# Patient Record
Sex: FEMALE | Race: WHITE | Employment: FULL TIME | URBAN - NONMETROPOLITAN AREA
[De-identification: names, ages, dates, MRNs, and addresses within clinical notes are randomized per-mention and may not be internally consistent; named-entity substitution may affect disease eponyms.]

---

## 2017-05-12 ENCOUNTER — HOSPITAL ENCOUNTER (OUTPATIENT)
Dept: GENERAL RADIOLOGY | Age: 27
Discharge: HOME | End: 2017-05-12
Payer: MEDICARE

## 2017-05-12 ENCOUNTER — HOSPITAL ENCOUNTER (OUTPATIENT)
Dept: ULTRASOUND IMAGING | Age: 27
Discharge: HOME | End: 2017-05-12
Payer: MEDICARE

## 2017-05-12 ENCOUNTER — HOSPITAL ENCOUNTER (OUTPATIENT)
Dept: WOMENS IMAGING | Age: 27
Discharge: HOME | End: 2017-05-12
Payer: MEDICARE

## 2017-05-12 DIAGNOSIS — R52 PAIN: ICD-10-CM

## 2017-05-12 DIAGNOSIS — N63.0 LUMP OR MASS IN BREAST: ICD-10-CM

## 2017-05-12 PROCEDURE — G0204 DX MAMMO INCL CAD BI: HCPCS

## 2017-05-12 PROCEDURE — 76641 ULTRASOUND BREAST COMPLETE: CPT

## 2017-07-14 ENCOUNTER — HOSPITAL ENCOUNTER (EMERGENCY)
Age: 27
Discharge: HOME | End: 2017-07-14
Attending: EMERGENCY MEDICINE
Payer: MEDICARE

## 2017-07-14 VITALS
DIASTOLIC BLOOD PRESSURE: 68 MMHG | RESPIRATION RATE: 16 BRPM | SYSTOLIC BLOOD PRESSURE: 108 MMHG | OXYGEN SATURATION: 95 % | TEMPERATURE: 95.8 F | HEART RATE: 64 BPM

## 2017-07-14 DIAGNOSIS — G43.009 NONINTRACTABLE MIGRAINE, UNSPECIFIED MIGRAINE TYPE: Primary | ICD-10-CM

## 2017-07-14 PROCEDURE — 6360000002 HC RX W HCPCS: Performed by: EMERGENCY MEDICINE

## 2017-07-14 PROCEDURE — 96374 THER/PROPH/DIAG INJ IV PUSH: CPT

## 2017-07-14 PROCEDURE — 99283 EMERGENCY DEPT VISIT LOW MDM: CPT

## 2017-07-14 PROCEDURE — 2580000003 HC RX 258: Performed by: EMERGENCY MEDICINE

## 2017-07-14 PROCEDURE — 96361 HYDRATE IV INFUSION ADD-ON: CPT

## 2017-07-14 PROCEDURE — 96375 TX/PRO/DX INJ NEW DRUG ADDON: CPT

## 2017-07-14 RX ORDER — KETOROLAC TROMETHAMINE 15 MG/ML
15 INJECTION, SOLUTION INTRAMUSCULAR; INTRAVENOUS ONCE
Status: COMPLETED | OUTPATIENT
Start: 2017-07-14 | End: 2017-07-14

## 2017-07-14 RX ORDER — ACETAMINOPHEN 325 MG/1
650 TABLET ORAL EVERY 6 HOURS PRN
COMMUNITY
End: 2018-06-11 | Stop reason: ALTCHOICE

## 2017-07-14 RX ORDER — ONDANSETRON 2 MG/ML
8 INJECTION INTRAMUSCULAR; INTRAVENOUS ONCE
Status: COMPLETED | OUTPATIENT
Start: 2017-07-14 | End: 2017-07-14

## 2017-07-14 RX ORDER — METOCLOPRAMIDE HYDROCHLORIDE 5 MG/ML
10 INJECTION INTRAMUSCULAR; INTRAVENOUS ONCE
Status: COMPLETED | OUTPATIENT
Start: 2017-07-14 | End: 2017-07-14

## 2017-07-14 RX ORDER — DIPHENHYDRAMINE HYDROCHLORIDE 50 MG/ML
25 INJECTION INTRAMUSCULAR; INTRAVENOUS ONCE
Status: COMPLETED | OUTPATIENT
Start: 2017-07-14 | End: 2017-07-14

## 2017-07-14 RX ADMIN — ONDANSETRON 8 MG: 2 INJECTION INTRAMUSCULAR; INTRAVENOUS at 08:47

## 2017-07-14 RX ADMIN — DIPHENHYDRAMINE HYDROCHLORIDE 25 MG: 50 INJECTION, SOLUTION INTRAMUSCULAR; INTRAVENOUS at 08:47

## 2017-07-14 RX ADMIN — METOCLOPRAMIDE 10 MG: 5 INJECTION, SOLUTION INTRAMUSCULAR; INTRAVENOUS at 08:48

## 2017-07-14 RX ADMIN — KETOROLAC TROMETHAMINE 15 MG: 15 INJECTION, SOLUTION INTRAMUSCULAR; INTRAVENOUS at 08:47

## 2017-07-14 RX ADMIN — SODIUM CHLORIDE 999 ML: 9 INJECTION, SOLUTION INTRAVENOUS at 08:44

## 2017-07-14 ASSESSMENT — ENCOUNTER SYMPTOMS
ABDOMINAL DISTENTION: 0
COUGH: 0
VOMITING: 1
ABDOMINAL PAIN: 0
BACK PAIN: 0
TROUBLE SWALLOWING: 0
BLOOD IN STOOL: 0
COLOR CHANGE: 0
WHEEZING: 0
DIARRHEA: 0
SHORTNESS OF BREATH: 0
CONSTIPATION: 0
PHOTOPHOBIA: 0
NAUSEA: 1

## 2017-07-14 ASSESSMENT — PAIN SCALES - GENERAL
PAINLEVEL_OUTOF10: 9
PAINLEVEL_OUTOF10: 9
PAINLEVEL_OUTOF10: 6
PAINLEVEL_OUTOF10: 9

## 2017-07-14 ASSESSMENT — PAIN DESCRIPTION - PAIN TYPE: TYPE: ACUTE PAIN

## 2017-08-16 ENCOUNTER — APPOINTMENT (OUTPATIENT)
Dept: CT IMAGING | Age: 27
End: 2017-08-16
Payer: MEDICARE

## 2017-08-16 ENCOUNTER — HOSPITAL ENCOUNTER (EMERGENCY)
Age: 27
Discharge: HOME | End: 2017-08-16
Payer: MEDICARE

## 2017-08-16 ENCOUNTER — APPOINTMENT (OUTPATIENT)
Dept: GENERAL RADIOLOGY | Age: 27
End: 2017-08-16
Payer: MEDICARE

## 2017-08-16 VITALS
BODY MASS INDEX: 38.32 KG/M2 | WEIGHT: 230 LBS | SYSTOLIC BLOOD PRESSURE: 122 MMHG | HEIGHT: 65 IN | DIASTOLIC BLOOD PRESSURE: 77 MMHG | HEART RATE: 61 BPM | RESPIRATION RATE: 20 BRPM | OXYGEN SATURATION: 96 % | TEMPERATURE: 100.3 F

## 2017-08-16 DIAGNOSIS — J02.0 STREP THROAT: Primary | ICD-10-CM

## 2017-08-16 LAB
-: ABNORMAL
ABSOLUTE EOS #: 0.1 K/UL (ref 0–0.4)
ABSOLUTE LYMPH #: 1.3 K/UL (ref 1–4.8)
ABSOLUTE MONO #: 0.5 K/UL (ref 0–1)
ALBUMIN SERPL-MCNC: 4.6 G/DL (ref 3.5–5.2)
ALBUMIN/GLOBULIN RATIO: 1.5 (ref 1–2.5)
ALP BLD-CCNC: 48 U/L (ref 35–104)
ALT SERPL-CCNC: 92 U/L (ref 5–33)
AMORPHOUS: ABNORMAL
ANION GAP SERPL CALCULATED.3IONS-SCNC: 16 MMOL/L (ref 9–17)
AST SERPL-CCNC: 73 U/L
BACTERIA: ABNORMAL
BASOPHILS # BLD: 0 %
BASOPHILS ABSOLUTE: 0 K/UL (ref 0–0.2)
BILIRUB SERPL-MCNC: 0.39 MG/DL (ref 0.3–1.2)
BILIRUBIN URINE: NEGATIVE
BUN BLDV-MCNC: 13 MG/DL (ref 6–20)
BUN/CREAT BLD: 14 (ref 9–20)
CALCIUM SERPL-MCNC: 9.1 MG/DL (ref 8.6–10.4)
CASTS UA: ABNORMAL /LPF
CHLORIDE BLD-SCNC: 94 MMOL/L (ref 98–107)
CO2: 24 MMOL/L (ref 20–31)
COLOR: YELLOW
COMMENT UA: ABNORMAL
CREAT SERPL-MCNC: 0.91 MG/DL (ref 0.5–0.9)
CRYSTALS, UA: ABNORMAL /HPF
D-DIMER QUANTITATIVE: 1.41 MG/L FEU (ref 0.19–0.5)
DIFFERENTIAL TYPE: NORMAL
DIRECT EXAM: NORMAL
EOSINOPHILS RELATIVE PERCENT: 1 %
EPITHELIAL CELLS UA: ABNORMAL /HPF (ref 0–25)
GFR AFRICAN AMERICAN: >60 ML/MIN
GFR NON-AFRICAN AMERICAN: >60 ML/MIN
GFR SERPL CREATININE-BSD FRML MDRD: ABNORMAL ML/MIN/{1.73_M2}
GFR SERPL CREATININE-BSD FRML MDRD: ABNORMAL ML/MIN/{1.73_M2}
GLUCOSE BLD-MCNC: 93 MG/DL (ref 70–99)
GLUCOSE URINE: NEGATIVE
HCT VFR BLD CALC: 39.8 % (ref 36–46)
HEMOGLOBIN: 13.6 G/DL (ref 12–16)
KETONES, URINE: NEGATIVE
LEUKOCYTE ESTERASE, URINE: ABNORMAL
LYMPHOCYTES # BLD: 19 %
Lab: NORMAL
Lab: NORMAL
MCH RBC QN AUTO: 30.9 PG (ref 26–34)
MCHC RBC AUTO-ENTMCNC: 34.2 G/DL (ref 31–37)
MCV RBC AUTO: 90.2 FL (ref 80–100)
MONOCYTES # BLD: 8 %
MUCUS: ABNORMAL
NITRITE, URINE: NEGATIVE
OTHER OBSERVATIONS UA: ABNORMAL
PDW BLD-RTO: 13.3 % (ref 12.1–15.2)
PH UA: 5.5 (ref 5–9)
PLATELET # BLD: 180 K/UL (ref 140–450)
PLATELET ESTIMATE: NORMAL
PMV BLD AUTO: 7.6 FL (ref 6–12)
POTASSIUM SERPL-SCNC: 3.9 MMOL/L (ref 3.7–5.3)
PROTEIN UA: NEGATIVE
RBC # BLD: 4.41 M/UL (ref 4–5.2)
RBC # BLD: NORMAL 10*6/UL
RBC UA: ABNORMAL /HPF (ref 0–2)
RENAL EPITHELIAL, UA: ABNORMAL /HPF
SEG NEUTROPHILS: 72 %
SEGMENTED NEUTROPHILS ABSOLUTE COUNT: 5 K/UL (ref 1.8–7.7)
SODIUM BLD-SCNC: 134 MMOL/L (ref 135–144)
SPECIFIC GRAVITY UA: 1.02 (ref 1.01–1.02)
SPECIMEN DESCRIPTION: NORMAL
STATUS: NORMAL
STATUS: NORMAL
TOTAL PROTEIN: 7.7 G/DL (ref 6.4–8.3)
TRICHOMONAS: ABNORMAL
TROPONIN INTERP: NORMAL
TROPONIN T: <0.03 NG/ML
TSH SERPL DL<=0.05 MIU/L-ACNC: 0.59 MIU/L (ref 0.3–5)
TURBIDITY: CLEAR
URINE HGB: ABNORMAL
UROBILINOGEN, URINE: NORMAL
WBC # BLD: 6.9 K/UL (ref 3.5–11)
WBC # BLD: NORMAL 10*3/UL
WBC UA: ABNORMAL /HPF (ref 0–5)
YEAST: ABNORMAL

## 2017-08-16 PROCEDURE — 6370000000 HC RX 637 (ALT 250 FOR IP): Performed by: PHYSICIAN ASSISTANT

## 2017-08-16 PROCEDURE — 85379 FIBRIN DEGRADATION QUANT: CPT

## 2017-08-16 PROCEDURE — 99285 EMERGENCY DEPT VISIT HI MDM: CPT

## 2017-08-16 PROCEDURE — 6370000000 HC RX 637 (ALT 250 FOR IP): Performed by: EMERGENCY MEDICINE

## 2017-08-16 PROCEDURE — 70450 CT HEAD/BRAIN W/O DYE: CPT

## 2017-08-16 PROCEDURE — 93005 ELECTROCARDIOGRAM TRACING: CPT

## 2017-08-16 PROCEDURE — 6360000002 HC RX W HCPCS: Performed by: PHYSICIAN ASSISTANT

## 2017-08-16 PROCEDURE — 71010 XR CHEST PORTABLE: CPT

## 2017-08-16 PROCEDURE — 85025 COMPLETE CBC W/AUTO DIFF WBC: CPT

## 2017-08-16 PROCEDURE — 96374 THER/PROPH/DIAG INJ IV PUSH: CPT

## 2017-08-16 PROCEDURE — 36415 COLL VENOUS BLD VENIPUNCTURE: CPT

## 2017-08-16 PROCEDURE — 6360000004 HC RX CONTRAST MEDICATION: Performed by: PHYSICIAN ASSISTANT

## 2017-08-16 PROCEDURE — 81001 URINALYSIS AUTO W/SCOPE: CPT

## 2017-08-16 PROCEDURE — 71275 CT ANGIOGRAPHY CHEST: CPT

## 2017-08-16 PROCEDURE — 87086 URINE CULTURE/COLONY COUNT: CPT

## 2017-08-16 PROCEDURE — 2580000003 HC RX 258: Performed by: PHYSICIAN ASSISTANT

## 2017-08-16 PROCEDURE — 84443 ASSAY THYROID STIM HORMONE: CPT

## 2017-08-16 PROCEDURE — 80053 COMPREHEN METABOLIC PANEL: CPT

## 2017-08-16 PROCEDURE — 84484 ASSAY OF TROPONIN QUANT: CPT

## 2017-08-16 PROCEDURE — 87651 STREP A DNA AMP PROBE: CPT

## 2017-08-16 RX ORDER — METHYLPREDNISOLONE SODIUM SUCCINATE 125 MG/2ML
125 INJECTION, POWDER, LYOPHILIZED, FOR SOLUTION INTRAMUSCULAR; INTRAVENOUS ONCE
Status: COMPLETED | OUTPATIENT
Start: 2017-08-16 | End: 2017-08-16

## 2017-08-16 RX ORDER — AMOXICILLIN 500 MG/1
500 CAPSULE ORAL 2 TIMES DAILY
Qty: 20 CAPSULE | Refills: 0 | Status: SHIPPED | OUTPATIENT
Start: 2017-08-16 | End: 2017-08-26

## 2017-08-16 RX ORDER — AMOXICILLIN 250 MG/1
500 CAPSULE ORAL ONCE
Status: COMPLETED | OUTPATIENT
Start: 2017-08-16 | End: 2017-08-16

## 2017-08-16 RX ORDER — PREDNISONE 10 MG/1
20 TABLET ORAL 2 TIMES DAILY
Qty: 20 TABLET | Refills: 0 | Status: SHIPPED | OUTPATIENT
Start: 2017-08-16 | End: 2017-08-21

## 2017-08-16 RX ORDER — IBUPROFEN 800 MG/1
800 TABLET ORAL EVERY 8 HOURS PRN
Qty: 21 TABLET | Refills: 0 | Status: SHIPPED | OUTPATIENT
Start: 2017-08-16 | End: 2018-06-11 | Stop reason: ALTCHOICE

## 2017-08-16 RX ORDER — ACETAMINOPHEN 325 MG/1
650 TABLET ORAL ONCE
Status: COMPLETED | OUTPATIENT
Start: 2017-08-16 | End: 2017-08-16

## 2017-08-16 RX ORDER — 0.9 % SODIUM CHLORIDE 0.9 %
1000 INTRAVENOUS SOLUTION INTRAVENOUS ONCE
Status: COMPLETED | OUTPATIENT
Start: 2017-08-16 | End: 2017-08-16

## 2017-08-16 RX ORDER — IBUPROFEN 800 MG/1
800 TABLET ORAL ONCE
Status: COMPLETED | OUTPATIENT
Start: 2017-08-16 | End: 2017-08-16

## 2017-08-16 RX ADMIN — METHYLPREDNISOLONE SODIUM SUCCINATE 125 MG: 125 INJECTION, POWDER, FOR SOLUTION INTRAMUSCULAR; INTRAVENOUS at 17:28

## 2017-08-16 RX ADMIN — IOPAMIDOL 100 ML: 612 INJECTION, SOLUTION INTRAVENOUS at 16:39

## 2017-08-16 RX ADMIN — ACETAMINOPHEN 650 MG: 325 TABLET, FILM COATED ORAL at 15:08

## 2017-08-16 RX ADMIN — SODIUM CHLORIDE 1000 ML: 0.9 INJECTION, SOLUTION INTRAVENOUS at 15:56

## 2017-08-16 RX ADMIN — AMOXICILLIN 500 MG: 250 CAPSULE ORAL at 17:28

## 2017-08-16 RX ADMIN — IBUPROFEN 800 MG: 800 TABLET ORAL at 15:47

## 2017-08-16 ASSESSMENT — PAIN DESCRIPTION - LOCATION: LOCATION: THROAT

## 2017-08-16 ASSESSMENT — ENCOUNTER SYMPTOMS
DIARRHEA: 0
SORE THROAT: 1
NAUSEA: 0
EYE REDNESS: 0
WHEEZING: 0
BLOOD IN STOOL: 0
CONSTIPATION: 0
EYE DISCHARGE: 0
SHORTNESS OF BREATH: 1
BACK PAIN: 0
VOMITING: 0
COUGH: 0
CHEST TIGHTNESS: 0
RHINORRHEA: 0
ABDOMINAL PAIN: 0

## 2017-08-16 ASSESSMENT — PAIN SCALES - GENERAL
PAINLEVEL_OUTOF10: 0
PAINLEVEL_OUTOF10: 9
PAINLEVEL_OUTOF10: 0

## 2017-08-16 ASSESSMENT — PAIN DESCRIPTION - PAIN TYPE: TYPE: ACUTE PAIN

## 2017-08-16 ASSESSMENT — PAIN DESCRIPTION - DESCRIPTORS: DESCRIPTORS: BURNING

## 2017-08-17 LAB
CULTURE: NORMAL
CULTURE: NORMAL
EKG ATRIAL RATE: 98 BPM
EKG P AXIS: 32 DEGREES
EKG P-R INTERVAL: 142 MS
EKG Q-T INTERVAL: 346 MS
EKG QRS DURATION: 96 MS
EKG QTC CALCULATION (BAZETT): 441 MS
EKG R AXIS: 50 DEGREES
EKG T AXIS: -15 DEGREES
EKG VENTRICULAR RATE: 98 BPM
Lab: NORMAL
SPECIMEN DESCRIPTION: NORMAL
SPECIMEN DESCRIPTION: NORMAL
STATUS: NORMAL

## 2017-09-26 ENCOUNTER — HOSPITAL ENCOUNTER (EMERGENCY)
Age: 27
Discharge: HOME | End: 2017-09-26
Payer: COMMERCIAL

## 2017-09-26 VITALS
TEMPERATURE: 98.7 F | DIASTOLIC BLOOD PRESSURE: 60 MMHG | HEART RATE: 68 BPM | SYSTOLIC BLOOD PRESSURE: 106 MMHG | OXYGEN SATURATION: 97 % | RESPIRATION RATE: 18 BRPM

## 2017-09-26 DIAGNOSIS — R13.10 ODYNOPHAGIA: Primary | ICD-10-CM

## 2017-09-26 LAB
DIRECT EXAM: NORMAL
Lab: NORMAL
SPECIMEN DESCRIPTION: NORMAL
STATUS: NORMAL

## 2017-09-26 PROCEDURE — 99283 EMERGENCY DEPT VISIT LOW MDM: CPT

## 2017-09-26 PROCEDURE — 6370000000 HC RX 637 (ALT 250 FOR IP): Performed by: PHYSICIAN ASSISTANT

## 2017-09-26 PROCEDURE — 87651 STREP A DNA AMP PROBE: CPT

## 2017-09-26 RX ORDER — OMEPRAZOLE 20 MG/1
20 CAPSULE, DELAYED RELEASE ORAL DAILY
Qty: 14 CAPSULE | Refills: 0 | Status: SHIPPED | OUTPATIENT
Start: 2017-09-26 | End: 2018-06-11 | Stop reason: ALTCHOICE

## 2017-09-26 RX ADMIN — Medication 30 ML: at 15:47

## 2017-09-26 ASSESSMENT — ENCOUNTER SYMPTOMS
DIARRHEA: 0
BACK PAIN: 0
SHORTNESS OF BREATH: 0
WHEEZING: 0
EYE REDNESS: 0
SORE THROAT: 1
COUGH: 0
NAUSEA: 0
EYE DISCHARGE: 0
BLOOD IN STOOL: 0
RHINORRHEA: 0
CONSTIPATION: 0
ABDOMINAL PAIN: 0
VOMITING: 0
CHEST TIGHTNESS: 0

## 2017-09-26 ASSESSMENT — PAIN SCALES - GENERAL: PAINLEVEL_OUTOF10: 5

## 2017-09-26 ASSESSMENT — PAIN DESCRIPTION - LOCATION: LOCATION: THROAT

## 2017-09-26 ASSESSMENT — PAIN DESCRIPTION - DESCRIPTORS: DESCRIPTORS: ACHING

## 2017-09-27 LAB
DIRECT EXAM: NORMAL
DIRECT EXAM: NORMAL
Lab: NORMAL
SPECIMEN DESCRIPTION: NORMAL
SPECIMEN DESCRIPTION: NORMAL
STATUS: NORMAL

## 2017-12-16 ENCOUNTER — APPOINTMENT (OUTPATIENT)
Dept: CT IMAGING | Age: 27
End: 2017-12-16
Payer: COMMERCIAL

## 2017-12-16 ENCOUNTER — APPOINTMENT (OUTPATIENT)
Dept: GENERAL RADIOLOGY | Age: 27
End: 2017-12-16
Payer: COMMERCIAL

## 2017-12-16 ENCOUNTER — HOSPITAL ENCOUNTER (EMERGENCY)
Age: 27
Discharge: HOME | End: 2017-12-16
Attending: EMERGENCY MEDICINE
Payer: COMMERCIAL

## 2017-12-16 VITALS
OXYGEN SATURATION: 97 % | HEART RATE: 81 BPM | WEIGHT: 250 LBS | DIASTOLIC BLOOD PRESSURE: 71 MMHG | BODY MASS INDEX: 41.6 KG/M2 | SYSTOLIC BLOOD PRESSURE: 120 MMHG | TEMPERATURE: 98.1 F | RESPIRATION RATE: 18 BRPM

## 2017-12-16 DIAGNOSIS — J03.90 ACUTE TONSILLITIS, UNSPECIFIED ETIOLOGY: Primary | ICD-10-CM

## 2017-12-16 DIAGNOSIS — J36 PERITONSILLAR ABSCESS: ICD-10-CM

## 2017-12-16 LAB
ABSOLUTE EOS #: 0.3 K/UL (ref 0–0.4)
ABSOLUTE IMMATURE GRANULOCYTE: NORMAL K/UL (ref 0–0.3)
ABSOLUTE LYMPH #: 2.9 K/UL (ref 1–4.8)
ABSOLUTE MONO #: 0.5 K/UL (ref 0–1)
ANION GAP SERPL CALCULATED.3IONS-SCNC: 12 MMOL/L (ref 9–17)
BASOPHILS # BLD: 1 % (ref 0–2)
BASOPHILS ABSOLUTE: 0 K/UL (ref 0–0.2)
BUN BLDV-MCNC: 12 MG/DL (ref 6–20)
BUN/CREAT BLD: 16 (ref 9–20)
CALCIUM SERPL-MCNC: 9.1 MG/DL (ref 8.6–10.4)
CHLORIDE BLD-SCNC: 102 MMOL/L (ref 98–107)
CO2: 24 MMOL/L (ref 20–31)
CREAT SERPL-MCNC: 0.74 MG/DL (ref 0.5–0.9)
DIFFERENTIAL TYPE: NORMAL
DIRECT EXAM: ABNORMAL
EOSINOPHILS RELATIVE PERCENT: 4 % (ref 0–8)
GFR AFRICAN AMERICAN: >60 ML/MIN
GFR NON-AFRICAN AMERICAN: >60 ML/MIN
GFR SERPL CREATININE-BSD FRML MDRD: ABNORMAL ML/MIN/{1.73_M2}
GFR SERPL CREATININE-BSD FRML MDRD: ABNORMAL ML/MIN/{1.73_M2}
GLUCOSE BLD-MCNC: 126 MG/DL (ref 70–99)
HCT VFR BLD CALC: 45 % (ref 36–46)
HEMOGLOBIN: 15.2 G/DL (ref 12–16)
IMMATURE GRANULOCYTES: NORMAL %
LYMPHOCYTES # BLD: 44 % (ref 24–44)
Lab: ABNORMAL
MCH RBC QN AUTO: 30.1 PG (ref 26–34)
MCHC RBC AUTO-ENTMCNC: 33.7 G/DL (ref 31–37)
MCV RBC AUTO: 89.4 FL (ref 80–100)
MONOCYTES # BLD: 7 % (ref 0–12)
MONONUCLEOSIS SCREEN: NEGATIVE
PDW BLD-RTO: 13.3 % (ref 12.1–15.2)
PLATELET # BLD: 240 K/UL (ref 140–450)
PLATELET ESTIMATE: NORMAL
PMV BLD AUTO: 7.5 FL (ref 6–12)
POTASSIUM SERPL-SCNC: 3.7 MMOL/L (ref 3.7–5.3)
RBC # BLD: 5.03 M/UL (ref 4–5.2)
RBC # BLD: NORMAL 10*6/UL
SEG NEUTROPHILS: 44 % (ref 36–66)
SEGMENTED NEUTROPHILS ABSOLUTE COUNT: 2.9 K/UL (ref 1.8–7.7)
SODIUM BLD-SCNC: 138 MMOL/L (ref 135–144)
SPECIMEN DESCRIPTION: ABNORMAL
STATUS: ABNORMAL
WBC # BLD: 6.6 K/UL (ref 3.5–11)
WBC # BLD: NORMAL 10*3/UL

## 2017-12-16 PROCEDURE — 36415 COLL VENOUS BLD VENIPUNCTURE: CPT

## 2017-12-16 PROCEDURE — 86308 HETEROPHILE ANTIBODY SCREEN: CPT

## 2017-12-16 PROCEDURE — 85025 COMPLETE CBC W/AUTO DIFF WBC: CPT

## 2017-12-16 PROCEDURE — 87880 STREP A ASSAY W/OPTIC: CPT

## 2017-12-16 PROCEDURE — 6360000004 HC RX CONTRAST MEDICATION: Performed by: EMERGENCY MEDICINE

## 2017-12-16 PROCEDURE — 6360000002 HC RX W HCPCS: Performed by: EMERGENCY MEDICINE

## 2017-12-16 PROCEDURE — 96372 THER/PROPH/DIAG INJ SC/IM: CPT

## 2017-12-16 PROCEDURE — 71020 XR CHEST STANDARD TWO VW: CPT

## 2017-12-16 PROCEDURE — 80048 BASIC METABOLIC PNL TOTAL CA: CPT

## 2017-12-16 PROCEDURE — 99284 EMERGENCY DEPT VISIT MOD MDM: CPT

## 2017-12-16 PROCEDURE — 70491 CT SOFT TISSUE NECK W/DYE: CPT

## 2017-12-16 RX ORDER — CLINDAMYCIN HYDROCHLORIDE 150 MG/1
450 CAPSULE ORAL 3 TIMES DAILY
Qty: 90 CAPSULE | Refills: 0 | Status: SHIPPED | OUTPATIENT
Start: 2017-12-16 | End: 2017-12-26

## 2017-12-16 RX ADMIN — IOPAMIDOL 100 ML: 612 INJECTION, SOLUTION INTRAVENOUS at 10:53

## 2017-12-16 RX ADMIN — PENICILLIN G BENZATHINE 1.2 MILLION UNITS: 1200000 INJECTION, SUSPENSION INTRAMUSCULAR at 11:32

## 2017-12-16 RX ADMIN — Medication 10 MG: at 10:38

## 2017-12-16 ASSESSMENT — ENCOUNTER SYMPTOMS
BLOOD IN STOOL: 0
CHEST TIGHTNESS: 0
NAUSEA: 0
WHEEZING: 0
FACIAL SWELLING: 0
VOICE CHANGE: 0
PHOTOPHOBIA: 0
ABDOMINAL PAIN: 0
DIARRHEA: 0
VOMITING: 0
BACK PAIN: 0
COUGH: 1
TROUBLE SWALLOWING: 0
SORE THROAT: 1
SHORTNESS OF BREATH: 0

## 2017-12-16 ASSESSMENT — PAIN DESCRIPTION - LOCATION: LOCATION: THROAT

## 2017-12-16 ASSESSMENT — PAIN SCALES - GENERAL: PAINLEVEL_OUTOF10: 8

## 2017-12-16 ASSESSMENT — PAIN DESCRIPTION - PAIN TYPE: TYPE: ACUTE PAIN

## 2017-12-16 NOTE — ED PROVIDER NOTES
[facility] ED  eMERGENCY dEPARTMENT eNCOUnter      Pt Name: Cherylene Ends  MRN: [id number]  [place] 1990  Date of evaluation: 12/16/2017  Provider: Bess Brownlee DO, [address]       Chief Complaint   Patient presents with    Pharyngitis     Was seen in urgent care about 1 month ago and tested positive for strep throat. was on amoxicillen and prednisone       HISTORY OF PRESENT ILLNESS    Cherylene Ends is a 24 y.o. female who presents to the emergency department from home With complaints of a sore throat ×1 month. The patient was seen at the urgent care, states she was treated for a positive streptococcal pharyngitis with amoxicillin and low-dose prednisone and symptoms improved slightly but Continued for the month and never subsided. She continues to have a lot of discomfort and a globus type sensation in the throat. No fevers or chills. She has had a cough without shortness of breath. Cough is productive of occasional bloody-type sputum. Triage notes and Nursing notes were reviewed by myself. Any discrepancies are addressed above.     PAST MEDICAL HISTORY     Past Medical History:   Diagnosis Date    Allergy     seasonal    ASCUS with positive high risk HPV 7/16/2013    Asthma childhood    Bipolar disorder ([facility] [address]     Heart abnormality     HSIL on Pap smear 8/16/2012    Postpartum depression     Type AB blood, Rh positive        SURGICAL HISTORY       Past Surgical History:   Procedure Laterality Date    DILATION AND CURETTAGE  age 17    miscarriage       CURRENT MEDICATIONS       Previous Medications    ACETAMINOPHEN (TYLENOL) 325 MG TABLET    Take 650 mg by mouth every 6 hours as needed for Pain    IBUPROFEN (ADVIL;MOTRIN) 800 MG TABLET    Take 1 tablet by mouth every 8 hours as needed for Pain    OMEPRAZOLE (PRILOSEC) 20 MG DELAYED RELEASE CAPSULE    Take 1 capsule by mouth daily       ALLERGIES     Review of patient's allergies indicates no known allergies. FAMILY HISTORY       Family History   Problem Relation Age of Onset    Diabetes Father      double amputee        SOCIAL HISTORY       Social History     Social History    Marital status:      Spouse name: N/A    Number of children: N/A    Years of education: N/A     Occupational History     [employer]     Social History Main Topics    Smoking status: Former Smoker    Smokeless tobacco: Never Used    Alcohol use 0.0 oz/week      Comment: socially    Drug use: No    Sexual activity: Yes     Partners: Male     Other Topics Concern    None     Social History Narrative    None       REVIEW OF SYSTEMS       Review of Systems   Constitutional: Negative for chills, diaphoresis and fever. HENT: Positive for sore throat. Negative for facial swelling, trouble swallowing and voice change. Eyes: Negative for photophobia and visual disturbance. Respiratory: Positive for cough. Negative for chest tightness, shortness of breath and wheezing. Cardiovascular: Negative for chest pain, palpitations and leg swelling. Gastrointestinal: Negative for abdominal pain, blood in stool, diarrhea, nausea and vomiting. Endocrine: Negative for polydipsia and polyuria. Genitourinary: Negative for dysuria, frequency, hematuria and urgency. Musculoskeletal: Negative for back pain, neck pain and neck stiffness. Skin: Negative for pallor and rash. Neurological: Negative for seizures, speech difficulty, weakness, numbness and headaches. Hematological: Does not bruise/bleed easily. Psychiatric/Behavioral: Negative for confusion. The patient is not nervous/anxious. Except as noted above the remainder of the review of systems was reviewed and is negative.    PHYSICAL EXAM    (up to 7 for level 4, 8 or more for level 5)     ED Triage Vitals [12/16/17 0943]   BP Temp Temp Source Pulse Resp SpO2 Height Weight   120/71 98.1 °F (36.7 °C) Tympanic 81 18 97 % -- 250 lb (113.4 kg) Physical Exam   Constitutional: She is oriented to person, place, and time. Vital signs are normal. She appears well-developed and well-nourished. Non-toxic appearance. She does not appear ill. No distress. HENT:   Head: Normocephalic and atraumatic. Mouth/Throat: Oropharynx is clear and moist.   Bilateral erythematous and enlarged tonsils, right greater than left. No significant exudate is present. Clinically no evidence of abscess. Mild bilateral cervical lymphadenopathy. No trismus, no Jarrod's, no evidence of RPA, tracheitis   Eyes: Conjunctivae and EOM are normal. Pupils are equal, round, and reactive to light. Right conjunctiva is not injected. Left conjunctiva is not injected. No scleral icterus. Neck: Normal range of motion. Neck supple. No tracheal deviation present. No thyromegaly present. Cardiovascular: Normal rate, regular rhythm, normal heart sounds and intact distal pulses. Exam reveals no gallop and no friction rub. No murmur heard. Pulmonary/Chest: Effort normal and breath sounds normal. No stridor. No respiratory distress. She has no wheezes. She has no rales. Abdominal: Soft. Bowel sounds are normal. She exhibits no distension and no mass. There is no tenderness. There is no rebound and no guarding. Negative Gandhi's sign  Nontender McBurney's Point  Negative Rovsig's sign  No bruising or echymosis of abdomen   Musculoskeletal: She exhibits no edema or tenderness. Negative Raghavendra's Sign bilaterally   Lymphadenopathy:     She has cervical adenopathy. Neurological: She is alert and oriented to person, place, and time. No cranial nerve deficit. She exhibits normal muscle tone. Coordination normal.   No nystagmus   Skin: Skin is warm and dry. No rash noted. She is not diaphoretic. No erythema. No pallor. Psychiatric: She has a normal mood and affect. Her behavior is normal. Thought content normal.   Nursing note and vitals reviewed.       DIAGNOSTIC RESULTS     EKG: she is very much in agreement with this. We'll start her on clindamycin after giving her a dose of Bicillin in the ED. Strict return precautions and follow up instructions were discussed with the patient with which the patient agrees    CONSULTS: (None if blank)  None    Procedures: (None if blank)       CLINICAL IMPRESSION      1. Acute tonsillitis, unspecified etiology    2.  Peritonsillar abscess          DISPOSITION/PLAN   DISPOSITION Discharge - Pending Orders Complete    PATIENT REFERRED TO:  Bertha Jimenez MD  [address] [address]    In 2 days  or return to the ER for a recheck if any worsening      DISCHARGE MEDICATIONS:  New Prescriptions    CLINDAMYCIN (CLEOCIN) 150 MG CAPSULE    Take 3 capsules by mouth 3 times daily for 10 days              (Please note that portions of this note were completed with a voice recognition program.  Efforts were made to edit the dictations but occasionally words are mis-transcribed.)      Joan Arreola DO, FACEP (electronically signed)  Attending Emergency Physician         Donita Heath DO  12/16/17 3030

## 2018-05-30 ENCOUNTER — HOSPITAL ENCOUNTER (EMERGENCY)
Age: 28
Discharge: HOME | End: 2018-05-30
Payer: MEDICARE

## 2018-05-30 ENCOUNTER — APPOINTMENT (OUTPATIENT)
Dept: ULTRASOUND IMAGING | Age: 28
End: 2018-05-30
Payer: MEDICARE

## 2018-05-30 VITALS
HEIGHT: 66 IN | OXYGEN SATURATION: 98 % | WEIGHT: 250 LBS | TEMPERATURE: 97.9 F | RESPIRATION RATE: 18 BRPM | HEART RATE: 69 BPM | DIASTOLIC BLOOD PRESSURE: 89 MMHG | SYSTOLIC BLOOD PRESSURE: 137 MMHG | BODY MASS INDEX: 40.18 KG/M2

## 2018-05-30 DIAGNOSIS — E28.2 PCOS (POLYCYSTIC OVARIAN SYNDROME): Primary | ICD-10-CM

## 2018-05-30 LAB
-: ABNORMAL
AMORPHOUS: ABNORMAL
BACTERIA: ABNORMAL
BILIRUBIN URINE: NEGATIVE
CASTS UA: ABNORMAL /LPF
COLOR: YELLOW
COMMENT UA: ABNORMAL
CRYSTALS, UA: ABNORMAL /HPF
DIRECT EXAM: NORMAL
EPITHELIAL CELLS UA: ABNORMAL /HPF (ref 0–25)
GLUCOSE URINE: NEGATIVE
HCG(URINE) PREGNANCY TEST: NEGATIVE
KETONES, URINE: NEGATIVE
LEUKOCYTE ESTERASE, URINE: ABNORMAL
Lab: NORMAL
MUCUS: ABNORMAL
NITRITE, URINE: NEGATIVE
OTHER OBSERVATIONS UA: ABNORMAL
PH UA: 5.5 (ref 5–9)
PROTEIN UA: NEGATIVE
RBC UA: ABNORMAL /HPF (ref 0–2)
RENAL EPITHELIAL, UA: ABNORMAL /HPF
SPECIFIC GRAVITY UA: 1.02 (ref 1.01–1.02)
SPECIMEN DESCRIPTION: NORMAL
STATUS: NORMAL
TRICHOMONAS: ABNORMAL
TURBIDITY: CLEAR
URINE HGB: NEGATIVE
UROBILINOGEN, URINE: NORMAL
WBC UA: ABNORMAL /HPF (ref 0–5)
YEAST: ABNORMAL

## 2018-05-30 PROCEDURE — 99284 EMERGENCY DEPT VISIT MOD MDM: CPT

## 2018-05-30 PROCEDURE — 84703 CHORIONIC GONADOTROPIN ASSAY: CPT

## 2018-05-30 PROCEDURE — 87210 SMEAR WET MOUNT SALINE/INK: CPT

## 2018-05-30 PROCEDURE — 76830 TRANSVAGINAL US NON-OB: CPT

## 2018-05-30 PROCEDURE — 87086 URINE CULTURE/COLONY COUNT: CPT

## 2018-05-30 PROCEDURE — 87591 N.GONORRHOEAE DNA AMP PROB: CPT

## 2018-05-30 PROCEDURE — 81001 URINALYSIS AUTO W/SCOPE: CPT

## 2018-05-30 RX ORDER — KETOROLAC TROMETHAMINE 30 MG/ML
30 INJECTION, SOLUTION INTRAMUSCULAR; INTRAVENOUS ONCE
Status: DISCONTINUED | OUTPATIENT
Start: 2018-05-30 | End: 2018-05-30 | Stop reason: HOSPADM

## 2018-05-30 ASSESSMENT — ENCOUNTER SYMPTOMS
NAUSEA: 0
RHINORRHEA: 0
CHEST TIGHTNESS: 0
ABDOMINAL PAIN: 0
BLOOD IN STOOL: 0
SHORTNESS OF BREATH: 0
WHEEZING: 0
EYE DISCHARGE: 0
BACK PAIN: 0
SORE THROAT: 0
DIARRHEA: 0
CONSTIPATION: 0
VOMITING: 0
COUGH: 0
EYE REDNESS: 0

## 2018-05-30 ASSESSMENT — PAIN DESCRIPTION - LOCATION: LOCATION: ABDOMEN

## 2018-05-30 ASSESSMENT — PAIN SCALES - GENERAL: PAINLEVEL_OUTOF10: 7

## 2018-05-30 ASSESSMENT — PAIN DESCRIPTION - PAIN TYPE: TYPE: ACUTE PAIN

## 2018-05-30 ASSESSMENT — PAIN DESCRIPTION - ORIENTATION: ORIENTATION: LEFT

## 2018-05-31 LAB
CULTURE: NORMAL
CULTURE: NORMAL
Lab: NORMAL
N. GONORRHOEAE DNA: NEGATIVE
SPECIMEN DESCRIPTION: NORMAL
SPECIMEN DESCRIPTION: NORMAL
STATUS: NORMAL

## 2018-06-11 ENCOUNTER — HOSPITAL ENCOUNTER (OUTPATIENT)
Age: 28
Setting detail: SPECIMEN
Discharge: HOME | End: 2018-06-11
Payer: MEDICARE

## 2018-06-11 ENCOUNTER — OFFICE VISIT (OUTPATIENT)
Dept: OBGYN | Age: 28
End: 2018-06-11
Payer: MEDICARE

## 2018-06-11 VITALS
BODY MASS INDEX: 40.34 KG/M2 | DIASTOLIC BLOOD PRESSURE: 64 MMHG | SYSTOLIC BLOOD PRESSURE: 108 MMHG | HEIGHT: 66 IN | WEIGHT: 251 LBS

## 2018-06-11 DIAGNOSIS — E28.2 PCOS (POLYCYSTIC OVARIAN SYNDROME): ICD-10-CM

## 2018-06-11 DIAGNOSIS — E66.01 OBESITY, CLASS III, BMI 40-49.9 (MORBID OBESITY) (HCC): ICD-10-CM

## 2018-06-11 DIAGNOSIS — R10.2 PELVIC PAIN IN FEMALE: ICD-10-CM

## 2018-06-11 DIAGNOSIS — Z12.4 ROUTINE PAPANICOLAOU SMEAR: Primary | ICD-10-CM

## 2018-06-11 DIAGNOSIS — N94.5 SECONDARY DYSMENORRHEA: ICD-10-CM

## 2018-06-11 PROCEDURE — 1036F TOBACCO NON-USER: CPT | Performed by: OBSTETRICS & GYNECOLOGY

## 2018-06-11 PROCEDURE — 87491 CHLMYD TRACH DNA AMP PROBE: CPT

## 2018-06-11 PROCEDURE — 87591 N.GONORRHOEAE DNA AMP PROB: CPT

## 2018-06-11 PROCEDURE — G8427 DOCREV CUR MEDS BY ELIG CLIN: HCPCS | Performed by: OBSTETRICS & GYNECOLOGY

## 2018-06-11 PROCEDURE — 99202 OFFICE O/P NEW SF 15 MIN: CPT | Performed by: OBSTETRICS & GYNECOLOGY

## 2018-06-11 PROCEDURE — G0145 SCR C/V CYTO,THINLAYER,RESCR: HCPCS

## 2018-06-11 PROCEDURE — G8417 CALC BMI ABV UP PARAM F/U: HCPCS | Performed by: OBSTETRICS & GYNECOLOGY

## 2018-06-11 RX ORDER — NAPROXEN SODIUM 550 MG/1
550 TABLET ORAL 2 TIMES DAILY WITH MEALS
Qty: 60 TABLET | Refills: 3 | Status: SHIPPED | OUTPATIENT
Start: 2018-06-11 | End: 2018-10-09 | Stop reason: ALTCHOICE

## 2018-06-11 RX ORDER — PHENTERMINE HYDROCHLORIDE 37.5 MG/1
37.5 TABLET ORAL
Qty: 30 TABLET | Refills: 0 | Status: SHIPPED | OUTPATIENT
Start: 2018-06-11 | End: 2018-07-09 | Stop reason: SDUPTHER

## 2018-06-11 RX ORDER — ETHYNODIOL DIACETATE AND ETHINYL ESTRADIOL 1 MG-35MCG
1 KIT ORAL DAILY
Qty: 1 PACKET | Refills: 12 | Status: SHIPPED | OUTPATIENT
Start: 2018-06-11 | End: 2018-10-09

## 2018-06-11 ASSESSMENT — PATIENT HEALTH QUESTIONNAIRE - PHQ9
2. FEELING DOWN, DEPRESSED OR HOPELESS: 0
SUM OF ALL RESPONSES TO PHQ9 QUESTIONS 1 & 2: 0
SUM OF ALL RESPONSES TO PHQ QUESTIONS 1-9: 0
1. LITTLE INTEREST OR PLEASURE IN DOING THINGS: 0

## 2018-06-12 LAB
CHLAMYDIA BY THIN PREP: NEGATIVE
N. GONORRHOEAE DNA, THIN PREP: NEGATIVE

## 2018-06-29 LAB — CYTOLOGY REPORT: NORMAL

## 2018-07-09 ENCOUNTER — OFFICE VISIT (OUTPATIENT)
Dept: OBGYN | Age: 28
End: 2018-07-09
Payer: COMMERCIAL

## 2018-07-09 VITALS
HEIGHT: 66 IN | WEIGHT: 247.2 LBS | SYSTOLIC BLOOD PRESSURE: 116 MMHG | BODY MASS INDEX: 39.73 KG/M2 | DIASTOLIC BLOOD PRESSURE: 74 MMHG

## 2018-07-09 DIAGNOSIS — E66.01 OBESITY, CLASS III, BMI 40-49.9 (MORBID OBESITY) (HCC): ICD-10-CM

## 2018-07-09 PROCEDURE — G8427 DOCREV CUR MEDS BY ELIG CLIN: HCPCS | Performed by: OBSTETRICS & GYNECOLOGY

## 2018-07-09 PROCEDURE — G8417 CALC BMI ABV UP PARAM F/U: HCPCS | Performed by: OBSTETRICS & GYNECOLOGY

## 2018-07-09 PROCEDURE — 99212 OFFICE O/P EST SF 10 MIN: CPT | Performed by: OBSTETRICS & GYNECOLOGY

## 2018-07-09 PROCEDURE — 1036F TOBACCO NON-USER: CPT | Performed by: OBSTETRICS & GYNECOLOGY

## 2018-07-09 RX ORDER — PHENTERMINE HYDROCHLORIDE 37.5 MG/1
37.5 TABLET ORAL
Qty: 30 TABLET | Refills: 0 | Status: SHIPPED | OUTPATIENT
Start: 2018-07-09 | End: 2018-08-06 | Stop reason: SDUPTHER

## 2018-07-09 NOTE — PROGRESS NOTES
PROBLEM VISIT     Date of service: 2018    Ariel Grove  Is a 34 y.o.  female    PT's PCP is: NOREEN Moore - KENN     : 1990                                             Subjective:       Patient's last menstrual period was 2018 (exact date). OB History    Para Term  AB Living   4 2 1 1 1 2   SAB TAB Ectopic Molar Multiple Live Births   1         2      # Outcome Date GA Lbr Derek/2nd Weight Sex Delivery Anes PTL Lv   4  14 36w3d 09:45 8 lb (3.629 kg) M Vag-Spont EPI N JUDY   3 Term  37w0d  7 lb 5 oz (3.317 kg) M Vag-Spont   JUDY   2 SAB            1                     History   Smoking Status    Former Smoker   Smokeless Tobacco    Never Used        History   Alcohol Use    0.0 oz/week     Comment: socially       History   Sexual Activity    Sexual activity: Yes    Partners: Male       Allergies: Patient has no known allergies. Chief Complaint   Patient presents with    Weight Loss     patient presents today for weight check and Adipex refill       Last Yearly:  2018    Last pap: 2018    Last HPV: unknown      NURSE: JOSE  PE:  Vital Signs  Blood pressure 116/74, height 5' 6\" (1.676 m), weight 247 lb 3.2 oz (112.1 kg), last menstrual period 2018, not currently breastfeeding. Labs:    No results found for this visit on 18. NURSE: joi    HPI: The patient is here today for an Adipex refill visit. The patient will also need to have a colposcopy performed for HGSIL. The patient is also requesting nextplanon. No  PT denies fever, chills, nausea and vomiting       Objective: The patient's blood pressure is normal and she has had weight loss. Assessment and Plan: We will set her up for colposcopy and Adipex refill at her next visit in 4 weeks will also have her be seen by one of the other providers to have the implant placed. Diagnosis Orders   1.  Obesity, Class III, BMI

## 2018-08-06 ENCOUNTER — PROCEDURE VISIT (OUTPATIENT)
Dept: OBGYN | Age: 28
End: 2018-08-06
Payer: COMMERCIAL

## 2018-08-06 ENCOUNTER — HOSPITAL ENCOUNTER (OUTPATIENT)
Age: 28
Setting detail: SPECIMEN
Discharge: HOME | End: 2018-08-06
Payer: MEDICARE

## 2018-08-06 VITALS
SYSTOLIC BLOOD PRESSURE: 126 MMHG | BODY MASS INDEX: 40.18 KG/M2 | WEIGHT: 250 LBS | HEIGHT: 66 IN | DIASTOLIC BLOOD PRESSURE: 66 MMHG

## 2018-08-06 DIAGNOSIS — R87.613 HGSIL (HIGH GRADE SQUAMOUS INTRAEPITHELIAL LESION) ON PAP SMEAR OF CERVIX: Primary | ICD-10-CM

## 2018-08-06 DIAGNOSIS — N92.6 MENSTRUAL PERIODS IRREGULAR: ICD-10-CM

## 2018-08-06 DIAGNOSIS — E66.01 OBESITY, CLASS III, BMI 40-49.9 (MORBID OBESITY) (HCC): ICD-10-CM

## 2018-08-06 LAB
CONTROL: NORMAL
PREGNANCY TEST URINE, POC: NORMAL

## 2018-08-06 PROCEDURE — 81025 URINE PREGNANCY TEST: CPT | Performed by: OBSTETRICS & GYNECOLOGY

## 2018-08-06 PROCEDURE — 57454 BX/CURETT OF CERVIX W/SCOPE: CPT | Performed by: OBSTETRICS & GYNECOLOGY

## 2018-08-06 PROCEDURE — 88305 TISSUE EXAM BY PATHOLOGIST: CPT

## 2018-08-06 RX ORDER — PHENTERMINE HYDROCHLORIDE 37.5 MG/1
37.5 TABLET ORAL
Qty: 30 TABLET | Refills: 0 | Status: SHIPPED | OUTPATIENT
Start: 2018-08-06 | End: 2018-08-13 | Stop reason: SDUPTHER

## 2018-08-06 NOTE — PROGRESS NOTES
PROBLEM VISIT     Date of service: 2018    Karen Wang  Is a 34 y.o.  female    PT's PCP is: Tomi Long, APRN - CNP     : 1990                                             Subjective:       Patient's last menstrual period was 2018 (exact date). OB History    Para Term  AB Living   4 2 1 1 1 2   SAB TAB Ectopic Molar Multiple Live Births   1         2      # Outcome Date GA Lbr Derek/2nd Weight Sex Delivery Anes PTL Lv   4  14 36w3d 09:45 8 lb (3.629 kg) M Vag-Spont EPI N JUDY   3 Term  37w0d  7 lb 5 oz (3.317 kg) M Vag-Spont   JUDY   2 SAB            1                     History   Smoking Status    Former Smoker   Smokeless Tobacco    Never Used        History   Alcohol Use    0.0 oz/week     Comment: socially       History   Sexual Activity    Sexual activity: Yes    Partners: Male       Allergies: Patient has no known allergies. Chief Complaint   Patient presents with    Abnormal Pap Smear     patient presents today for colposcopy for HGSIL; also Adipex refill       Last Yearly:  2018    Last pap: 2018    Last HPV: unknown      NURSE: JOSE    PE:  Vital Signs  Blood pressure 126/66, height 5' 6\" (1.676 m), weight 250 lb (113.4 kg), last menstrual period 2018, not currently breastfeeding. Labs:    Results for orders placed or performed in visit on 18   POCT urine pregnancy   Result Value Ref Range    Preg Test, Ur neg     Control A            HPI: The patient is here today for colposcopy for HGSIL.   She has had this procedure done in the past.  WILLIS-1 was noted in 2015    No PT denies fever, chills, nausea and vomiting       Objective   Pelvic Exam: GENITAL/URINARY:  External Genitalia:  General appearance; normal, Hair distribution; normal, Lesions absent, colposcopy negative  Vagina:  General appearance normal, Estrogen effect normal, Discharge absent, Lesions absent, Pelvic support normal, colposcopy

## 2018-08-08 LAB — SURGICAL PATHOLOGY REPORT: NORMAL

## 2018-08-13 ENCOUNTER — OFFICE VISIT (OUTPATIENT)
Dept: OBGYN | Age: 28
End: 2018-08-13
Payer: COMMERCIAL

## 2018-08-13 ENCOUNTER — HOSPITAL ENCOUNTER (OUTPATIENT)
Age: 28
Setting detail: SPECIMEN
Discharge: HOME | End: 2018-08-13
Payer: MEDICARE

## 2018-08-13 VITALS
DIASTOLIC BLOOD PRESSURE: 78 MMHG | SYSTOLIC BLOOD PRESSURE: 122 MMHG | HEIGHT: 66 IN | WEIGHT: 251.6 LBS | BODY MASS INDEX: 40.43 KG/M2

## 2018-08-13 DIAGNOSIS — E28.2 PCOS (POLYCYSTIC OVARIAN SYNDROME): ICD-10-CM

## 2018-08-13 DIAGNOSIS — E66.01 OBESITY, CLASS III, BMI 40-49.9 (MORBID OBESITY) (HCC): ICD-10-CM

## 2018-08-13 DIAGNOSIS — D06.9 CIN III WITH SEVERE DYSPLASIA: Primary | ICD-10-CM

## 2018-08-13 LAB
FOLLICLE STIMULATING HORMONE: 4.5 U/L (ref 1.7–21.5)
LH: 5 U/L (ref 1–95.6)
PROLACTIN: 16.14 UG/L (ref 4.79–23.3)
TSH SERPL DL<=0.05 MIU/L-ACNC: 3.02 MIU/L (ref 0.3–5)

## 2018-08-13 PROCEDURE — 83001 ASSAY OF GONADOTROPIN (FSH): CPT

## 2018-08-13 PROCEDURE — 1036F TOBACCO NON-USER: CPT | Performed by: OBSTETRICS & GYNECOLOGY

## 2018-08-13 PROCEDURE — 84146 ASSAY OF PROLACTIN: CPT

## 2018-08-13 PROCEDURE — G8417 CALC BMI ABV UP PARAM F/U: HCPCS | Performed by: OBSTETRICS & GYNECOLOGY

## 2018-08-13 PROCEDURE — 83002 ASSAY OF GONADOTROPIN (LH): CPT

## 2018-08-13 PROCEDURE — 84443 ASSAY THYROID STIM HORMONE: CPT

## 2018-08-13 PROCEDURE — 99213 OFFICE O/P EST LOW 20 MIN: CPT | Performed by: OBSTETRICS & GYNECOLOGY

## 2018-08-13 PROCEDURE — G8427 DOCREV CUR MEDS BY ELIG CLIN: HCPCS | Performed by: OBSTETRICS & GYNECOLOGY

## 2018-08-13 RX ORDER — PREDNISONE 20 MG/1
TABLET ORAL
Refills: 0 | COMMUNITY
Start: 2018-08-09 | End: 2018-10-09 | Stop reason: ALTCHOICE

## 2018-08-13 RX ORDER — AMOXICILLIN 875 MG/1
TABLET, COATED ORAL
Refills: 0 | COMMUNITY
Start: 2018-08-09 | End: 2018-10-09 | Stop reason: ALTCHOICE

## 2018-08-13 RX ORDER — PHENTERMINE HYDROCHLORIDE 37.5 MG/1
37.5 TABLET ORAL
Qty: 30 TABLET | Refills: 0 | Status: SHIPPED | OUTPATIENT
Start: 2018-08-13 | End: 2018-09-12

## 2018-08-13 NOTE — PROGRESS NOTES
PROBLEM VISIT     Date of service: 2018    Art Toledo  Is a 34 y.o.  female    PT's PCP is: NOREEN Sesay - KENN     : 1990                                             Subjective:       Patient's last menstrual period was 2018. OB History    Para Term  AB Living   4 2 1 1 1 2   SAB TAB Ectopic Molar Multiple Live Births   1         2      # Outcome Date GA Lbr Derek/2nd Weight Sex Delivery Anes PTL Lv   4  14 36w3d 09:45 8 lb (3.629 kg) M Vag-Spont EPI N JUDY   3 Term  37w0d  7 lb 5 oz (3.317 kg) M Vag-Spont   JUDY   2 SAB            1                     History   Smoking Status    Former Smoker   Smokeless Tobacco    Never Used        History   Alcohol Use    0.0 oz/week     Comment: socially       History   Sexual Activity    Sexual activity: Yes    Partners: Male       Allergies: Patient has no known allergies. Chief Complaint   Patient presents with    Results     Patient presents today for lab results; Surgical pathology results from 2018 after a colposcopy; Last Yearly:  2018    Last pap: 2018    Last HPV: n/a    PE:  Vital Signs  Blood pressure 122/78, height 5' 6\" (1.676 m), weight 251 lb 9.6 oz (114.1 kg), last menstrual period 2018, not currently breastfeeding. Labs:    No results found for this visit on 18. NURSE: Fred    HPI: The patient is here today for 2 distinct issues. The 1st is a refill of Adipex the patient is doing well with no significant problems or side effects. These 2nd more important issue is that her recent colposcopy did confirm the presence of WILLIS 3. No  PT denies fever, chills, nausea and vomiting       Objective: I did discuss the findings of WILLIS 3 with the patient. In review the pathology process with her.     Assessment/plan: I did review the surgical consent and reviewed the risk of blood loss, infection, cervical scarring, and the risk of

## 2018-09-05 ENCOUNTER — TELEPHONE (OUTPATIENT)
Dept: OBGYN | Age: 28
End: 2018-09-05

## 2018-10-03 ENCOUNTER — TELEPHONE (OUTPATIENT)
Dept: OBGYN | Age: 28
End: 2018-10-03

## 2018-10-03 DIAGNOSIS — Z01.818 PRE-OP TESTING: Primary | ICD-10-CM

## 2018-10-15 ENCOUNTER — HOSPITAL ENCOUNTER (OUTPATIENT)
Age: 28
Discharge: HOME | End: 2018-10-15
Payer: MEDICARE

## 2018-10-15 DIAGNOSIS — Z01.818 PRE-OP TESTING: ICD-10-CM

## 2018-10-15 LAB — HCG QUALITATIVE: NEGATIVE

## 2018-10-15 PROCEDURE — 84703 CHORIONIC GONADOTROPIN ASSAY: CPT

## 2018-10-15 PROCEDURE — 36415 COLL VENOUS BLD VENIPUNCTURE: CPT

## 2018-10-16 ENCOUNTER — ANESTHESIA EVENT (OUTPATIENT)
Dept: OPERATING ROOM | Age: 28
End: 2018-10-16
Payer: MEDICARE

## 2018-10-17 ENCOUNTER — HOSPITAL ENCOUNTER (OUTPATIENT)
Age: 28
Setting detail: OUTPATIENT SURGERY
Discharge: HOME | End: 2018-10-17
Attending: OBSTETRICS & GYNECOLOGY | Admitting: OBSTETRICS & GYNECOLOGY
Payer: MEDICARE

## 2018-10-17 ENCOUNTER — ANESTHESIA (OUTPATIENT)
Dept: OPERATING ROOM | Age: 28
End: 2018-10-17
Payer: MEDICARE

## 2018-10-17 VITALS
DIASTOLIC BLOOD PRESSURE: 59 MMHG | OXYGEN SATURATION: 97 % | TEMPERATURE: 96.5 F | SYSTOLIC BLOOD PRESSURE: 109 MMHG | RESPIRATION RATE: 8 BRPM

## 2018-10-17 VITALS
HEART RATE: 68 BPM | HEIGHT: 66 IN | SYSTOLIC BLOOD PRESSURE: 101 MMHG | RESPIRATION RATE: 16 BRPM | WEIGHT: 240 LBS | BODY MASS INDEX: 38.57 KG/M2 | DIASTOLIC BLOOD PRESSURE: 62 MMHG | OXYGEN SATURATION: 99 % | TEMPERATURE: 98 F

## 2018-10-17 DIAGNOSIS — R87.613 HGSIL (HIGH GRADE SQUAMOUS INTRAEPITHELIAL LESION) ON PAP SMEAR OF CERVIX: Primary | ICD-10-CM

## 2018-10-17 PROCEDURE — 6360000002 HC RX W HCPCS: Performed by: OBSTETRICS & GYNECOLOGY

## 2018-10-17 PROCEDURE — 3600000012 HC SURGERY LEVEL 2 ADDTL 15MIN: Performed by: OBSTETRICS & GYNECOLOGY

## 2018-10-17 PROCEDURE — 7100000001 HC PACU RECOVERY - ADDTL 15 MIN: Performed by: OBSTETRICS & GYNECOLOGY

## 2018-10-17 PROCEDURE — 3700000001 HC ADD 15 MINUTES (ANESTHESIA): Performed by: OBSTETRICS & GYNECOLOGY

## 2018-10-17 PROCEDURE — 88305 TISSUE EXAM BY PATHOLOGIST: CPT

## 2018-10-17 PROCEDURE — 2500000003 HC RX 250 WO HCPCS: Performed by: NURSE ANESTHETIST, CERTIFIED REGISTERED

## 2018-10-17 PROCEDURE — 7100000011 HC PHASE II RECOVERY - ADDTL 15 MIN: Performed by: OBSTETRICS & GYNECOLOGY

## 2018-10-17 PROCEDURE — 57522 CONIZATION OF CERVIX: CPT | Performed by: OBSTETRICS & GYNECOLOGY

## 2018-10-17 PROCEDURE — 6370000000 HC RX 637 (ALT 250 FOR IP): Performed by: OBSTETRICS & GYNECOLOGY

## 2018-10-17 PROCEDURE — 7100000000 HC PACU RECOVERY - FIRST 15 MIN: Performed by: OBSTETRICS & GYNECOLOGY

## 2018-10-17 PROCEDURE — 7100000010 HC PHASE II RECOVERY - FIRST 15 MIN: Performed by: OBSTETRICS & GYNECOLOGY

## 2018-10-17 PROCEDURE — 2580000003 HC RX 258: Performed by: OBSTETRICS & GYNECOLOGY

## 2018-10-17 PROCEDURE — 2709999900 HC NON-CHARGEABLE SUPPLY: Performed by: OBSTETRICS & GYNECOLOGY

## 2018-10-17 PROCEDURE — 3600000002 HC SURGERY LEVEL 2 BASE: Performed by: OBSTETRICS & GYNECOLOGY

## 2018-10-17 PROCEDURE — 6360000002 HC RX W HCPCS: Performed by: NURSE ANESTHETIST, CERTIFIED REGISTERED

## 2018-10-17 PROCEDURE — 3700000000 HC ANESTHESIA ATTENDED CARE: Performed by: OBSTETRICS & GYNECOLOGY

## 2018-10-17 RX ORDER — HYDROCODONE BITARTRATE AND ACETAMINOPHEN 5; 325 MG/1; MG/1
1 TABLET ORAL EVERY 4 HOURS PRN
Status: DISCONTINUED | OUTPATIENT
Start: 2018-10-17 | End: 2018-10-17 | Stop reason: HOSPADM

## 2018-10-17 RX ORDER — FENTANYL CITRATE 50 UG/ML
50 INJECTION, SOLUTION INTRAMUSCULAR; INTRAVENOUS EVERY 5 MIN PRN
Status: DISCONTINUED | OUTPATIENT
Start: 2018-10-17 | End: 2018-10-17 | Stop reason: HOSPADM

## 2018-10-17 RX ORDER — METOCLOPRAMIDE HYDROCHLORIDE 5 MG/ML
10 INJECTION INTRAMUSCULAR; INTRAVENOUS
Status: DISCONTINUED | OUTPATIENT
Start: 2018-10-17 | End: 2018-10-17 | Stop reason: HOSPADM

## 2018-10-17 RX ORDER — FERRIC SUBSULFATE 20-22G/100
SOLUTION, NON-ORAL MISCELLANEOUS PRN
Status: DISCONTINUED | OUTPATIENT
Start: 2018-10-17 | End: 2018-10-17 | Stop reason: HOSPADM

## 2018-10-17 RX ORDER — DEXAMETHASONE SODIUM PHOSPHATE 4 MG/ML
INJECTION, SOLUTION INTRA-ARTICULAR; INTRALESIONAL; INTRAMUSCULAR; INTRAVENOUS; SOFT TISSUE PRN
Status: DISCONTINUED | OUTPATIENT
Start: 2018-10-17 | End: 2018-10-17 | Stop reason: SDUPTHER

## 2018-10-17 RX ORDER — MIDAZOLAM HYDROCHLORIDE 1 MG/ML
INJECTION INTRAMUSCULAR; INTRAVENOUS PRN
Status: DISCONTINUED | OUTPATIENT
Start: 2018-10-17 | End: 2018-10-17 | Stop reason: SDUPTHER

## 2018-10-17 RX ORDER — FENTANYL CITRATE 50 UG/ML
INJECTION, SOLUTION INTRAMUSCULAR; INTRAVENOUS PRN
Status: DISCONTINUED | OUTPATIENT
Start: 2018-10-17 | End: 2018-10-17 | Stop reason: SDUPTHER

## 2018-10-17 RX ORDER — SODIUM CHLORIDE, SODIUM LACTATE, POTASSIUM CHLORIDE, CALCIUM CHLORIDE 600; 310; 30; 20 MG/100ML; MG/100ML; MG/100ML; MG/100ML
INJECTION, SOLUTION INTRAVENOUS CONTINUOUS
Status: DISCONTINUED | OUTPATIENT
Start: 2018-10-17 | End: 2018-10-17 | Stop reason: HOSPADM

## 2018-10-17 RX ORDER — ACETAMINOPHEN 325 MG/1
650 TABLET ORAL ONCE
Status: COMPLETED | OUTPATIENT
Start: 2018-10-17 | End: 2018-10-17

## 2018-10-17 RX ORDER — HYDROCODONE BITARTRATE AND ACETAMINOPHEN 5; 325 MG/1; MG/1
2 TABLET ORAL EVERY 4 HOURS PRN
Status: DISCONTINUED | OUTPATIENT
Start: 2018-10-17 | End: 2018-10-17 | Stop reason: HOSPADM

## 2018-10-17 RX ORDER — FENTANYL CITRATE 50 UG/ML
25 INJECTION, SOLUTION INTRAMUSCULAR; INTRAVENOUS EVERY 5 MIN PRN
Status: DISCONTINUED | OUTPATIENT
Start: 2018-10-17 | End: 2018-10-17 | Stop reason: HOSPADM

## 2018-10-17 RX ORDER — DOCUSATE SODIUM 100 MG/1
100 CAPSULE, LIQUID FILLED ORAL 2 TIMES DAILY
Status: DISCONTINUED | OUTPATIENT
Start: 2018-10-17 | End: 2018-10-17 | Stop reason: HOSPADM

## 2018-10-17 RX ORDER — HYDROCODONE BITARTRATE AND ACETAMINOPHEN 5; 325 MG/1; MG/1
1 TABLET ORAL PRN
Status: DISCONTINUED | OUTPATIENT
Start: 2018-10-17 | End: 2018-10-17 | Stop reason: HOSPADM

## 2018-10-17 RX ORDER — DIMENHYDRINATE 50 MG/1
50 TABLET ORAL ONCE
Status: COMPLETED | OUTPATIENT
Start: 2018-10-17 | End: 2018-10-17

## 2018-10-17 RX ORDER — SODIUM CHLORIDE, SODIUM LACTATE, POTASSIUM CHLORIDE, CALCIUM CHLORIDE 600; 310; 30; 20 MG/100ML; MG/100ML; MG/100ML; MG/100ML
INJECTION, SOLUTION INTRAVENOUS CONTINUOUS
Status: DISCONTINUED | OUTPATIENT
Start: 2018-10-17 | End: 2018-10-17 | Stop reason: SDUPTHER

## 2018-10-17 RX ORDER — IODINE SOLUTION STRONG 5% (LUGOL'S) 5 %
SOLUTION ORAL PRN
Status: DISCONTINUED | OUTPATIENT
Start: 2018-10-17 | End: 2018-10-17 | Stop reason: HOSPADM

## 2018-10-17 RX ORDER — ONDANSETRON 2 MG/ML
INJECTION INTRAMUSCULAR; INTRAVENOUS PRN
Status: DISCONTINUED | OUTPATIENT
Start: 2018-10-17 | End: 2018-10-17 | Stop reason: SDUPTHER

## 2018-10-17 RX ORDER — ONDANSETRON 2 MG/ML
4 INJECTION INTRAMUSCULAR; INTRAVENOUS
Status: DISCONTINUED | OUTPATIENT
Start: 2018-10-17 | End: 2018-10-17 | Stop reason: HOSPADM

## 2018-10-17 RX ORDER — ACETAMINOPHEN 325 MG/1
650 TABLET ORAL EVERY 4 HOURS PRN
Status: DISCONTINUED | OUTPATIENT
Start: 2018-10-17 | End: 2018-10-17 | Stop reason: HOSPADM

## 2018-10-17 RX ORDER — LIDOCAINE HYDROCHLORIDE 20 MG/ML
INJECTION, SOLUTION EPIDURAL; INFILTRATION; INTRACAUDAL; PERINEURAL PRN
Status: DISCONTINUED | OUTPATIENT
Start: 2018-10-17 | End: 2018-10-17 | Stop reason: SDUPTHER

## 2018-10-17 RX ORDER — ONDANSETRON 2 MG/ML
4 INJECTION INTRAMUSCULAR; INTRAVENOUS EVERY 8 HOURS PRN
Status: DISCONTINUED | OUTPATIENT
Start: 2018-10-17 | End: 2018-10-17 | Stop reason: HOSPADM

## 2018-10-17 RX ORDER — HYDROCODONE BITARTRATE AND ACETAMINOPHEN 5; 325 MG/1; MG/1
2 TABLET ORAL PRN
Status: DISCONTINUED | OUTPATIENT
Start: 2018-10-17 | End: 2018-10-17 | Stop reason: HOSPADM

## 2018-10-17 RX ORDER — KETOROLAC TROMETHAMINE 30 MG/ML
INJECTION, SOLUTION INTRAMUSCULAR; INTRAVENOUS PRN
Status: DISCONTINUED | OUTPATIENT
Start: 2018-10-17 | End: 2018-10-17 | Stop reason: SDUPTHER

## 2018-10-17 RX ORDER — SODIUM CHLORIDE 0.9 % (FLUSH) 0.9 %
10 SYRINGE (ML) INJECTION EVERY 12 HOURS SCHEDULED
Status: DISCONTINUED | OUTPATIENT
Start: 2018-10-17 | End: 2018-10-17 | Stop reason: HOSPADM

## 2018-10-17 RX ORDER — HYDROCODONE BITARTRATE AND ACETAMINOPHEN 5; 325 MG/1; MG/1
1 TABLET ORAL EVERY 6 HOURS PRN
Qty: 10 TABLET | Refills: 0 | Status: SHIPPED | OUTPATIENT
Start: 2018-10-17 | End: 2018-10-20

## 2018-10-17 RX ORDER — PROPOFOL 10 MG/ML
INJECTION, EMULSION INTRAVENOUS PRN
Status: DISCONTINUED | OUTPATIENT
Start: 2018-10-17 | End: 2018-10-17 | Stop reason: SDUPTHER

## 2018-10-17 RX ORDER — SODIUM CHLORIDE 0.9 % (FLUSH) 0.9 %
10 SYRINGE (ML) INJECTION PRN
Status: DISCONTINUED | OUTPATIENT
Start: 2018-10-17 | End: 2018-10-17 | Stop reason: HOSPADM

## 2018-10-17 RX ADMIN — MIDAZOLAM HYDROCHLORIDE 2 MG: 1 INJECTION, SOLUTION INTRAMUSCULAR; INTRAVENOUS at 10:55

## 2018-10-17 RX ADMIN — ONDANSETRON 4 MG: 2 INJECTION INTRAMUSCULAR; INTRAVENOUS at 11:23

## 2018-10-17 RX ADMIN — ACETAMINOPHEN 650 MG: 325 TABLET ORAL at 09:24

## 2018-10-17 RX ADMIN — DIMENHYDRINATE 50 MG: 50 TABLET ORAL at 09:24

## 2018-10-17 RX ADMIN — PROPOFOL 180 MG: 10 INJECTION, EMULSION INTRAVENOUS at 11:01

## 2018-10-17 RX ADMIN — SODIUM CHLORIDE, POTASSIUM CHLORIDE, SODIUM LACTATE AND CALCIUM CHLORIDE: 600; 310; 30; 20 INJECTION, SOLUTION INTRAVENOUS at 09:21

## 2018-10-17 RX ADMIN — Medication 2 G: at 10:54

## 2018-10-17 RX ADMIN — LIDOCAINE HYDROCHLORIDE 100 MG: 20 INJECTION, SOLUTION EPIDURAL; INFILTRATION; INTRACAUDAL at 11:01

## 2018-10-17 RX ADMIN — HYDROCODONE BITARTRATE AND ACETAMINOPHEN 2 TABLET: 5; 325 TABLET ORAL at 12:42

## 2018-10-17 RX ADMIN — KETOROLAC TROMETHAMINE 30 MG: 30 INJECTION, SOLUTION INTRAMUSCULAR; INTRAVENOUS at 11:23

## 2018-10-17 RX ADMIN — DEXAMETHASONE SODIUM PHOSPHATE 8 MG: 4 INJECTION, SOLUTION INTRAMUSCULAR; INTRAVENOUS at 11:11

## 2018-10-17 RX ADMIN — FENTANYL CITRATE 50 MCG: 50 INJECTION INTRAMUSCULAR; INTRAVENOUS at 11:01

## 2018-10-17 ASSESSMENT — PAIN DESCRIPTION - LOCATION
LOCATION: ABDOMEN

## 2018-10-17 ASSESSMENT — PAIN DESCRIPTION - PAIN TYPE
TYPE: SURGICAL PAIN

## 2018-10-17 ASSESSMENT — PAIN SCALES - GENERAL
PAINLEVEL_OUTOF10: 2
PAINLEVEL_OUTOF10: 2
PAINLEVEL_OUTOF10: 7
PAINLEVEL_OUTOF10: 2
PAINLEVEL_OUTOF10: 4
PAINLEVEL_OUTOF10: 7
PAINLEVEL_OUTOF10: 2

## 2018-10-17 ASSESSMENT — PAIN DESCRIPTION - DESCRIPTORS
DESCRIPTORS: CRAMPING;DISCOMFORT
DESCRIPTORS: CRAMPING;CONSTANT
DESCRIPTORS: CRAMPING;DISCOMFORT
DESCRIPTORS: CRAMPING;CONSTANT

## 2018-10-17 ASSESSMENT — PAIN DESCRIPTION - ORIENTATION
ORIENTATION: LOWER

## 2018-10-17 ASSESSMENT — PULMONARY FUNCTION TESTS
PIF_VALUE: 16
PIF_VALUE: 17
PIF_VALUE: 0
PIF_VALUE: 17
PIF_VALUE: 18
PIF_VALUE: 18
PIF_VALUE: 2
PIF_VALUE: 17
PIF_VALUE: 1
PIF_VALUE: 19
PIF_VALUE: 1
PIF_VALUE: 18
PIF_VALUE: 2
PIF_VALUE: 18
PIF_VALUE: 17
PIF_VALUE: 18
PIF_VALUE: 8
PIF_VALUE: 7
PIF_VALUE: 19
PIF_VALUE: 18
PIF_VALUE: 2
PIF_VALUE: 19
PIF_VALUE: 6
PIF_VALUE: 16
PIF_VALUE: 7
PIF_VALUE: 18
PIF_VALUE: 7
PIF_VALUE: 18
PIF_VALUE: 18
PIF_VALUE: 2
PIF_VALUE: 19
PIF_VALUE: 16
PIF_VALUE: 1
PIF_VALUE: 16

## 2018-10-17 ASSESSMENT — PAIN DESCRIPTION - FREQUENCY
FREQUENCY: CONTINUOUS

## 2018-10-18 LAB — SURGICAL PATHOLOGY REPORT: NORMAL

## 2018-11-21 ENCOUNTER — OFFICE VISIT (OUTPATIENT)
Dept: OBGYN | Age: 28
End: 2018-11-21

## 2018-11-21 VITALS
DIASTOLIC BLOOD PRESSURE: 72 MMHG | SYSTOLIC BLOOD PRESSURE: 110 MMHG | HEIGHT: 66 IN | WEIGHT: 250 LBS | BODY MASS INDEX: 40.18 KG/M2

## 2018-11-21 DIAGNOSIS — Z09 POSTOP CHECK: Primary | ICD-10-CM

## 2018-11-21 PROCEDURE — 99024 POSTOP FOLLOW-UP VISIT: CPT | Performed by: OBSTETRICS & GYNECOLOGY

## 2018-11-21 NOTE — PROGRESS NOTES
PROBLEM VISIT     Date of service: 2018    Fritz Frances  Is a 34 y.o.  female    PT's PCP is: NOREEN Lopez - CNP     : 1990                                             Subjective:       No LMP recorded (lmp unknown). OB History    Para Term  AB Living   4 2 1 1 1 2   SAB TAB Ectopic Molar Multiple Live Births   1         2      # Outcome Date GA Lbr Derek/2nd Weight Sex Delivery Anes PTL Lv   4  14 36w3d 09:45 8 lb (3.629 kg) M Vag-Spont EPI N JUDY   3 Term  37w0d  7 lb 5 oz (3.317 kg) M Vag-Spont   JDUY   2 SAB            1                     History   Smoking Status    Former Smoker   Smokeless Tobacco    Never Used        History   Alcohol Use    0.0 oz/week     Comment: socially       History   Sexual Activity    Sexual activity: Yes    Partners: Male       Allergies: Patient has no known allergies. Chief Complaint   Patient presents with    Post-Op Check     Patient presents today for post op check LEEP procedure       Last Yearly:  2018    Last pap: 2018    Last HPV: unknown      NURSE: JOSE    PE:  Vital Signs  Blood pressure 110/72, height 5' 6\" (1.676 m), weight 250 lb (113.4 kg), not currently breastfeeding. Labs:    No results found for this visit on 18. NURSE: joi    HPI: Patient is here today for a follow-up visit after her LEEP procedure. No  PT denies fever, chills, nausea and vomiting       Objective: I did review with the patient and her findings of WILLIS 3. On examination, the cervix is healing well. Assessment and Plan: I did strongly encourage the patient to follow up for repeat Pap smear          Diagnosis Orders   1. Postop check               Return in about 6 months (around 2019). FF: 10 minutes    There are no Patient Instructions on file for this visit.     Over 50%of time spent on counseling and care coordination on: see assessment and plan,  She was also counseled on her preventative health maintenance recommendations and follow-up.       Alon Gómez,11/21/2018 1:23 PM

## 2018-12-02 ENCOUNTER — HOSPITAL ENCOUNTER (EMERGENCY)
Age: 28
Discharge: HOME | End: 2018-12-02
Payer: MEDICARE

## 2018-12-02 ENCOUNTER — APPOINTMENT (OUTPATIENT)
Dept: GENERAL RADIOLOGY | Age: 28
End: 2018-12-02
Payer: MEDICARE

## 2018-12-02 VITALS
BODY MASS INDEX: 36.96 KG/M2 | SYSTOLIC BLOOD PRESSURE: 128 MMHG | OXYGEN SATURATION: 99 % | RESPIRATION RATE: 18 BRPM | DIASTOLIC BLOOD PRESSURE: 84 MMHG | HEIGHT: 66 IN | WEIGHT: 230 LBS | TEMPERATURE: 97.8 F | HEART RATE: 74 BPM

## 2018-12-02 DIAGNOSIS — T14.8XXA ABRASION: ICD-10-CM

## 2018-12-02 DIAGNOSIS — S80.01XA CONTUSION OF RIGHT KNEE, INITIAL ENCOUNTER: ICD-10-CM
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
## 2018-12-02 PROCEDURE — 73562 X-RAY EXAM OF KNEE 3: CPT

## 2018-12-02 PROCEDURE — 73502 X-RAY EXAM HIP UNI 2-3 VIEWS: CPT

## 2018-12-02 PROCEDURE — 73080 X-RAY EXAM OF ELBOW: CPT

## 2018-12-02 PROCEDURE — 99284 EMERGENCY DEPT VISIT MOD MDM: CPT

## 2018-12-02 ASSESSMENT — ENCOUNTER SYMPTOMS
RHINORRHEA: 0
COUGH: 0
EYE DISCHARGE: 0
CONSTIPATION: 0
VOMITING: 0
DIARRHEA: 0
SORE THROAT: 0
BLOOD IN STOOL: 0
ABDOMINAL PAIN: 0
NAUSEA: 0
CHEST TIGHTNESS: 0
BACK PAIN: 0
SHORTNESS OF BREATH: 0
EYE REDNESS: 0
WHEEZING: 0

## 2018-12-02 ASSESSMENT — PAIN DESCRIPTION - LOCATION: LOCATION: ARM

## 2018-12-02 ASSESSMENT — PAIN SCALES - GENERAL: PAINLEVEL_OUTOF10: 7

## 2018-12-02 ASSESSMENT — PAIN DESCRIPTION - PAIN TYPE: TYPE: ACUTE PAIN

## 2018-12-02 ASSESSMENT — PAIN DESCRIPTION - ORIENTATION: ORIENTATION: RIGHT

## 2018-12-02 ASSESSMENT — PAIN DESCRIPTION - DESCRIPTORS: DESCRIPTORS: ACHING

## 2019-05-23 ENCOUNTER — HOSPITAL ENCOUNTER (OUTPATIENT)
Age: 29
Setting detail: SPECIMEN
Discharge: HOME | End: 2019-05-23
Payer: MEDICARE

## 2019-05-23 ENCOUNTER — OFFICE VISIT (OUTPATIENT)
Dept: OBGYN | Age: 29
End: 2019-05-23
Payer: MEDICARE

## 2019-05-23 VITALS
WEIGHT: 265 LBS | SYSTOLIC BLOOD PRESSURE: 122 MMHG | DIASTOLIC BLOOD PRESSURE: 66 MMHG | BODY MASS INDEX: 42.59 KG/M2 | HEIGHT: 66 IN

## 2019-05-23 DIAGNOSIS — D06.9 CIN III (CERVICAL INTRAEPITHELIAL NEOPLASIA GRADE III) WITH SEVERE DYSPLASIA: Primary | ICD-10-CM

## 2019-05-23 DIAGNOSIS — R10.2 PELVIC PAIN IN FEMALE: ICD-10-CM

## 2019-05-23 DIAGNOSIS — R10.814 LEFT LOWER QUADRANT ABDOMINAL TENDERNESS WITHOUT REBOUND TENDERNESS: ICD-10-CM

## 2019-05-23 DIAGNOSIS — D06.9 CIN III (CERVICAL INTRAEPITHELIAL NEOPLASIA GRADE III) WITH SEVERE DYSPLASIA: ICD-10-CM

## 2019-05-23 PROCEDURE — 87491 CHLMYD TRACH DNA AMP PROBE: CPT

## 2019-05-23 PROCEDURE — G8417 CALC BMI ABV UP PARAM F/U: HCPCS | Performed by: OBSTETRICS & GYNECOLOGY

## 2019-05-23 PROCEDURE — G8427 DOCREV CUR MEDS BY ELIG CLIN: HCPCS | Performed by: OBSTETRICS & GYNECOLOGY

## 2019-05-23 PROCEDURE — 1036F TOBACCO NON-USER: CPT | Performed by: OBSTETRICS & GYNECOLOGY

## 2019-05-23 PROCEDURE — 88175 CYTOPATH C/V AUTO FLUID REDO: CPT

## 2019-05-23 PROCEDURE — 99213 OFFICE O/P EST LOW 20 MIN: CPT | Performed by: OBSTETRICS & GYNECOLOGY

## 2019-05-23 PROCEDURE — 87591 N.GONORRHOEAE DNA AMP PROB: CPT

## 2019-05-23 NOTE — PROGRESS NOTES
PROBLEM VISIT     Date of service: 2019    Shonna Steel  Is a 34 y.o.  female    PT's PCP is: Valentin iKng, NOREEN - CNP     : 1990                                             Subjective:       Patient's last menstrual period was 2019. OB History    Para Term  AB Living   4 2 1 1 1 2   SAB TAB Ectopic Molar Multiple Live Births   1         2      # Outcome Date GA Lbr Derek/2nd Weight Sex Delivery Anes PTL Lv   4  14 36w3d 09:45 8 lb (3.629 kg) M Vag-Spont EPI N JUDY   3 Term  37w0d  7 lb 5 oz (3.317 kg) M Vag-Spont   JUDY   2 SAB            1                  Social History     Tobacco Use   Smoking Status Former Smoker   Smokeless Tobacco Never Used        Social History     Substance and Sexual Activity   Alcohol Use Yes    Alcohol/week: 0.0 oz    Comment: socially       Social History     Substance and Sexual Activity   Sexual Activity Yes    Partners: Male       Allergies: Patient has no known allergies. Chief Complaint   Patient presents with    Abnormal Pap Smear     F/U LEEP for WILLIS III       Last Yearly:  2018    Last pap: 2018    Last HPV: 2013      NURSE: JOSE    PE:  Vital Signs  Blood pressure 122/66, height 5' 6\" (1.676 m), weight 265 lb (120.2 kg), last menstrual period 2019, not currently breastfeeding. Labs:    No results found for this visit on 19. HPI: Patient is here today for a Pap smear after WILLIS-3 and LEEP. Also of significance she is complaining of acute onset of left lower quadrant tenderness and dyspareunia. Neither of these problems are severe but both have started over the past 1-2 months. No PT denies fever, chills, nausea and vomiting       Objective     GI: Abdomen soft, non-tender.  BS normal. No masses,  No organomegaly, obesity noted              Pelvic Exam: GENITAL/URINARY:  External Genitalia:  General appearance; normal, Hair distribution; normal, Lesions absent  Vagina:  General appearance normal, Estrogen effect normal, Discharge absent, Lesions absent, Pelvic support normal  Cervix:  General appearance normal, Lesions absent, Discharge absent, Tenderness absent, Enlargement absent, Nodularity absent, cervix well-healed  Uterus:  Size normal, Contour normal, Position normal, Masses absent, Consistency; normal, Support normal, Tenderness absent  Adenexa: Masses absent, Tenderness absent, Enlargement absent, Nodularity absent                                    Vaginal discharge: no vaginal discharge                          Assessment and Plan: Will order ultrasound cervical cultures and urinalysis. Diagnosis Orders   1. WILLIS III (cervical intraepithelial neoplasia grade III) with severe dysplasia  PAP SMEAR   2. Left lower quadrant abdominal tenderness without rebound tenderness  Urinalysis Reflex to Culture    US Pelvis Complete             I am having Hailee M. Phoenix maintain her Melatonin. No follow-ups on file. There are no Patient Instructions on file for this visit. Over 50% of time spent on counseling and care coordination on: see assessment and plan,  She was also counseled on her preventative health maintenance recommendations and follow-up.         FF time: 15 min      Ann Gómez,5/23/2019 10:12 AM

## 2019-05-26 LAB
CHLAMYDIA BY THIN PREP: NEGATIVE
N. GONORRHOEAE DNA, THIN PREP: NEGATIVE
SPECIMEN DESCRIPTION: NORMAL

## 2019-05-30 ENCOUNTER — HOSPITAL ENCOUNTER (OUTPATIENT)
Dept: ULTRASOUND IMAGING | Age: 29
Discharge: HOME | End: 2019-06-01
Payer: MEDICARE

## 2019-05-30 ENCOUNTER — HOSPITAL ENCOUNTER (OUTPATIENT)
Age: 29
Setting detail: SPECIMEN
Discharge: HOME | End: 2019-05-30
Payer: MEDICARE

## 2019-05-30 DIAGNOSIS — N23 KIDNEY PAIN: Primary | ICD-10-CM

## 2019-05-30 DIAGNOSIS — R10.814 LEFT LOWER QUADRANT ABDOMINAL TENDERNESS WITHOUT REBOUND TENDERNESS: ICD-10-CM

## 2019-05-30 DIAGNOSIS — R10.814 LEFT LOWER QUADRANT ABDOMINAL TENDERNESS WITHOUT REBOUND TENDERNESS: Primary | ICD-10-CM

## 2019-05-30 DIAGNOSIS — N23 KIDNEY PAIN: ICD-10-CM

## 2019-05-30 PROCEDURE — 87086 URINE CULTURE/COLONY COUNT: CPT

## 2019-05-30 PROCEDURE — 76830 TRANSVAGINAL US NON-OB: CPT

## 2019-05-30 PROCEDURE — 76856 US EXAM PELVIC COMPLETE: CPT

## 2019-05-31 LAB
CULTURE: NORMAL
CYTOLOGY REPORT: NORMAL
Lab: NORMAL
SPECIMEN DESCRIPTION: NORMAL

## 2019-07-17 ENCOUNTER — HOSPITAL ENCOUNTER (OUTPATIENT)
Age: 29
Discharge: HOME | End: 2019-07-17
Payer: MEDICARE

## 2019-07-17 ENCOUNTER — HOSPITAL ENCOUNTER (EMERGENCY)
Age: 29
Discharge: HOME | End: 2019-07-17
Attending: EMERGENCY MEDICINE
Payer: MEDICARE

## 2019-07-17 VITALS
SYSTOLIC BLOOD PRESSURE: 138 MMHG | DIASTOLIC BLOOD PRESSURE: 94 MMHG | HEART RATE: 87 BPM | OXYGEN SATURATION: 96 % | TEMPERATURE: 99.2 F | RESPIRATION RATE: 14 BRPM

## 2019-07-17 DIAGNOSIS — R10.9 NONSPECIFIC ABDOMINAL PAIN: Primary | ICD-10-CM

## 2019-07-17 DIAGNOSIS — R11.2 NON-INTRACTABLE VOMITING WITH NAUSEA, UNSPECIFIED VOMITING TYPE: ICD-10-CM

## 2019-07-17 LAB
-: ABNORMAL
ABSOLUTE EOS #: 0.18 K/UL (ref 0–0.44)
ABSOLUTE IMMATURE GRANULOCYTE: <0.03 K/UL (ref 0–0.3)
ABSOLUTE LYMPH #: 2.57 K/UL (ref 1.1–3.7)
ABSOLUTE MONO #: 0.5 K/UL (ref 0.1–1.2)
ALBUMIN SERPL-MCNC: 4.5 G/DL (ref 3.5–5.2)
ALBUMIN/GLOBULIN RATIO: 1.6 (ref 1–2.5)
ALP BLD-CCNC: 56 U/L (ref 35–104)
ALT SERPL-CCNC: 130 U/L (ref 5–33)
AMORPHOUS: ABNORMAL
AMYLASE: 58 U/L (ref 28–100)
ANION GAP SERPL CALCULATED.3IONS-SCNC: 9 MMOL/L (ref 9–17)
AST SERPL-CCNC: 57 U/L
BACTERIA: ABNORMAL
BASOPHILS # BLD: 0 % (ref 0–2)
BASOPHILS ABSOLUTE: <0.03 K/UL (ref 0–0.2)
BILIRUB SERPL-MCNC: 0.31 MG/DL (ref 0.3–1.2)
BILIRUBIN URINE: NEGATIVE
BUN BLDV-MCNC: 11 MG/DL (ref 6–20)
BUN/CREAT BLD: 16 (ref 9–20)
CALCIUM SERPL-MCNC: 8.8 MG/DL (ref 8.6–10.4)
CASTS UA: ABNORMAL /LPF
CHLORIDE BLD-SCNC: 104 MMOL/L (ref 98–107)
CO2: 27 MMOL/L (ref 20–31)
COLOR: YELLOW
COMMENT UA: ABNORMAL
CREAT SERPL-MCNC: 0.7 MG/DL (ref 0.5–0.9)
CRYSTALS, UA: ABNORMAL /HPF
DIFFERENTIAL TYPE: NORMAL
EOSINOPHILS RELATIVE PERCENT: 3 % (ref 1–4)
EPITHELIAL CELLS UA: ABNORMAL /HPF (ref 0–25)
GFR AFRICAN AMERICAN: >60 ML/MIN
GFR NON-AFRICAN AMERICAN: >60 ML/MIN
GFR SERPL CREATININE-BSD FRML MDRD: ABNORMAL ML/MIN/{1.73_M2}
GFR SERPL CREATININE-BSD FRML MDRD: ABNORMAL ML/MIN/{1.73_M2}
GLUCOSE BLD-MCNC: 132 MG/DL (ref 70–99)
GLUCOSE URINE: NEGATIVE
HCG QUALITATIVE: NEGATIVE
HCT VFR BLD CALC: 43.4 % (ref 36.3–47.1)
HEMOGLOBIN: 14.2 G/DL (ref 11.9–15.1)
IMMATURE GRANULOCYTES: 0 %
KETONES, URINE: NEGATIVE
LEUKOCYTE ESTERASE, URINE: ABNORMAL
LIPASE: 34 U/L (ref 13–60)
LYMPHOCYTES # BLD: 42 % (ref 24–43)
MCH RBC QN AUTO: 30.8 PG (ref 25.2–33.5)
MCHC RBC AUTO-ENTMCNC: 32.7 G/DL (ref 28.4–34.8)
MCV RBC AUTO: 94.1 FL (ref 82.6–102.9)
MONOCYTES # BLD: 8 % (ref 3–12)
MUCUS: ABNORMAL
NITRITE, URINE: NEGATIVE
NRBC AUTOMATED: 0 PER 100 WBC
OTHER OBSERVATIONS UA: ABNORMAL
PDW BLD-RTO: 13.1 % (ref 11.8–14.4)
PH UA: 8 (ref 5–9)
PLATELET # BLD: 231 K/UL (ref 138–453)
PLATELET ESTIMATE: NORMAL
PMV BLD AUTO: 9.5 FL (ref 8.1–13.5)
POTASSIUM SERPL-SCNC: 4.3 MMOL/L (ref 3.7–5.3)
PROTEIN UA: NEGATIVE
RBC # BLD: 4.61 M/UL (ref 3.95–5.11)
RBC # BLD: NORMAL 10*6/UL
RBC UA: ABNORMAL /HPF (ref 0–2)
RENAL EPITHELIAL, UA: ABNORMAL /HPF
SEG NEUTROPHILS: 47 % (ref 36–65)
SEGMENTED NEUTROPHILS ABSOLUTE COUNT: 2.92 K/UL (ref 1.5–8.1)
SODIUM BLD-SCNC: 140 MMOL/L (ref 135–144)
SPECIFIC GRAVITY UA: 1.01 (ref 1.01–1.02)
TOTAL PROTEIN: 7.3 G/DL (ref 6.4–8.3)
TRICHOMONAS: ABNORMAL
TURBIDITY: ABNORMAL
URINE HGB: NEGATIVE
UROBILINOGEN, URINE: NORMAL
WBC # BLD: 6.2 K/UL (ref 3.5–11.3)
WBC # BLD: NORMAL 10*3/UL
WBC UA: ABNORMAL /HPF (ref 0–5)
YEAST: ABNORMAL

## 2019-07-17 PROCEDURE — 80053 COMPREHEN METABOLIC PANEL: CPT

## 2019-07-17 PROCEDURE — 6360000002 HC RX W HCPCS: Performed by: EMERGENCY MEDICINE

## 2019-07-17 PROCEDURE — 99284 EMERGENCY DEPT VISIT MOD MDM: CPT

## 2019-07-17 PROCEDURE — 36415 COLL VENOUS BLD VENIPUNCTURE: CPT

## 2019-07-17 PROCEDURE — 84703 CHORIONIC GONADOTROPIN ASSAY: CPT

## 2019-07-17 PROCEDURE — 81001 URINALYSIS AUTO W/SCOPE: CPT

## 2019-07-17 PROCEDURE — 82150 ASSAY OF AMYLASE: CPT

## 2019-07-17 PROCEDURE — 96374 THER/PROPH/DIAG INJ IV PUSH: CPT

## 2019-07-17 PROCEDURE — 85025 COMPLETE CBC W/AUTO DIFF WBC: CPT

## 2019-07-17 PROCEDURE — 96375 TX/PRO/DX INJ NEW DRUG ADDON: CPT

## 2019-07-17 PROCEDURE — 83690 ASSAY OF LIPASE: CPT

## 2019-07-17 PROCEDURE — 2580000003 HC RX 258: Performed by: EMERGENCY MEDICINE

## 2019-07-17 RX ORDER — METOCLOPRAMIDE HYDROCHLORIDE 5 MG/ML
10 INJECTION INTRAMUSCULAR; INTRAVENOUS ONCE
Status: COMPLETED | OUTPATIENT
Start: 2019-07-17 | End: 2019-07-17

## 2019-07-17 RX ORDER — ONDANSETRON 4 MG/1
4 TABLET, FILM COATED ORAL 3 TIMES DAILY PRN
Qty: 21 TABLET | Refills: 0 | Status: SHIPPED | OUTPATIENT
Start: 2019-07-17 | End: 2019-07-24

## 2019-07-17 RX ORDER — 0.9 % SODIUM CHLORIDE 0.9 %
1000 INTRAVENOUS SOLUTION INTRAVENOUS ONCE
Status: COMPLETED | OUTPATIENT
Start: 2019-07-17 | End: 2019-07-17

## 2019-07-17 RX ORDER — DICYCLOMINE HCL 20 MG
20 TABLET ORAL 4 TIMES DAILY PRN
Qty: 28 TABLET | Refills: 0 | Status: SHIPPED | OUTPATIENT
Start: 2019-07-17 | End: 2020-09-28 | Stop reason: CLARIF

## 2019-07-17 RX ORDER — KETOROLAC TROMETHAMINE 30 MG/ML
30 INJECTION, SOLUTION INTRAMUSCULAR; INTRAVENOUS ONCE
Status: COMPLETED | OUTPATIENT
Start: 2019-07-17 | End: 2019-07-17

## 2019-07-17 RX ORDER — DIPHENHYDRAMINE HYDROCHLORIDE 50 MG/ML
25 INJECTION INTRAMUSCULAR; INTRAVENOUS ONCE
Status: COMPLETED | OUTPATIENT
Start: 2019-07-17 | End: 2019-07-17

## 2019-07-17 RX ADMIN — DIPHENHYDRAMINE HYDROCHLORIDE 25 MG: 50 INJECTION, SOLUTION INTRAMUSCULAR; INTRAVENOUS at 21:57

## 2019-07-17 RX ADMIN — SODIUM CHLORIDE 1000 ML: 9 INJECTION, SOLUTION INTRAVENOUS at 21:56

## 2019-07-17 RX ADMIN — KETOROLAC TROMETHAMINE 30 MG: 30 INJECTION, SOLUTION INTRAMUSCULAR at 21:59

## 2019-07-17 RX ADMIN — METOCLOPRAMIDE 10 MG: 5 INJECTION, SOLUTION INTRAMUSCULAR; INTRAVENOUS at 21:58

## 2019-07-17 ASSESSMENT — ENCOUNTER SYMPTOMS
SORE THROAT: 0
COLOR CHANGE: 0
VOMITING: 1
ABDOMINAL PAIN: 1
COUGH: 0
SHORTNESS OF BREATH: 0
NAUSEA: 1
TROUBLE SWALLOWING: 0
DIARRHEA: 0

## 2019-07-17 ASSESSMENT — PAIN DESCRIPTION - PAIN TYPE: TYPE: ACUTE PAIN

## 2019-07-17 ASSESSMENT — PAIN SCALES - GENERAL
PAINLEVEL_OUTOF10: 7
PAINLEVEL_OUTOF10: 7

## 2019-07-17 ASSESSMENT — PAIN DESCRIPTION - LOCATION: LOCATION: ABDOMEN;HEAD

## 2019-07-18 NOTE — ED PROVIDER NOTES
risk HPV 7/16/2013    Asthma childhood    Bipolar disorder ([facility] [address]     Heart abnormality     HSIL on Pap smear 8/16/2012    PCOS (polycystic ovarian syndrome)     Postpartum depression     Type AB blood, Rh positive        SURGICAL HISTORY       Past Surgical History:   Procedure Laterality Date    DILATION AND CURETTAGE  age 17    miscarriage    DILATION AND CURETTAGE OF UTERUS  10/17/2018    Dr Lizz Schwab N/A 10/17/2018    DILATATION AND CURETTAGE LEEP- ECC performed by Dolores Quick MD at [address]       Previous Medications    MELATONIN 2.5 MG CHEW    Take by mouth 2 times daily as needed        ALLERGIES     Patient has no known allergies. FAMILY HISTORY       Family History   Problem Relation Age of Onset    Diabetes Father         double amputee        SOCIAL HISTORY       Social History     Socioeconomic History    Marital status:      Spouse name: Not on file    Number of children: Not on file    Years of education: Not on file    Highest education level: Not on file   Occupational History     Employer: [employer]   Social Needs    Financial resource strain: Not on file    Food insecurity:     Worry: Not on file     Inability: Not on file    Transportation needs:     Medical: Not on file     Non-medical: Not on file   Tobacco Use    Smoking status: Former Smoker    Smokeless tobacco: Never Used   Substance and Sexual Activity    Alcohol use:  Yes     Alcohol/week: 0.0 standard drinks     Comment: socially    Drug use: No    Sexual activity: Yes     Partners: Male   Lifestyle    Physical activity:     Days per week: Not on file     Minutes per session: Not on file    Stress: Not on file   Relationships    Social connections:     Talks on phone: Not on file     Gets together: Not on file     Attends Zoroastrian service: Not on file     Active member of club or organization: Not on file     Attends meetings of clubs or organizations: Not on file     Relationship status: Not on file    Intimate partner violence:     Fear of current or ex partner: Not on file     Emotionally abused: Not on file     Physically abused: Not on file     Forced sexual activity: Not on file   Other Topics Concern    Not on file   Social History Narrative    Not on file       SCREENINGS    Johnnie Coma Scale  Eye Opening: Spontaneous  Best Verbal Response: Oriented  Best Motor Response: Obeys commands  Bronson Coma Scale Score: 15      PHYSICAL EXAM    (up to 7 for level 4, 8 or more for level 5)   [facility]    Physical Exam   Constitutional: She is oriented to person, place, and time. She appears well-developed and well-nourished. No distress. HENT:   Head: Normocephalic and atraumatic. Nose: Nose normal.   Mouth/Throat: Oropharynx is clear and moist. No oropharyngeal exudate. Eyes: Pupils are equal, round, and reactive to light. Conjunctivae and EOM are normal. Right eye exhibits no discharge. Left eye exhibits no discharge. No scleral icterus. Neck: Normal range of motion. Neck supple. No meningeal signs   Cardiovascular: Normal rate, regular rhythm, normal heart sounds and intact distal pulses. Exam reveals no gallop and no friction rub. No murmur heard. Radial pulses are plus 2 out of 4 bilaterally they are equal and symmetric   Pulmonary/Chest: Effort normal and breath sounds normal. No stridor. No respiratory distress. She has no wheezes. Abdominal: Soft. She exhibits no distension. There is no tenderness. There is no guarding. Obese soft and nondistended with hyperactive bowel sounds. There is no pain elicited with palpation. No voluntary guarding or rigidity no pulsatile mass   Musculoskeletal: Normal range of motion. She exhibits no edema, tenderness or deformity. Neurological: She is alert and oriented to person, place, and time. No cranial nerve deficit or sensory deficit.    Cranial nerves II which did help her headache as well as her abdomen. Therefore she will be discharged home with symptomatic medications    REVAL:         CRITICAL CARE TIME   Total Critical Care time was minutes, excluding separately reportable procedures. There was a high probability of clinically significant/life threatening deterioration in the patient's condition which required my urgent intervention. CONSULTS:  None    PROCEDURES:  Unless otherwise noted below, none     Procedures    FINAL IMPRESSION      1. Nonspecific abdominal pain    2. Non-intractable vomiting with nausea, unspecified vomiting type          DISPOSITION/PLAN   DISPOSITION        PATIENT REFERRED TO:  NOREEN Bingham  [phone]    Schedule an appointment as soon as possible for a visit in 1 week  If symptoms worsen      DISCHARGE MEDICATIONS:  New Prescriptions    DICYCLOMINE (BENTYL) 20 MG TABLET    Take 1 tablet by mouth 4 times daily as needed (Abdominal pain/spasm)    ONDANSETRON (ZOFRAN) 4 MG TABLET    Take 1 tablet by mouth 3 times daily as needed for Nausea or Vomiting          (Please note:  Portions of this note were completed with a voice recognition program.  Efforts were made to edit the dictations but occasionally words and phrases are mis-transcribed.)  Form v2016. J.5-cn    Akil Ruvalcaba DO (electronically signed)  Emergency Medicine Provider        DO Campbell  07/17/19 7170

## 2021-01-11 ENCOUNTER — HOSPITAL ENCOUNTER (OUTPATIENT)
Age: 31
Setting detail: SPECIMEN
Discharge: HOME | End: 2021-01-11
Payer: MEDICARE

## 2021-01-11 ENCOUNTER — OFFICE VISIT (OUTPATIENT)
Dept: OBGYN | Age: 31
End: 2021-01-11
Payer: MEDICARE

## 2021-01-11 VITALS
WEIGHT: 261 LBS | SYSTOLIC BLOOD PRESSURE: 125 MMHG | DIASTOLIC BLOOD PRESSURE: 76 MMHG | BODY MASS INDEX: 41.95 KG/M2 | HEIGHT: 66 IN

## 2021-01-11 DIAGNOSIS — Z01.419 WOMEN'S ANNUAL ROUTINE GYNECOLOGICAL EXAMINATION: Primary | ICD-10-CM

## 2021-01-11 DIAGNOSIS — Z01.419 WOMEN'S ANNUAL ROUTINE GYNECOLOGICAL EXAMINATION: ICD-10-CM

## 2021-01-11 PROCEDURE — 99395 PREV VISIT EST AGE 18-39: CPT | Performed by: OBSTETRICS & GYNECOLOGY

## 2021-01-11 PROCEDURE — G8484 FLU IMMUNIZE NO ADMIN: HCPCS | Performed by: OBSTETRICS & GYNECOLOGY

## 2021-01-11 PROCEDURE — G0145 SCR C/V CYTO,THINLAYER,RESCR: HCPCS

## 2021-01-11 ASSESSMENT — PATIENT HEALTH QUESTIONNAIRE - PHQ9
2. FEELING DOWN, DEPRESSED OR HOPELESS: 0
1. LITTLE INTEREST OR PLEASURE IN DOING THINGS: 0
SUM OF ALL RESPONSES TO PHQ9 QUESTIONS 1 & 2: 0

## 2021-01-11 NOTE — PROGRESS NOTES
YEARLY PHYSICAL    Date of service: 2021    Ramin Elizabeth  Is a 31 y.o.   female    PT's PCP is: Gonzales Penny, NOREEN - CNP     : 1990                                             Subjective:       No LMP recorded (lmp unknown). (Menstrual status: Irregular periods). Are your menses regular: no    OB History    Para Term  AB Living   4 2 1 1 1 2   SAB TAB Ectopic Molar Multiple Live Births   1         2      # Outcome Date GA Lbr Derek/2nd Weight Sex Delivery Anes PTL Lv   4  14 36w3d 09:45 8 lb (3.629 kg) M Vag-Spont EPI N JUDY   3 Term  37w0d  7 lb 5 oz (3.317 kg) M Vag-Spont   JUDY   2 SAB            1                  Social History     Tobacco Use   Smoking Status Former Smoker    Types: Cigarettes   Smokeless Tobacco Never Used        Social History     Substance and Sexual Activity   Alcohol Use Yes    Alcohol/week: 0.0 standard drinks    Comment: socially       Family History   Problem Relation Age of Onset    Diabetes Father         double amputee       Any family history of breast or ovarian cancer: Yes    Any family history of blood clots: No      Allergies: Patient has no known allergies.       Current Outpatient Medications:     QUEtiapine (SEROQUEL) 100 MG tablet, Take 100 mg by mouth 1/2 tablet in am, 1 tab at hs, Disp: , Rfl:     OXcarbazepine (TRILEPTAL) 300 MG tablet, Take 300 mg by mouth 2 times daily, Disp: , Rfl:     buPROPion (WELLBUTRIN XL) 300 MG extended release tablet, Take 1 tablet by mouth every morning, Disp: 30 tablet, Rfl: 3    Social History     Substance and Sexual Activity   Sexual Activity Yes    Partners: Male       Any bleeding or pain with intercourse: No    Last Yearly:  19    Last pap: 19    Last HPV: never    Last Mammogram: 17    Last Dexascan never    Last colorectal screen- type: never  date  NA Do you do self breast exams: Yes    Past Medical History:   Diagnosis Date    Allergy     seasonal    Anxiety     ASCUS with positive high risk HPV 7/16/2013    Asthma childhood    B[facility] disorder ([facility] [address]     Bipolar disorder ([facility] [address]     Heart abnormality     HSIL on Pap smear 8/16/2012    PCOS (polycystic ovarian syndrome)     Postpartum depression     Type AB blood, Rh positive        Past Surgical History:   Procedure Laterality Date    DILATION AND CURETTAGE  age 17    miscarriage    DILATION AND CURETTAGE OF UTERUS  10/17/2018    Dr Rudi Paul- Alie Murguia N/A 10/17/2018    DILATATION AND CURETTAGE LEEP- ECC performed by Alec Pierre MD at [address]       Family History   Problem Relation Age of Onset    Diabetes Father         double amputee       Chief Complaint   Patient presents with    Gynecologic Exam     yearly pap          PE:  Vital Signs  Blood pressure 125/76, height 5' 6\" (1.676 m), weight 261 lb (118.4 kg), not currently breastfeeding. Estimated body mass index is 42.13 kg/m² as calculated from the following:    Height as of this encounter: 5' 6\" (1.676 m). Weight as of this encounter: 261 lb (118.4 kg). Labs:    No results found for this visit on 01/11/21. NURSE: MEGHAN    HPI: The patient is here today for a yearly exam.  She does have history of WILLIS-3 and LEEP procedure. He also has history of polycystic ovaries with irregular cycles. At this time, she does not wish to use any medications for this. Review of Systems      Objective  Lymphatic:   no lymphadenopathy  Heent:   negative   Cor: regular rate and rhythm, no murmurs              Pul:clear to auscultation bilaterally- no wheezes, rales or rhonchi, normal air movement, no respiratory distress      GI: Abdomen soft, non-tender.  BS normal. No masses,  No organomegaly           Extremities: normal strength, tone, and muscle mass Breasts: Breast:normal appearance, no masses or tenderness, Inspection negative, No nipple retraction or dimpling, No nipple discharge or bleeding, No axillary or supraclavicular adenopathy, Normal to palpation without dominant masses   Pelvic Exam: GENITAL/URINARY:  External Genitalia:  General appearance; normal, Hair distribution; normal, Lesions absent  Vagina:  General appearance normal, Estrogen effect normal, Discharge absent, Lesions absent, Pelvic support normal  Cervix:  General appearance normal, Lesions absent, Discharge absent, Tenderness absent, Enlargement absent, Nodularity absent, minimal scarring noted from the LEEP procedure  Uterus:  Size normal, Contour normal, Position normal, Masses absent, Consistency; normal, Support normal, Tenderness absent  Adenexa: Masses absent, Tenderness absent, Enlargement absent, Nodularity absent                                    Vaginal discharge: no vaginal discharge    Assessment/plan: Routine care, will await results of Pap smear.   Patient is thinking about having bilateral salpingectomy for ovarian cancer prevention I did feel it appropriate to wait for her Pap smear results

## 2021-01-20 LAB — CYTOLOGY REPORT: NORMAL

## 2021-04-06 ENCOUNTER — APPOINTMENT (OUTPATIENT)
Dept: CT IMAGING | Age: 31
End: 2021-04-06
Payer: MEDICARE

## 2021-04-06 ENCOUNTER — HOSPITAL ENCOUNTER (EMERGENCY)
Age: 31
Discharge: HOME | End: 2021-04-06
Attending: EMERGENCY MEDICINE
Payer: MEDICARE

## 2021-04-06 VITALS
DIASTOLIC BLOOD PRESSURE: 56 MMHG | BODY MASS INDEX: 39.94 KG/M2 | TEMPERATURE: 98.4 F | HEART RATE: 70 BPM | OXYGEN SATURATION: 96 % | SYSTOLIC BLOOD PRESSURE: 113 MMHG | WEIGHT: 240 LBS | RESPIRATION RATE: 14 BRPM

## 2021-04-06 DIAGNOSIS — R10.9 FLANK PAIN: Primary | ICD-10-CM

## 2021-04-06 LAB
-: ABNORMAL
ABSOLUTE EOS #: 0.19 K/UL (ref 0–0.44)
ABSOLUTE IMMATURE GRANULOCYTE: <0.03 K/UL (ref 0–0.3)
ABSOLUTE LYMPH #: 2.61 K/UL (ref 1.1–3.7)
ABSOLUTE MONO #: 0.58 K/UL (ref 0.1–1.2)
ALBUMIN SERPL-MCNC: 4.5 G/DL (ref 3.5–5.2)
ALBUMIN/GLOBULIN RATIO: 1.6 (ref 1–2.5)
ALP BLD-CCNC: 67 U/L (ref 35–104)
ALT SERPL-CCNC: 133 U/L (ref 5–33)
AMORPHOUS: ABNORMAL
ANION GAP SERPL CALCULATED.3IONS-SCNC: 10 MMOL/L (ref 9–17)
AST SERPL-CCNC: 78 U/L
BACTERIA: ABNORMAL
BASOPHILS # BLD: 1 % (ref 0–2)
BASOPHILS ABSOLUTE: 0.04 K/UL (ref 0–0.2)
BILIRUB SERPL-MCNC: 0.34 MG/DL (ref 0.3–1.2)
BILIRUBIN URINE: NEGATIVE
BUN BLDV-MCNC: 16 MG/DL (ref 6–20)
BUN/CREAT BLD: 19 (ref 9–20)
CALCIUM SERPL-MCNC: 10.3 MG/DL (ref 8.6–10.4)
CASTS UA: ABNORMAL /LPF
CHLORIDE BLD-SCNC: 102 MMOL/L (ref 98–107)
CO2: 26 MMOL/L (ref 20–31)
COLOR: YELLOW
COMMENT UA: ABNORMAL
CREAT SERPL-MCNC: 0.84 MG/DL (ref 0.5–0.9)
CRYSTALS, UA: ABNORMAL /HPF
D-DIMER QUANTITATIVE: <0.27 MG/L FEU (ref 0–0.59)
DIFFERENTIAL TYPE: ABNORMAL
EOSINOPHILS RELATIVE PERCENT: 3 % (ref 1–4)
EPITHELIAL CELLS UA: ABNORMAL /HPF (ref 0–25)
GFR AFRICAN AMERICAN: >60 ML/MIN
GFR NON-AFRICAN AMERICAN: >60 ML/MIN
GFR SERPL CREATININE-BSD FRML MDRD: ABNORMAL ML/MIN/{1.73_M2}
GFR SERPL CREATININE-BSD FRML MDRD: ABNORMAL ML/MIN/{1.73_M2}
GLUCOSE BLD-MCNC: 258 MG/DL (ref 70–99)
GLUCOSE URINE: ABNORMAL
HCG(URINE) PREGNANCY TEST: NEGATIVE
HCT VFR BLD CALC: 44.1 % (ref 36.3–47.1)
HEMOGLOBIN: 15.3 G/DL (ref 11.9–15.1)
IMMATURE GRANULOCYTES: 0 %
KETONES, URINE: NEGATIVE
LEUKOCYTE ESTERASE, URINE: ABNORMAL
LYMPHOCYTES # BLD: 36 % (ref 24–43)
MCH RBC QN AUTO: 31.7 PG (ref 25.2–33.5)
MCHC RBC AUTO-ENTMCNC: 34.7 G/DL (ref 28.4–34.8)
MCV RBC AUTO: 91.3 FL (ref 82.6–102.9)
MONOCYTES # BLD: 8 % (ref 3–12)
MUCUS: ABNORMAL
NITRITE, URINE: NEGATIVE
NRBC AUTOMATED: 0 PER 100 WBC
OTHER OBSERVATIONS UA: ABNORMAL
PDW BLD-RTO: 12.3 % (ref 11.8–14.4)
PH UA: 5.5 (ref 5–9)
PLATELET # BLD: 213 K/UL (ref 138–453)
PLATELET ESTIMATE: ABNORMAL
PMV BLD AUTO: 9.3 FL (ref 8.1–13.5)
POTASSIUM SERPL-SCNC: 4.2 MMOL/L (ref 3.7–5.3)
PROTEIN UA: ABNORMAL
RBC # BLD: 4.83 M/UL (ref 3.95–5.11)
RBC # BLD: ABNORMAL 10*6/UL
RBC UA: ABNORMAL /HPF (ref 0–2)
RENAL EPITHELIAL, UA: ABNORMAL /HPF
SEG NEUTROPHILS: 52 % (ref 36–65)
SEGMENTED NEUTROPHILS ABSOLUTE COUNT: 3.85 K/UL (ref 1.5–8.1)
SODIUM BLD-SCNC: 138 MMOL/L (ref 135–144)
SPECIFIC GRAVITY UA: 1.02 (ref 1.01–1.02)
TOTAL PROTEIN: 7.4 G/DL (ref 6.4–8.3)
TRICHOMONAS: ABNORMAL
TURBIDITY: CLEAR
URINE HGB: ABNORMAL
UROBILINOGEN, URINE: NORMAL
WBC # BLD: 7.3 K/UL (ref 3.5–11.3)
WBC # BLD: ABNORMAL 10*3/UL
WBC UA: ABNORMAL /HPF (ref 0–5)
YEAST: ABNORMAL

## 2021-04-06 PROCEDURE — 99285 EMERGENCY DEPT VISIT HI MDM: CPT

## 2021-04-06 PROCEDURE — 6360000002 HC RX W HCPCS: Performed by: EMERGENCY MEDICINE

## 2021-04-06 PROCEDURE — 80053 COMPREHEN METABOLIC PANEL: CPT

## 2021-04-06 PROCEDURE — 6370000000 HC RX 637 (ALT 250 FOR IP): Performed by: EMERGENCY MEDICINE

## 2021-04-06 PROCEDURE — 96374 THER/PROPH/DIAG INJ IV PUSH: CPT

## 2021-04-06 PROCEDURE — 81001 URINALYSIS AUTO W/SCOPE: CPT

## 2021-04-06 PROCEDURE — 74176 CT ABD & PELVIS W/O CONTRAST: CPT

## 2021-04-06 PROCEDURE — 81025 URINE PREGNANCY TEST: CPT

## 2021-04-06 PROCEDURE — 2580000003 HC RX 258: Performed by: EMERGENCY MEDICINE

## 2021-04-06 PROCEDURE — 96375 TX/PRO/DX INJ NEW DRUG ADDON: CPT

## 2021-04-06 PROCEDURE — 85025 COMPLETE CBC W/AUTO DIFF WBC: CPT

## 2021-04-06 PROCEDURE — 85379 FIBRIN DEGRADATION QUANT: CPT

## 2021-04-06 PROCEDURE — 36415 COLL VENOUS BLD VENIPUNCTURE: CPT

## 2021-04-06 RX ORDER — IBUPROFEN 600 MG/1
600 TABLET ORAL 3 TIMES DAILY PRN
Qty: 30 TABLET | Refills: 0 | Status: SHIPPED | OUTPATIENT
Start: 2021-04-06

## 2021-04-06 RX ORDER — KETOROLAC TROMETHAMINE 15 MG/ML
30 INJECTION, SOLUTION INTRAMUSCULAR; INTRAVENOUS ONCE
Status: COMPLETED | OUTPATIENT
Start: 2021-04-06 | End: 2021-04-06

## 2021-04-06 RX ORDER — HYDROCODONE BITARTRATE AND ACETAMINOPHEN 5; 325 MG/1; MG/1
1 TABLET ORAL EVERY 4 HOURS PRN
Qty: 30 TABLET | Refills: 0 | Status: SHIPPED | OUTPATIENT
Start: 2021-04-06 | End: 2021-04-11

## 2021-04-06 RX ORDER — SODIUM CHLORIDE 9 MG/ML
1000 INJECTION, SOLUTION INTRAVENOUS CONTINUOUS
Status: DISCONTINUED | OUTPATIENT
Start: 2021-04-06 | End: 2021-04-06 | Stop reason: HOSPADM

## 2021-04-06 RX ORDER — FENTANYL CITRATE 50 UG/ML
25 INJECTION, SOLUTION INTRAMUSCULAR; INTRAVENOUS ONCE
Status: COMPLETED | OUTPATIENT
Start: 2021-04-06 | End: 2021-04-06

## 2021-04-06 RX ORDER — CYCLOBENZAPRINE HCL 10 MG
10 TABLET ORAL ONCE
Status: COMPLETED | OUTPATIENT
Start: 2021-04-06 | End: 2021-04-06

## 2021-04-06 RX ORDER — ONDANSETRON 2 MG/ML
4 INJECTION INTRAMUSCULAR; INTRAVENOUS ONCE
Status: COMPLETED | OUTPATIENT
Start: 2021-04-06 | End: 2021-04-06

## 2021-04-06 RX ADMIN — KETOROLAC TROMETHAMINE 30 MG: 15 INJECTION, SOLUTION INTRAMUSCULAR; INTRAVENOUS at 10:08

## 2021-04-06 RX ADMIN — ONDANSETRON 4 MG: 2 INJECTION INTRAMUSCULAR; INTRAVENOUS at 11:03

## 2021-04-06 RX ADMIN — FENTANYL CITRATE 25 MCG: 50 INJECTION, SOLUTION INTRAMUSCULAR; INTRAVENOUS at 11:04

## 2021-04-06 RX ADMIN — CYCLOBENZAPRINE 10 MG: 10 TABLET, FILM COATED ORAL at 13:23

## 2021-04-06 RX ADMIN — SODIUM CHLORIDE 1000 ML: 9 INJECTION, SOLUTION INTRAVENOUS at 10:08

## 2021-04-06 ASSESSMENT — PAIN SCALES - GENERAL: PAINLEVEL_OUTOF10: 8

## 2021-04-06 ASSESSMENT — PAIN DESCRIPTION - ORIENTATION
ORIENTATION: LEFT
ORIENTATION: LEFT

## 2021-04-06 ASSESSMENT — PAIN DESCRIPTION - DESCRIPTORS
DESCRIPTORS: DISCOMFORT
DESCRIPTORS: SHARP

## 2021-04-06 ASSESSMENT — PAIN DESCRIPTION - PAIN TYPE: TYPE: ACUTE PAIN

## 2021-04-06 ASSESSMENT — PAIN DESCRIPTION - LOCATION: LOCATION: FLANK

## 2021-04-06 ASSESSMENT — PAIN DESCRIPTION - FREQUENCY: FREQUENCY: INTERMITTENT

## 2021-04-06 NOTE — ED PROVIDER NOTES
[facility]  EMERGENCY DEPARTMENT ENCOUNTER      Pt Name: Johnson Thompson  MRN: [id number]  [place] 1990  Date of evaluation: 4/6/2021  Provider: Savage Allen MD    [address]       Chief Complaint   Patient presents with    Flank Pain     left sided, onset x 4 days         HISTORY OF PRESENT ILLNESS   (Location/Symptom, Timing/Onset, Context/Setting, Quality, Duration, Modifying Factors, Severity)  Note limiting factors. Johnson Thompson is a 36 y.o. female who presents to the emergency department     33-year-old female presents emergency department with concerns of left flank pain began rather abruptly 4 days prior to ED arrival.  No  symptomatology. No history of trauma. No nausea vomiting. No fever chills. Patient denies any rashes about her left flank. No obvious precipitating or alleviating factors. No previous history for kidney stones. She denies any shortness of breath or chest pain. Nursing Notes were reviewed. REVIEW OF SYSTEMS    (2-9 systems for level 4, 10 or more for level 5)     Review of Systems   Constitutional: Negative. HENT: Negative. Eyes: Negative. Respiratory: Negative. Cardiovascular: Negative. Gastrointestinal: Negative. Endocrine: Negative. Genitourinary: Positive for flank pain. Musculoskeletal: Positive for back pain (Left flank pain). Skin: Negative. Allergic/Immunologic: Negative. Neurological: Negative. Hematological: Negative. Except as noted above the remainder of the review of systems was reviewed and negative.        PAST MEDICAL HISTORY     Past Medical History:   Diagnosis Date    Allergy     seasonal    Anxiety     ASCUS with positive high risk HPV 7/16/2013    Asthma childhood    Bipolar disorder ([address]     B[facility] ([facility] [address]     Heart abnormality     HSIL on Pap smear 8/16/2012    PCOS (polycystic ovarian syndrome)     Postpartum depression     Type AB blood, Rh positive SURGICAL HISTORY       Past Surgical History:   Procedure Laterality Date    DILATION AND CURETTAGE  age 17    miscarriage    DILATION AND CURETTAGE OF UTERUS  10/17/2018    Dr Andra Wang Seen N/A 10/17/2018    DILATATION AND CURETTAGE LEEP- ECC performed by Ankita Butts MD at [address] [place] [address]       Discharge Medication List as of 4/6/2021  1:25 PM      CONTINUE these medications which have NOT CHANGED    Details   buPROPion (WELLBUTRIN XL) 300 MG extended release tablet Take 1 tablet by mouth every morning, Disp-30 tablet, R-3Normal      OXcarbazepine (TRILEPTAL) 300 MG tablet Take 1 tablet by mouth 2 times daily, Disp-60 tablet, R-2Normal             ALLERGIES     Patient has no known allergies. FAMILY HISTORY       Family History   Problem Relation Age of Onset    Diabetes Father         double amputee          SOCIAL HISTORY       Social History     Socioeconomic History    Marital status: Legally      Spouse name: None    Number of children: None    Years of education: None    Highest education level: None   Occupational History     Employer: [employer]   Social Needs    Financial resource strain: None    Food insecurity     Worry: None     Inability: None    Transportation needs     Medical: None     Non-medical: None   Tobacco Use    Smoking status: Former Smoker     Types: Cigarettes    Smokeless tobacco: Never Used   Substance and Sexual Activity    Alcohol use:  Yes     Alcohol/week: 0.0 standard drinks     Comment: socially    Drug use: No    Sexual activity: Yes     Partners: Male   Lifestyle    Physical activity     Days per week: None     Minutes per session: None    Stress: None   Relationships    Social connections     Talks on phone: None     Gets together: None     Attends Congregational service: None     Active member of club or organization: None     Attends meetings of clubs or organizations: None     Relationship status: None    Intimate partner violence     Fear of current or ex partner: None     Emotionally abused: None     Physically abused: None     Forced sexual activity: None   Other Topics Concern    None   Social History Narrative    None       SCREENINGS        Johnnie Coma Scale  Eye Opening: Spontaneous  Best Verbal Response: Oriented  Best Motor Response: Obeys commands  Johnnie Coma Scale Score: 15               PHYSICAL EXAM    (up to 7 for level 4, 8 or more for level 5)     ED Triage Vitals [04/06/21 0932]   BP Temp Temp Source Pulse Resp SpO2 Height Weight   137/87 98.4 °F (36.9 °C) Tympanic 70 14 97 % -- 240 lb (108.9 kg)       Physical Exam  Vitals signs and nursing note reviewed. Exam conducted with a chaperone present. Constitutional:       General: She is not in acute distress. Appearance: Normal appearance. She is not ill-appearing, toxic-appearing or diaphoretic. HENT:      Head: Normocephalic. Nose: Nose normal.      Mouth/Throat:      Mouth: Mucous membranes are moist.   Eyes:      Extraocular Movements: Extraocular movements intact. Neck:      Musculoskeletal: Normal range of motion. Cardiovascular:      Rate and Rhythm: Normal rate and regular rhythm. Pulses: Normal pulses. Heart sounds: Normal heart sounds. Pulmonary:      Effort: Pulmonary effort is normal.      Breath sounds: Normal breath sounds. Abdominal:      General: Abdomen is flat. There is no distension. Palpations: There is no mass. Tenderness: There is no abdominal tenderness. There is left CVA tenderness. There is no right CVA tenderness. Musculoskeletal: Normal range of motion. Skin:     General: Skin is warm. Capillary Refill: Capillary refill takes less than 2 seconds. Findings: No rash. Neurological:      General: No focal deficit present. Mental Status: She is alert and oriented to person, place, and time. Motor: No weakness. DIAGNOSTIC RESULTS     EKG: All EKG's are interpreted by the Emergency Department Physician who either signs or Co-signs this chart in the absence of a cardiologist.      RADIOLOGY:   Non-plain film images such as CT, Ultrasound and MRI are read by the radiologist. Plain radiographic images are visualized and preliminarily interpreted by the emergency physician with the below findings:      Interpretation per the Radiologist below, if available at the time of this note:    CT ABDOMEN PELVIS WO CONTRAST Additional Contrast? None   Final Result   *Borderline to mild hepatomegaly with diffuse severe fatty infiltration. *Otherwise negative noncontrast CT examination of the abdomen and pelvis with   no evidence of nephrolithiasis, obstructive uropathy or other acute process. ED BEDSIDE ULTRASOUND:   Performed by ED Physician - none    LABS:  Labs Reviewed   CBC WITH AUTO DIFFERENTIAL - Abnormal; Notable for the following components:       Result Value    Hemoglobin 15.3 (*)     All other components within normal limits   COMPREHENSIVE METABOLIC PANEL W/ REFLEX TO MG FOR LOW K - Abnormal; Notable for the following components:    Glucose 258 (*)      (*)     AST 78 (*)     All other components within normal limits   URINALYSIS - Abnormal; Notable for the following components:    Glucose, Ur 1+ (*)     Urine Hgb 1+ (*)     Protein, UA TRACE (*)     Leukocyte Esterase, Urine TRACE (*)     All other components within normal limits   MICROSCOPIC URINALYSIS - Abnormal; Notable for the following components:    Bacteria, UA 2+ (*)     Mucus, UA 1+ (*)     All other components within normal limits   PREGNANCY, URINE   D-DIMER, QUANTITATIVE       All other labs were within normal range or not returned as of this dictation.     EMERGENCY DEPARTMENT COURSE and DIFFERENTIAL DIAGNOSIS/MDM:   Vitals:    Vitals:    04/06/21 0948 04/06/21 1000 04/06/21 1026 04/06/21 1131   BP: 121/70 119/61 125/62 (!) 113/56 Pulse:       Resp:       Temp:       TempSrc:       SpO2: 96% 98% 96% 96%   Weight:             MDM  Number of Diagnoses or Management Options  Flank pain  Diagnosis management comments: 82-year-old female who presented to the emergency department with concerns of gradual onset left flank pain  Diagnosis considered ureterolithiasis, urinary tract infection, radiculopathy, musculoskeletal discomfort    Laboratory studies imaging studies have been reviewed with the patient and the importance of follow-up and recheck of her blood sugar    No red flags patient discharged stable and improved condition medications as provided advised follow-up as documented        REASSESSMENT   Less discomfort after receiving analgesia's which included Toradol, fentanyl-patient remained hemodynamically stable nontoxic-appearing during emergency department course    ED Course as of Apr 07 1333   Tue Apr 06, 2021   1015 Urinalysis, reflex to microscopic(!):    Color, UA YELLOW   Turbidity UA CLEAR   Glucose, UA 1+(!)   Bilirubin, Urine NEGATIVE   Ketones, Urine NEGATIVE   Specific Gravity, UA 1.020   Urine Hgb 1+(!)   pH, UA 5.5   Protein, UA TRACE(!)   Urobilinogen, Urine Normal   Nitrite, Urine NEGATIVE   Leukocyte Esterase, Urine TRACE(!)   Urinalysis Comments NOT REPORTED [RS]   1015 Microscopic Urinalysis(!):    -        WBC, UA 2 TO 5   RBC, UA 0 TO 2   Casts UA NOT REPORTED   Crystals, UA NOT REPORTED   Epithelial Cells, UA 10 TO 20   Renal Epithelial, UA NOT REPORTED   Bacteria, UA 2+(!)   Mucus, UA 1+(!)   Trichomonas, UA NOT REPORTED   Amorphous, UA NOT REPORTED   Other Observations UA NOT REPORTED   Yeast, Urine NOT REPORTED [RS]   1015 CBC Auto Differential(!):    WBC 7.3   RBC 4.83   Hemoglobin Quant 15.3(!)   Hematocrit 44.1   MCV 91.3   MCH 31.7   MCHC 34.7   RDW 12.3   Platelet Count 134   MPV 9.3   NRBC Automated 0.0   Differential Type NOT REPORTED   Seg Neutrophils 52   Lymphocytes 36   Monocytes 8   Eosinophils % 3 Basophils 1   Immature Granulocytes 0   Segs Absolute 3.85   Absolute Lymph # 2.61   Absolute Mono # 0.58   Absolute Eos # 0.19   Basophils Absolute 0.04   Absolute Immature Granulocyte <0.03   WBC Morphology NOT REPORTED   . .. [RS]   1015 Comprehensive Metabolic Panel w/ Reflex to MG(!):    Glucose 258(!)   BUN 16   Creatinine 0.84   Bun/Cre Ratio 19   Calcium 10.3   Sodium 138   Potassium 4.2   Chloride 102   CO2 26   Anion Gap 10   Alk Phos 67   (!)   AST 78(!)   Bilirubin 0.34   Total Protein 7.4   Albumin 4.5   Albumin/Globulin Ratio 1.6   GFR Non-African American >60   GFR  >60   GFR Comment        GFR Staging      [RS]   Wed Apr 07, 2021   1328 CBC Auto Differential(!):    WBC 7.3   RBC 4.83   Hemoglobin Quant 15.3(!)   Hematocrit 44.1   MCV 91.3   MCH 31.7   MCHC 34.7   RDW 12.3   Platelet Count 862   MPV 9.3   NRBC Automated 0.0   Differential Type NOT REPORTED   Seg Neutrophils 52   Lymphocytes 36   Monocytes 8   Eosinophils % 3   Basophils 1   Immature Granulocytes 0   Segs Absolute 3.85   Absolute Lymph # 2.61   Absolute Mono # 0.58   Absolute Eos # 0.19   Basophils Absolute 0.04   Absolute Immature Granulocyte <0.03   WBC Morphology NOT REPORTED   . .. [RS]   3796 D-Dimer, Quantitative:    D-Dimer, Quant <0.27 [RS]   0147 CT abdomen pelvis no acute process radiologist read   CT ABDOMEN PELVIS WO CONTRAST Additional Contrast? None [RS]      ED Course User Index  [RS] Susanne Love MD         CRITICAL CARE TIME   Total Critical Care time was minutes, excluding separately reportable procedures. There was a high probability of clinically significant/life threatening deterioration in the patient's condition which required my urgent intervention. CONSULTS:  None    PROCEDURES:  Unless otherwise noted below, none     Procedures    FINAL IMPRESSION      1.  Flank pain          DISPOSITION/PLAN   DISPOSITION Decision To Discharge 04/06/2021 01:05:40 PM      PATIENT REFERRED

## 2021-04-07 ASSESSMENT — ENCOUNTER SYMPTOMS
EYES NEGATIVE: 1
GASTROINTESTINAL NEGATIVE: 1
RESPIRATORY NEGATIVE: 1
BACK PAIN: 1
ALLERGIC/IMMUNOLOGIC NEGATIVE: 1

## 2021-06-25 ENCOUNTER — HOSPITAL ENCOUNTER (EMERGENCY)
Age: 31
Discharge: HOME | End: 2021-06-25
Attending: EMERGENCY MEDICINE
Payer: MEDICARE

## 2021-06-25 ENCOUNTER — APPOINTMENT (OUTPATIENT)
Dept: GENERAL RADIOLOGY | Age: 31
End: 2021-06-25
Payer: MEDICARE

## 2021-06-25 ENCOUNTER — APPOINTMENT (OUTPATIENT)
Dept: CT IMAGING | Age: 31
End: 2021-06-25
Payer: MEDICARE

## 2021-06-25 VITALS
WEIGHT: 260 LBS | HEART RATE: 99 BPM | RESPIRATION RATE: 18 BRPM | DIASTOLIC BLOOD PRESSURE: 58 MMHG | HEIGHT: 65 IN | OXYGEN SATURATION: 99 % | SYSTOLIC BLOOD PRESSURE: 110 MMHG | TEMPERATURE: 99.1 F | BODY MASS INDEX: 43.32 KG/M2

## 2021-06-25 DIAGNOSIS — R50.9 FEVER, UNSPECIFIED FEVER CAUSE: ICD-10-CM

## 2021-06-25 DIAGNOSIS — B34.9 VIRAL ILLNESS: Primary | ICD-10-CM

## 2021-06-25 LAB
-: ABNORMAL
ABSOLUTE EOS #: 0.04 K/UL (ref 0–0.44)
ABSOLUTE IMMATURE GRANULOCYTE: <0.03 K/UL (ref 0–0.3)
ABSOLUTE LYMPH #: 0.74 K/UL (ref 1.1–3.7)
ABSOLUTE MONO #: 0.29 K/UL (ref 0.1–1.2)
AMORPHOUS: ABNORMAL
ANION GAP SERPL CALCULATED.3IONS-SCNC: 14 MMOL/L (ref 9–17)
BACTERIA: ABNORMAL
BASOPHILS # BLD: 0 % (ref 0–2)
BASOPHILS ABSOLUTE: <0.03 K/UL (ref 0–0.2)
BILIRUBIN URINE: NEGATIVE
BUN BLDV-MCNC: 16 MG/DL (ref 6–20)
BUN/CREAT BLD: 26 (ref 9–20)
CALCIUM SERPL-MCNC: 9.1 MG/DL (ref 8.6–10.4)
CASTS UA: ABNORMAL /LPF
CHLORIDE BLD-SCNC: 99 MMOL/L (ref 98–107)
CO2: 22 MMOL/L (ref 20–31)
COLOR: YELLOW
COMMENT UA: ABNORMAL
CREAT SERPL-MCNC: 0.62 MG/DL (ref 0.5–0.9)
CRYSTALS, UA: ABNORMAL /HPF
DIFFERENTIAL TYPE: ABNORMAL
DIRECT EXAM: NORMAL
EOSINOPHILS RELATIVE PERCENT: 1 % (ref 1–4)
EPITHELIAL CELLS UA: ABNORMAL /HPF (ref 0–25)
GFR AFRICAN AMERICAN: >60 ML/MIN
GFR NON-AFRICAN AMERICAN: >60 ML/MIN
GFR SERPL CREATININE-BSD FRML MDRD: ABNORMAL ML/MIN/{1.73_M2}
GFR SERPL CREATININE-BSD FRML MDRD: ABNORMAL ML/MIN/{1.73_M2}
GLUCOSE BLD-MCNC: 239 MG/DL (ref 70–99)
GLUCOSE URINE: ABNORMAL
HCG QUALITATIVE: NEGATIVE
HCT VFR BLD CALC: 44.8 % (ref 36.3–47.1)
HEMOGLOBIN: 15.2 G/DL (ref 11.9–15.1)
IMMATURE GRANULOCYTES: 0 %
KETONES, URINE: NEGATIVE
LACTIC ACID, SEPSIS WHOLE BLOOD: ABNORMAL MMOL/L (ref 0.5–1.9)
LACTIC ACID, SEPSIS WHOLE BLOOD: NORMAL MMOL/L (ref 0.5–1.9)
LACTIC ACID, SEPSIS: 1.5 MMOL/L (ref 0.5–1.9)
LACTIC ACID, SEPSIS: 2.1 MMOL/L (ref 0.5–1.9)
LEUKOCYTE ESTERASE, URINE: NEGATIVE
LIPASE: 35 U/L (ref 13–60)
LYMPHOCYTES # BLD: 16 % (ref 24–43)
Lab: NORMAL
MCH RBC QN AUTO: 31.5 PG (ref 25.2–33.5)
MCHC RBC AUTO-ENTMCNC: 33.9 G/DL (ref 28.4–34.8)
MCV RBC AUTO: 92.8 FL (ref 82.6–102.9)
MONOCYTES # BLD: 6 % (ref 3–12)
MUCUS: ABNORMAL
NITRITE, URINE: NEGATIVE
NRBC AUTOMATED: 0 PER 100 WBC
OTHER OBSERVATIONS UA: ABNORMAL
PDW BLD-RTO: 13.2 % (ref 11.8–14.4)
PH UA: 6.5 (ref 5–9)
PLATELET # BLD: 197 K/UL (ref 138–453)
PLATELET ESTIMATE: ABNORMAL
PMV BLD AUTO: 9.5 FL (ref 8.1–13.5)
POTASSIUM SERPL-SCNC: 3.9 MMOL/L (ref 3.7–5.3)
PROTEIN UA: NEGATIVE
RBC # BLD: 4.83 M/UL (ref 3.95–5.11)
RBC # BLD: ABNORMAL 10*6/UL
RBC UA: ABNORMAL /HPF (ref 0–2)
RENAL EPITHELIAL, UA: ABNORMAL /HPF
SARS-COV-2, RAPID: NOT DETECTED
SEG NEUTROPHILS: 77 % (ref 36–65)
SEGMENTED NEUTROPHILS ABSOLUTE COUNT: 3.47 K/UL (ref 1.5–8.1)
SODIUM BLD-SCNC: 135 MMOL/L (ref 135–144)
SPECIFIC GRAVITY UA: 1.02 (ref 1.01–1.02)
SPECIMEN DESCRIPTION: NORMAL
SPECIMEN DESCRIPTION: NORMAL
TRICHOMONAS: ABNORMAL
TURBIDITY: CLEAR
URINE HGB: NEGATIVE
UROBILINOGEN, URINE: NORMAL
WBC # BLD: 4.6 K/UL (ref 3.5–11.3)
WBC # BLD: ABNORMAL 10*3/UL
WBC UA: ABNORMAL /HPF (ref 0–5)
YEAST: ABNORMAL

## 2021-06-25 PROCEDURE — 2580000003 HC RX 258: Performed by: PHYSICIAN ASSISTANT

## 2021-06-25 PROCEDURE — 96375 TX/PRO/DX INJ NEW DRUG ADDON: CPT

## 2021-06-25 PROCEDURE — C9803 HOPD COVID-19 SPEC COLLECT: HCPCS

## 2021-06-25 PROCEDURE — 6370000000 HC RX 637 (ALT 250 FOR IP): Performed by: PHYSICIAN ASSISTANT

## 2021-06-25 PROCEDURE — 81001 URINALYSIS AUTO W/SCOPE: CPT

## 2021-06-25 PROCEDURE — 84703 CHORIONIC GONADOTROPIN ASSAY: CPT

## 2021-06-25 PROCEDURE — 83605 ASSAY OF LACTIC ACID: CPT

## 2021-06-25 PROCEDURE — 70450 CT HEAD/BRAIN W/O DYE: CPT

## 2021-06-25 PROCEDURE — 87880 STREP A ASSAY W/OPTIC: CPT

## 2021-06-25 PROCEDURE — 87635 SARS-COV-2 COVID-19 AMP PRB: CPT

## 2021-06-25 PROCEDURE — 96374 THER/PROPH/DIAG INJ IV PUSH: CPT

## 2021-06-25 PROCEDURE — 6360000002 HC RX W HCPCS: Performed by: PHYSICIAN ASSISTANT

## 2021-06-25 PROCEDURE — 83690 ASSAY OF LIPASE: CPT

## 2021-06-25 PROCEDURE — 99284 EMERGENCY DEPT VISIT MOD MDM: CPT

## 2021-06-25 PROCEDURE — 71045 X-RAY EXAM CHEST 1 VIEW: CPT

## 2021-06-25 PROCEDURE — 80048 BASIC METABOLIC PNL TOTAL CA: CPT

## 2021-06-25 PROCEDURE — 85025 COMPLETE CBC W/AUTO DIFF WBC: CPT

## 2021-06-25 PROCEDURE — 36415 COLL VENOUS BLD VENIPUNCTURE: CPT

## 2021-06-25 RX ORDER — 0.9 % SODIUM CHLORIDE 0.9 %
1000 INTRAVENOUS SOLUTION INTRAVENOUS ONCE
Status: COMPLETED | OUTPATIENT
Start: 2021-06-25 | End: 2021-06-25

## 2021-06-25 RX ORDER — IBUPROFEN 600 MG/1
600 TABLET ORAL ONCE
Status: COMPLETED | OUTPATIENT
Start: 2021-06-25 | End: 2021-06-25

## 2021-06-25 RX ORDER — ACETAMINOPHEN 325 MG/1
650 TABLET ORAL ONCE
Status: COMPLETED | OUTPATIENT
Start: 2021-06-25 | End: 2021-06-25

## 2021-06-25 RX ORDER — ONDANSETRON 2 MG/ML
4 INJECTION INTRAMUSCULAR; INTRAVENOUS ONCE
Status: COMPLETED | OUTPATIENT
Start: 2021-06-25 | End: 2021-06-25

## 2021-06-25 RX ORDER — KETOROLAC TROMETHAMINE 15 MG/ML
30 INJECTION, SOLUTION INTRAMUSCULAR; INTRAVENOUS ONCE
Status: COMPLETED | OUTPATIENT
Start: 2021-06-25 | End: 2021-06-25

## 2021-06-25 RX ADMIN — ACETAMINOPHEN 650 MG: 325 TABLET ORAL at 13:48

## 2021-06-25 RX ADMIN — SODIUM CHLORIDE 1000 ML: 9 INJECTION, SOLUTION INTRAVENOUS at 14:50

## 2021-06-25 RX ADMIN — ONDANSETRON 4 MG: 2 INJECTION INTRAMUSCULAR; INTRAVENOUS at 13:48

## 2021-06-25 RX ADMIN — KETOROLAC TROMETHAMINE 30 MG: 15 INJECTION, SOLUTION INTRAMUSCULAR; INTRAVENOUS at 17:03

## 2021-06-25 RX ADMIN — IBUPROFEN 600 MG: 600 TABLET, FILM COATED ORAL at 13:48

## 2021-06-25 RX ADMIN — SODIUM CHLORIDE 1000 ML: 9 INJECTION, SOLUTION INTRAVENOUS at 13:48

## 2021-06-25 ASSESSMENT — PAIN DESCRIPTION - LOCATION
LOCATION: GENERALIZED
LOCATION: HEAD

## 2021-06-25 ASSESSMENT — ENCOUNTER SYMPTOMS
RHINORRHEA: 1
COUGH: 1
SINUS PAIN: 1

## 2021-06-25 ASSESSMENT — PAIN DESCRIPTION - PAIN TYPE: TYPE: ACUTE PAIN

## 2021-06-25 ASSESSMENT — PAIN SCALES - GENERAL
PAINLEVEL_OUTOF10: 6
PAINLEVEL_OUTOF10: 8
PAINLEVEL_OUTOF10: 8

## 2021-06-25 ASSESSMENT — PAIN DESCRIPTION - DESCRIPTORS: DESCRIPTORS: ACHING

## 2021-06-25 NOTE — ED PROVIDER NOTES
[facility] ED  ED Attending Physician Attestation     I performed a history and physical examination of the patient and discussed management with the Resident. I reviewed the Resident's note and agree with the documented findings and plan of care. Any areas of disagreement are noted on the chart. I was personally present for the key portions of any procedures and have documented in the chart those procedures where I was not present during the key portions. I have reviewed the emergency nurses triage note and agree with the chief complaint, past medical history, past surgical history, allergies, medications, social and family history as documented unless otherwise noted below. For Advanced Practice Clinician cases, I have personally evaluated this patient and have completed at least one key elements of the E/M. Additional findings and any areas of disagreement are as noted. History     36 y.o. female with a generalized gradual onset headache as well as a fever today, some myalgias and overall not feeling well past few days. All started with sinus pressure 3 days ago, yesterday felt better, but today symptoms worsened. Denies any stiff or painful neck, no history of meningitis or encephalitis. No known exposures, although the patient's son is also had a fever last couple days as well. Patient has had a mild cough which has been nonproductive. Denies any shortness of breath.     Vitals:    Vitals:    06/25/21 1207 06/25/21 1447 06/25/21 1448 06/25/21 1658   BP: 114/79  (!) 110/58    Pulse: 108  99    Resp: 18      Temp: 101.2 °F (38.4 °C) 100.1 °F (37.8 °C)  99.1 °F (37.3 °C)   TempSrc: Tympanic Tympanic  Tympanic   SpO2: 96%  99%    Weight: 260 lb (117.9 kg)      Height: 5' 5\" (1.651 m)          Known Problems:    Patient Active Problem List   Diagnosis    Lower abdominal pain    Headache    HGSIL (high grade squamous intraepithelial lesion) on Pap smear of cervix     Physical Exam     Gen: Non-toxic, well appearing  Head:   AT/NC               EOMI, GRAHAM               Oropharynx Clear, mucous membranes moist  Neck:    Supple, no meningismus; No LAD  Chest:  CTAB    HRRR no mcg  Abd:      SNT/ND; No rebound/guarding/rigidity. Neg McBurneys  Extrem: No edema, neg homans. Neuro:   Alert, Awake, no lateralizing deficts               CN's grossly intact bilaterally    DIAGNOSTIC RESULTS   RADIOLOGY:   CT Head WO Contrast   Final Result   No evidence of acute intracranial process. XR CHEST PORTABLE   Final Result   Negative chest.             LABS:  Labs Reviewed   CBC WITH AUTO DIFFERENTIAL - Abnormal; Notable for the following components:       Result Value    Hemoglobin 15.2 (*)     Seg Neutrophils 77 (*)     Lymphocytes 16 (*)     Absolute Lymph # 0.74 (*)     All other components within normal limits   BASIC METABOLIC PANEL W/ REFLEX TO MG FOR LOW K - Abnormal; Notable for the following components:    Glucose 239 (*)     Bun/Cre Ratio 26 (*)     All other components within normal limits   URINE RT REFLEX TO CULTURE - Abnormal; Notable for the following components:    Glucose, Ur 1+ (*)     All other components within normal limits   LACTATE, SEPSIS - Abnormal; Notable for the following components:    Lactic Acid, Sepsis 2.1 (*)     All other components within normal limits   MICROSCOPIC URINALYSIS - Abnormal; Notable for the following components:    Bacteria, UA 2+ (*)     All other components within normal limits   COVID-19, RAPID   STREP SCREEN GROUP A THROAT   LIPASE   HCG, SERUM, QUALITATIVE   LACTATE, SEPSIS       EMERGENCY DEPARTMENT COURSE:        Medical Decision-Making:   Nontoxic well-appearing 81-year-old female. Clinically there is no evidence of meningitis or encephalitis.   Source of the fever somewhat unclear, suspect a viral illness but I do feel the patient is appropriate for discharge and outpatient management      The patient was evaluated during the COVID-19 pandemic and

## 2021-06-25 NOTE — ED PROVIDER NOTES
[facility] ED  eMERGENCY dEPARTMENT eNCOUnter      Pt Name: Nicki Modi  MRN: [id number]  [place] 1990  Date of evaluation: 6/25/21      CHIEF COMPLAINT       Chief Complaint   Patient presents with    Fever     onset this AM    Emesis     onset this AM         HISTORY OF PRESENT ILLNESS    Nicki Modi is a 36 y.o. female who presents complaining of fever chills emesis x1 started about 4 days ago. The history is provided by the patient. URI  Presenting symptoms: congestion, cough, fever and rhinorrhea    Severity:  Moderate  Onset quality:  Gradual  Duration:  4 days  Timing:  Intermittent  Progression:  Waxing and waning  Chronicity:  New  Relieved by:  Nothing  Worsened by:  Nothing  Ineffective treatments:  None tried  Associated symptoms: headaches and sinus pain        REVIEW OF SYSTEMS       Review of Systems   Constitutional: Positive for fever. HENT: Positive for congestion, rhinorrhea and sinus pain. Respiratory: Positive for cough. Neurological: Positive for headaches. All other systems reviewed and are negative.       PAST MEDICAL HISTORY     Past Medical History:   Diagnosis Date    Allergy     seasonal    Anxiety     ASCUS with positive high risk HPV 7/16/2013    Asthma childhood    Bipolar disorder ([address]     Bipolar disorder [facility] [address]     Heart abnormality     HSIL on Pap smear 8/16/2012    PCOS (polycystic ovarian syndrome)     Postpartum depression     Type AB blood, Rh positive        SURGICAL HISTORY       Past Surgical History:   Procedure Laterality Date    DILATION AND CURETTAGE  age 17    miscarriage    DILATION AND CURETTAGE OF UTERUS  10/17/2018    Dr Andra Wang Seen N/A 10/17/2018    DILATATION AND CURETTAGE LEEP- ECC performed by Ankita Butts MD at [facility]       Previous Medications    BUPROPION (WELLBUTRIN XL) 300 MG EXTENDED RELEASE TABLET    Take 1 tablet by mouth every morning    IBUPROFEN (ADVIL;MOTRIN) 600 MG TABLET    Take 1 tablet by mouth 3 times daily as needed for Pain    OXCARBAZEPINE (TRILEPTAL) 300 MG TABLET    Take 1 tablet by mouth 2 times daily       ALLERGIES     has No Known Allergies. FAMILY HISTORY     She indicated that her mother is alive. She indicated that her father is alive. She indicated that her sister is alive. She indicated that her brother is alive. She indicated that her maternal grandmother is . She indicated that her maternal grandfather is . She indicated that her paternal grandmother is . She indicated that her paternal grandfather is . She indicated that her other is alive. SOCIAL HISTORY      reports that she has quit smoking. Her smoking use included cigarettes. She has never used smokeless tobacco. She reports current alcohol use. She reports current drug use. Drug: Marijuana. PHYSICAL EXAM     INITIAL VITALS: BP (!) 110/58   Pulse 99   Temp 99.1 °F (37.3 °C) (Tympanic)   Resp 18   Ht 5' 5\" (1.651 m)   Wt 260 lb (117.9 kg)   LMP 2021   SpO2 99%   BMI 43.27 kg/m²      Physical Exam  Vitals and nursing note reviewed. Constitutional:       Appearance: She is well-developed. HENT:      Head: Normocephalic and atraumatic. Right Ear: Tympanic membrane normal.      Left Ear: Tympanic membrane normal.      Mouth/Throat:      Mouth: Mucous membranes are moist.      Pharynx: Posterior oropharyngeal erythema present. No oropharyngeal exudate. Eyes:      Pupils: Pupils are equal, round, and reactive to light. Cardiovascular:      Rate and Rhythm: Normal rate and regular rhythm. Heart sounds: Normal heart sounds. No murmur heard. Pulmonary:      Effort: Pulmonary effort is normal.      Breath sounds: Normal breath sounds. Abdominal:      General: Bowel sounds are normal.      Palpations: Abdomen is soft. Tenderness: There is no abdominal tenderness.    Musculoskeletal: General: Normal range of motion. Cervical back: Normal range of motion. Skin:     General: Skin is warm and dry. Capillary Refill: Capillary refill takes less than 2 seconds. Neurological:      Mental Status: She is alert and oriented to person, place, and time. MEDICAL DECISION MAKING:     Fever started this morning she had 1 bout of emesis upon arrival she had a fever 101.2. On the emergency department she was given IV fluids she is given antipyretics and antiemetics she was given Toradol for headache. She has a headache upper respiratory congestion sinusitis-like symptoms and emesis x1. Patient improved after fluids and medications. A CT of the head was done due to the headache we did labs and a chest x-ray no pneumonia no acute findings negative Covid negative strep. At this time unsure of source of fever likely viral in origin. Her temperature is now 99.1, blood pressure 110/58, heart rate 99 respirations are 18 unlabored her O2 sats 99% on room air. This time feels safe for the patient to be discharged home recommend Tylenol Motrin for pain or fever increase fluids. Recommend close follow-up with primary care in 1 to 2 days for recheck. If symptoms worsen or return to recommend the patient come back to the emergency department. She verbalized understanding of discharge instructions she is agreeable with this plan. DIAGNOSTIC RESULTS     EKG: All EKG's are interpreted by the Emergency Department Physician who either signs or Co-signs this chart in the absence of acardiologist.        RADIOLOGY:Allplain film, CT, MRI, and formal ultrasound images (except ED bedside ultrasound) are read by the radiologist and the images and interpretations are directly viewed by the emergency physician.        FINDINGS:   Stable mild elevation hemidiaphragm.  The lungs are without acute focal   process.  No effusion or pneumothorax.  Stable right upper lobe calcified   granuloma.  The cardiomediastinal silhouette is normal.  The osseous   structures are intact without acute process.           Impression   Negative chest.         LABS:All lab results were reviewed by myself, and all abnormals are listed below.   Labs Reviewed   CBC WITH AUTO DIFFERENTIAL - Abnormal; Notable for the following components:       Result Value    Hemoglobin 15.2 (*)     Seg Neutrophils 77 (*)     Lymphocytes 16 (*)     Absolute Lymph # 0.74 (*)     All other components within normal limits   BASIC METABOLIC PANEL W/ REFLEX TO MG FOR LOW K - Abnormal; Notable for the following components:    Glucose 239 (*)     Bun/Cre Ratio 26 (*)     All other components within normal limits   URINE RT REFLEX TO CULTURE - Abnormal; Notable for the following components:    Glucose, Ur 1+ (*)     All other components within normal limits   LACTATE, SEPSIS - Abnormal; Notable for the following components:    Lactic Acid, Sepsis 2.1 (*)     All other components within normal limits   MICROSCOPIC URINALYSIS - Abnormal; Notable for the following components:    Bacteria, UA 2+ (*)     All other components within normal limits   COVID-19, RAPID   STREP SCREEN GROUP A THROAT   LIPASE   HCG, SERUM, QUALITATIVE   LACTATE, SEPSIS         EMERGENCY DEPARTMENT COURSE:   Vitals:    Vitals:    06/25/21 1207 06/25/21 1447 06/25/21 1448 06/25/21 1658   BP: 114/79  (!) 110/58    Pulse: 108  99    Resp: 18      Temp: 101.2 °F (38.4 °C) 100.1 °F (37.8 °C)  99.1 °F (37.3 °C)   TempSrc: Tympanic Tympanic  Tympanic   SpO2: 96%  99%    Weight: 260 lb (117.9 kg)      Height: 5' 5\" (1.651 m)          The patient was given the following medications while in the emergency department:  Orders Placed This Encounter   Medications    0.9 % sodium chloride bolus    ibuprofen (ADVIL;MOTRIN) tablet 600 mg    acetaminophen (TYLENOL) tablet 650 mg    ondansetron (ZOFRAN) injection 4 mg    0.9 % sodium chloride bolus    ketorolac (TORADOL) injection 30 mg -------------------------      CRITICAL CARE:     CONSULTS:  None    PROCEDURES:  Procedures    FINAL IMPRESSION      1. Viral illness Stable   2.  Fever, unspecified fever cause Stable         DISPOSITION/PLAN   DISPOSITION        PATIENT REFERREDTO:  MD Millicent Randall [address] [facility]  [phone]    Schedule an appointment as soon as possible for a visit in 2 days      [facility] ED  [address] [facility]  [phone]    If symptoms worsen      DISCHARGEMEDICATIONS:  New Prescriptions    No medications on file       (Please note that portions of this note were completed with a voice recognition program.  Efforts were made to edit thedictations but occasionally words are mis-transcribed.)    EVERETT Ortiz PA-C  06/25/21 1570

## 2021-09-20 ENCOUNTER — HOSPITAL ENCOUNTER (OUTPATIENT)
Age: 31
Setting detail: OBSERVATION
Discharge: HOME | End: 2021-09-22
Attending: EMERGENCY MEDICINE | Admitting: INTERNAL MEDICINE
Payer: MEDICARE

## 2021-09-20 ENCOUNTER — APPOINTMENT (OUTPATIENT)
Dept: NON INVASIVE DIAGNOSTICS | Age: 31
End: 2021-09-20
Payer: MEDICARE

## 2021-09-20 ENCOUNTER — APPOINTMENT (OUTPATIENT)
Dept: CT IMAGING | Age: 31
End: 2021-09-20
Payer: MEDICARE

## 2021-09-20 DIAGNOSIS — R10.9 ABDOMINAL PAIN, UNSPECIFIED ABDOMINAL LOCATION: Primary | ICD-10-CM

## 2021-09-20 DIAGNOSIS — E11.9 NEW ONSET TYPE 2 DIABETES MELLITUS (HCC): ICD-10-CM

## 2021-09-20 DIAGNOSIS — R00.1 BRADYCARDIA: ICD-10-CM

## 2021-09-20 LAB
-: ABNORMAL
ABSOLUTE EOS #: <0.03 K/UL (ref 0–0.44)
ABSOLUTE IMMATURE GRANULOCYTE: 0.09 K/UL (ref 0–0.3)
ABSOLUTE LYMPH #: 3.35 K/UL (ref 1.1–3.7)
ABSOLUTE MONO #: 0.68 K/UL (ref 0.1–1.2)
ALBUMIN SERPL-MCNC: 4.7 G/DL (ref 3.5–5.2)
ALBUMIN/GLOBULIN RATIO: 1.6 (ref 1–2.5)
ALP BLD-CCNC: 74 U/L (ref 35–104)
ALT SERPL-CCNC: 166 U/L (ref 5–33)
AMORPHOUS: ABNORMAL
ANION GAP SERPL CALCULATED.3IONS-SCNC: 15 MMOL/L (ref 9–17)
AST SERPL-CCNC: 78 U/L
BACTERIA: ABNORMAL
BASOPHILS # BLD: 0 % (ref 0–2)
BASOPHILS ABSOLUTE: 0.04 K/UL (ref 0–0.2)
BILIRUB SERPL-MCNC: 0.36 MG/DL (ref 0.3–1.2)
BILIRUBIN URINE: NEGATIVE
BUN BLDV-MCNC: 18 MG/DL (ref 6–20)
BUN/CREAT BLD: 29 (ref 9–20)
CALCIUM SERPL-MCNC: 9.5 MG/DL (ref 8.6–10.4)
CASTS UA: ABNORMAL /LPF
CHLORIDE BLD-SCNC: 101 MMOL/L (ref 98–107)
CHOLESTEROL/HDL RATIO: 6.8
CHOLESTEROL: 259 MG/DL
CO2: 23 MMOL/L (ref 20–31)
COLOR: YELLOW
COMMENT UA: ABNORMAL
CREAT SERPL-MCNC: 0.62 MG/DL (ref 0.5–0.9)
CRYSTALS, UA: ABNORMAL /HPF
DIFFERENTIAL TYPE: ABNORMAL
EKG ATRIAL RATE: 47 BPM
EKG P AXIS: 44 DEGREES
EKG P-R INTERVAL: 148 MS
EKG Q-T INTERVAL: 486 MS
EKG QRS DURATION: 96 MS
EKG QTC CALCULATION (BAZETT): 430 MS
EKG R AXIS: 32 DEGREES
EKG T AXIS: 16 DEGREES
EKG VENTRICULAR RATE: 47 BPM
EOSINOPHILS RELATIVE PERCENT: 0 % (ref 1–4)
EPITHELIAL CELLS UA: ABNORMAL /HPF (ref 0–25)
GFR AFRICAN AMERICAN: >60 ML/MIN
GFR NON-AFRICAN AMERICAN: >60 ML/MIN
GFR SERPL CREATININE-BSD FRML MDRD: ABNORMAL ML/MIN/{1.73_M2}
GFR SERPL CREATININE-BSD FRML MDRD: ABNORMAL ML/MIN/{1.73_M2}
GLUCOSE BLD-MCNC: 235 MG/DL (ref 74–100)
GLUCOSE BLD-MCNC: 285 MG/DL (ref 70–99)
GLUCOSE URINE: ABNORMAL
HCG(URINE) PREGNANCY TEST: NEGATIVE
HCT VFR BLD CALC: 43.9 % (ref 36.3–47.1)
HDLC SERPL-MCNC: 38 MG/DL
HEMOGLOBIN: 14.9 G/DL (ref 11.9–15.1)
IMMATURE GRANULOCYTES: 1 %
KETONES, URINE: ABNORMAL
LACTIC ACID, WHOLE BLOOD: NORMAL MMOL/L (ref 0.7–2.1)
LACTIC ACID: 1.6 MMOL/L (ref 0.5–2.2)
LDL CHOLESTEROL: 142 MG/DL (ref 0–130)
LEUKOCYTE ESTERASE, URINE: NEGATIVE
LIPASE: 36 U/L (ref 13–60)
LV EF: 65 %
LVEF MODALITY: NORMAL
LYMPHOCYTES # BLD: 31 % (ref 24–43)
MCH RBC QN AUTO: 31.2 PG (ref 25.2–33.5)
MCHC RBC AUTO-ENTMCNC: 33.9 G/DL (ref 28.4–34.8)
MCV RBC AUTO: 91.8 FL (ref 82.6–102.9)
MONOCYTES # BLD: 6 % (ref 3–12)
MUCUS: ABNORMAL
NITRITE, URINE: NEGATIVE
NRBC AUTOMATED: 0 PER 100 WBC
OTHER OBSERVATIONS UA: ABNORMAL
PDW BLD-RTO: 12.3 % (ref 11.8–14.4)
PH UA: 6.5 (ref 5–9)
PLATELET # BLD: 264 K/UL (ref 138–453)
PLATELET ESTIMATE: ABNORMAL
PMV BLD AUTO: 9.5 FL (ref 8.1–13.5)
POTASSIUM SERPL-SCNC: 4.2 MMOL/L (ref 3.7–5.3)
PROTEIN UA: NEGATIVE
RBC # BLD: 4.78 M/UL (ref 3.95–5.11)
RBC # BLD: ABNORMAL 10*6/UL
RBC UA: ABNORMAL /HPF (ref 0–2)
RENAL EPITHELIAL, UA: ABNORMAL /HPF
SARS-COV-2, RAPID: NOT DETECTED
SEG NEUTROPHILS: 62 % (ref 36–65)
SEGMENTED NEUTROPHILS ABSOLUTE COUNT: 6.68 K/UL (ref 1.5–8.1)
SODIUM BLD-SCNC: 139 MMOL/L (ref 135–144)
SPECIFIC GRAVITY UA: 1.02 (ref 1.01–1.02)
SPECIMEN DESCRIPTION: NORMAL
TOTAL PROTEIN: 7.7 G/DL (ref 6.4–8.3)
TRICHOMONAS: ABNORMAL
TRIGL SERPL-MCNC: 394 MG/DL
TROPONIN INTERP: NORMAL
TROPONIN T: NORMAL NG/ML
TROPONIN, HIGH SENSITIVITY: <6 NG/L (ref 0–14)
TURBIDITY: CLEAR
URINE HGB: ABNORMAL
UROBILINOGEN, URINE: NORMAL
VLDLC SERPL CALC-MCNC: ABNORMAL MG/DL (ref 1–30)
WBC # BLD: 10.8 K/UL (ref 3.5–11.3)
WBC # BLD: ABNORMAL 10*3/UL
WBC UA: ABNORMAL /HPF (ref 0–5)
YEAST: ABNORMAL

## 2021-09-20 PROCEDURE — 6360000002 HC RX W HCPCS: Performed by: NURSE PRACTITIONER

## 2021-09-20 PROCEDURE — 2580000003 HC RX 258: Performed by: EMERGENCY MEDICINE

## 2021-09-20 PROCEDURE — 2500000003 HC RX 250 WO HCPCS: Performed by: EMERGENCY MEDICINE

## 2021-09-20 PROCEDURE — 93005 ELECTROCARDIOGRAM TRACING: CPT | Performed by: EMERGENCY MEDICINE

## 2021-09-20 PROCEDURE — 99204 OFFICE O/P NEW MOD 45 MIN: CPT | Performed by: INTERNAL MEDICINE

## 2021-09-20 PROCEDURE — 93005 ELECTROCARDIOGRAM TRACING: CPT | Performed by: INTERNAL MEDICINE

## 2021-09-20 PROCEDURE — 83605 ASSAY OF LACTIC ACID: CPT

## 2021-09-20 PROCEDURE — 83690 ASSAY OF LIPASE: CPT

## 2021-09-20 PROCEDURE — 85025 COMPLETE CBC W/AUTO DIFF WBC: CPT

## 2021-09-20 PROCEDURE — 99285 EMERGENCY DEPT VISIT HI MDM: CPT

## 2021-09-20 PROCEDURE — 84484 ASSAY OF TROPONIN QUANT: CPT

## 2021-09-20 PROCEDURE — 93010 ELECTROCARDIOGRAM REPORT: CPT | Performed by: INTERNAL MEDICINE

## 2021-09-20 PROCEDURE — 6360000002 HC RX W HCPCS: Performed by: EMERGENCY MEDICINE

## 2021-09-20 PROCEDURE — 96374 THER/PROPH/DIAG INJ IV PUSH: CPT

## 2021-09-20 PROCEDURE — 96361 HYDRATE IV INFUSION ADD-ON: CPT

## 2021-09-20 PROCEDURE — G0378 HOSPITAL OBSERVATION PER HR: HCPCS

## 2021-09-20 PROCEDURE — 96376 TX/PRO/DX INJ SAME DRUG ADON: CPT

## 2021-09-20 PROCEDURE — 96372 THER/PROPH/DIAG INJ SC/IM: CPT

## 2021-09-20 PROCEDURE — C9803 HOPD COVID-19 SPEC COLLECT: HCPCS

## 2021-09-20 PROCEDURE — 87635 SARS-COV-2 COVID-19 AMP PRB: CPT

## 2021-09-20 PROCEDURE — 80053 COMPREHEN METABOLIC PANEL: CPT

## 2021-09-20 PROCEDURE — 94761 N-INVAS EAR/PLS OXIMETRY MLT: CPT

## 2021-09-20 PROCEDURE — 96375 TX/PRO/DX INJ NEW DRUG ADDON: CPT

## 2021-09-20 PROCEDURE — 6370000000 HC RX 637 (ALT 250 FOR IP): Performed by: EMERGENCY MEDICINE

## 2021-09-20 PROCEDURE — 6360000004 HC RX CONTRAST MEDICATION: Performed by: EMERGENCY MEDICINE

## 2021-09-20 PROCEDURE — 36415 COLL VENOUS BLD VENIPUNCTURE: CPT

## 2021-09-20 PROCEDURE — 6370000000 HC RX 637 (ALT 250 FOR IP): Performed by: NURSE PRACTITIONER

## 2021-09-20 PROCEDURE — 80061 LIPID PANEL: CPT

## 2021-09-20 PROCEDURE — 74177 CT ABD & PELVIS W/CONTRAST: CPT

## 2021-09-20 PROCEDURE — 81025 URINE PREGNANCY TEST: CPT

## 2021-09-20 PROCEDURE — 93306 TTE W/DOPPLER COMPLETE: CPT

## 2021-09-20 PROCEDURE — 81001 URINALYSIS AUTO W/SCOPE: CPT

## 2021-09-20 PROCEDURE — 2580000003 HC RX 258: Performed by: NURSE PRACTITIONER

## 2021-09-20 PROCEDURE — 82947 ASSAY GLUCOSE BLOOD QUANT: CPT

## 2021-09-20 PROCEDURE — 83036 HEMOGLOBIN GLYCOSYLATED A1C: CPT

## 2021-09-20 RX ORDER — ONDANSETRON 2 MG/ML
4 INJECTION INTRAMUSCULAR; INTRAVENOUS EVERY 6 HOURS PRN
Status: DISCONTINUED | OUTPATIENT
Start: 2021-09-20 | End: 2021-09-22 | Stop reason: HOSPADM

## 2021-09-20 RX ORDER — 0.9 % SODIUM CHLORIDE 0.9 %
1000 INTRAVENOUS SOLUTION INTRAVENOUS ONCE
Status: COMPLETED | OUTPATIENT
Start: 2021-09-20 | End: 2021-09-20

## 2021-09-20 RX ORDER — ACETAMINOPHEN 650 MG/1
650 SUPPOSITORY RECTAL EVERY 6 HOURS PRN
Status: DISCONTINUED | OUTPATIENT
Start: 2021-09-20 | End: 2021-09-22 | Stop reason: HOSPADM

## 2021-09-20 RX ORDER — FAMOTIDINE 20 MG/1
20 TABLET, FILM COATED ORAL 2 TIMES DAILY
Status: DISCONTINUED | OUTPATIENT
Start: 2021-09-20 | End: 2021-09-22 | Stop reason: HOSPADM

## 2021-09-20 RX ORDER — NICOTINE POLACRILEX 4 MG
15 LOZENGE BUCCAL PRN
Status: DISCONTINUED | OUTPATIENT
Start: 2021-09-20 | End: 2021-09-22 | Stop reason: HOSPADM

## 2021-09-20 RX ORDER — CLINDAMYCIN HYDROCHLORIDE 150 MG/1
150 CAPSULE ORAL 4 TIMES DAILY
Status: ON HOLD | COMMUNITY
End: 2021-09-22 | Stop reason: HOSPADM

## 2021-09-20 RX ORDER — ONDANSETRON 2 MG/ML
4 INJECTION INTRAMUSCULAR; INTRAVENOUS ONCE
Status: COMPLETED | OUTPATIENT
Start: 2021-09-20 | End: 2021-09-20

## 2021-09-20 RX ORDER — SODIUM CHLORIDE 9 MG/ML
25 INJECTION, SOLUTION INTRAVENOUS PRN
Status: DISCONTINUED | OUTPATIENT
Start: 2021-09-20 | End: 2021-09-22 | Stop reason: HOSPADM

## 2021-09-20 RX ORDER — METHYLPREDNISOLONE 4 MG/1
4 TABLET ORAL SEE ADMIN INSTRUCTIONS
Status: ON HOLD | COMMUNITY
End: 2021-09-22 | Stop reason: HOSPADM

## 2021-09-20 RX ORDER — IBUPROFEN 600 MG/1
600 TABLET ORAL EVERY 6 HOURS PRN
Status: DISCONTINUED | OUTPATIENT
Start: 2021-09-20 | End: 2021-09-21

## 2021-09-20 RX ORDER — GLIPIZIDE 5 MG/1
5 TABLET ORAL
Status: DISCONTINUED | OUTPATIENT
Start: 2021-09-21 | End: 2021-09-22 | Stop reason: HOSPADM

## 2021-09-20 RX ORDER — DEXTROSE MONOHYDRATE 50 MG/ML
100 INJECTION, SOLUTION INTRAVENOUS PRN
Status: DISCONTINUED | OUTPATIENT
Start: 2021-09-20 | End: 2021-09-22 | Stop reason: HOSPADM

## 2021-09-20 RX ORDER — SODIUM CHLORIDE 9 MG/ML
INJECTION, SOLUTION INTRAVENOUS CONTINUOUS
Status: DISCONTINUED | OUTPATIENT
Start: 2021-09-20 | End: 2021-09-22 | Stop reason: HOSPADM

## 2021-09-20 RX ORDER — SODIUM CHLORIDE 0.9 % (FLUSH) 0.9 %
10 SYRINGE (ML) INJECTION EVERY 12 HOURS SCHEDULED
Status: DISCONTINUED | OUTPATIENT
Start: 2021-09-20 | End: 2021-09-22 | Stop reason: HOSPADM

## 2021-09-20 RX ORDER — DEXTROSE MONOHYDRATE 25 G/50ML
12.5 INJECTION, SOLUTION INTRAVENOUS PRN
Status: DISCONTINUED | OUTPATIENT
Start: 2021-09-20 | End: 2021-09-22 | Stop reason: HOSPADM

## 2021-09-20 RX ORDER — CALCIUM CARBONATE 200(500)MG
500 TABLET,CHEWABLE ORAL 3 TIMES DAILY PRN
Status: DISCONTINUED | OUTPATIENT
Start: 2021-09-20 | End: 2021-09-22 | Stop reason: HOSPADM

## 2021-09-20 RX ORDER — SODIUM CHLORIDE 0.9 % (FLUSH) 0.9 %
10 SYRINGE (ML) INJECTION PRN
Status: DISCONTINUED | OUTPATIENT
Start: 2021-09-20 | End: 2021-09-22 | Stop reason: HOSPADM

## 2021-09-20 RX ORDER — ACETAMINOPHEN 325 MG/1
650 TABLET ORAL EVERY 6 HOURS PRN
Status: DISCONTINUED | OUTPATIENT
Start: 2021-09-20 | End: 2021-09-22 | Stop reason: HOSPADM

## 2021-09-20 RX ORDER — METFORMIN HYDROCHLORIDE 500 MG/1
500 TABLET, EXTENDED RELEASE ORAL 2 TIMES DAILY WITH MEALS
Status: DISCONTINUED | OUTPATIENT
Start: 2021-09-20 | End: 2021-09-22 | Stop reason: HOSPADM

## 2021-09-20 RX ORDER — DICYCLOMINE HYDROCHLORIDE 10 MG/1
20 CAPSULE ORAL ONCE
Status: COMPLETED | OUTPATIENT
Start: 2021-09-20 | End: 2021-09-20

## 2021-09-20 RX ADMIN — INSULIN LISPRO 2 UNITS: 100 INJECTION, SOLUTION INTRAVENOUS; SUBCUTANEOUS at 16:59

## 2021-09-20 RX ADMIN — SODIUM CHLORIDE: 9 INJECTION, SOLUTION INTRAVENOUS at 15:52

## 2021-09-20 RX ADMIN — FAMOTIDINE 20 MG: 10 INJECTION, SOLUTION INTRAVENOUS at 09:55

## 2021-09-20 RX ADMIN — FAMOTIDINE 20 MG: 20 TABLET, FILM COATED ORAL at 20:29

## 2021-09-20 RX ADMIN — ENOXAPARIN SODIUM 40 MG: 40 INJECTION SUBCUTANEOUS at 15:52

## 2021-09-20 RX ADMIN — ACETAMINOPHEN 650 MG: 325 TABLET ORAL at 23:46

## 2021-09-20 RX ADMIN — SODIUM CHLORIDE 1000 ML: 9 INJECTION, SOLUTION INTRAVENOUS at 09:54

## 2021-09-20 RX ADMIN — ONDANSETRON 4 MG: 2 INJECTION INTRAMUSCULAR; INTRAVENOUS at 09:55

## 2021-09-20 RX ADMIN — IOPAMIDOL 75 ML: 755 INJECTION, SOLUTION INTRAVENOUS at 10:10

## 2021-09-20 RX ADMIN — METFORMIN HYDROCHLORIDE 500 MG: 500 TABLET, EXTENDED RELEASE ORAL at 16:59

## 2021-09-20 RX ADMIN — ONDANSETRON 4 MG: 2 INJECTION INTRAMUSCULAR; INTRAVENOUS at 16:59

## 2021-09-20 RX ADMIN — DICYCLOMINE HYDROCHLORIDE 20 MG: 10 CAPSULE ORAL at 14:00

## 2021-09-20 ASSESSMENT — PAIN DESCRIPTION - PROGRESSION: CLINICAL_PROGRESSION: RAPIDLY WORSENING

## 2021-09-20 ASSESSMENT — PAIN DESCRIPTION - ORIENTATION: ORIENTATION: LEFT;UPPER

## 2021-09-20 ASSESSMENT — PAIN DESCRIPTION - LOCATION
LOCATION: ABDOMEN
LOCATION: CHEST
LOCATION: ABDOMEN

## 2021-09-20 ASSESSMENT — PAIN DESCRIPTION - FREQUENCY
FREQUENCY: INTERMITTENT
FREQUENCY: CONTINUOUS

## 2021-09-20 ASSESSMENT — PAIN DESCRIPTION - ONSET: ONSET: ON-GOING

## 2021-09-20 ASSESSMENT — PAIN DESCRIPTION - PAIN TYPE
TYPE: ACUTE PAIN

## 2021-09-20 ASSESSMENT — PAIN DESCRIPTION - DESCRIPTORS
DESCRIPTORS: SHARP
DESCRIPTORS: CRUSHING

## 2021-09-20 ASSESSMENT — PAIN SCALES - GENERAL
PAINLEVEL_OUTOF10: 0
PAINLEVEL_OUTOF10: 8
PAINLEVEL_OUTOF10: 9
PAINLEVEL_OUTOF10: 8

## 2021-09-20 ASSESSMENT — PAIN - FUNCTIONAL ASSESSMENT: PAIN_FUNCTIONAL_ASSESSMENT: PREVENTS OR INTERFERES SOME ACTIVE ACTIVITIES AND ADLS

## 2021-09-20 NOTE — ED PROVIDER NOTES
[facility]      Pt Name: Aloha Dubin  MRN: [id number]  [place] 1990  Date of evaluation: 9/20/2021  Provider: Alma Pearson MD    CHIEF COMPLAINT       Chief Complaint   Patient presents with    Abdominal Pain     LUQ, onset last PM. Pt states she had wisdom teeth removed 3 days ago and has been taking antibiotics         HISTORY OF PRESENT ILLNESS   (Location/Symptom, Timing/Onset, Context/Setting, Quality, Duration, Modifying Factors, Severity)  Note limiting factors. Aloha Dubin is a 36 y.o. female who presents to the emergency department      37 yo female presented to the ED for evaluation of LUQ abdominal pain started during the evening. No diarrhea or constipation. Taking abx started after dental procedure. No fevers, no chills. Nausea with no vomiting. Nursing Notes were reviewed. REVIEW OF SYSTEMS    (2-9 systems for level 4, 10 or more for level 5)     Review of Systems   All other systems reviewed and are negative. Except as noted above the remainder of the review of systems was reviewed and negative.        PAST MEDICAL HISTORY     Past Medical History:   Diagnosis Date    Allergy     seasonal    Anxiety     ASCUS with positive high risk HPV 7/16/2013    Asthma childhood    Bipolar disorder ([facility] [address]     Bipolar disorder (H[facility])     Heart abnormality     HSIL on Pap smear 8/16/2012    PCOS (polycystic ovarian syndrome)     Postpartum depression     Type AB blood, Rh positive          SURGICAL HISTORY       Past Surgical History:   Procedure Laterality Date    DILATION AND CURETTAGE  age 17    miscarriage    DILATION AND CURETTAGE OF UTERUS  10/17/2018    Dr Spencer Umaña N/A 10/17/2018    DILATATION AND CURETTAGE LEEP- ECC performed by Denise Peck MD at [address] [place] [address] Medication List as of 9/22/2021 12:18 PM      CONTINUE these medications which have NOT CHANGED    Details   ibuprofen (ADVIL;MOTRIN) 600 MG tablet Take 1 tablet by mouth 3 times daily as needed for Pain, Disp-30 tablet, R-0Normal      buPROPion (WELLBUTRIN XL) 300 MG extended release tablet Take 1 tablet by mouth every morning, Disp-30 tablet, R-3Normal      OXcarbazepine (TRILEPTAL) 300 MG tablet Take 1 tablet by mouth 2 times daily, Disp-60 tablet, R-2Normal             ALLERGIES     Patient has no known allergies. FAMILY HISTORY       Family History   Problem Relation Age of Onset    Diabetes Father         double amputee          SOCIAL HISTORY       Social History     Socioeconomic History    Marital status: Legally      Spouse name: None    Number of children: None    Years of education: None    Highest education level: None   Occupational History     Employer: [email or web address]   Tobacco Use    Smoking status: Former Smoker     Types: Cigarettes    Smokeless tobacco: Never Used   Vaping Use    Vaping Use: Former    Substances: Never   Substance and Sexual Activity    Alcohol use: Yes     Alcohol/week: 0.0 standard drinks     Comment: socially    Drug use: Yes     Types: Marijuana    Sexual activity: Yes     Partners: Male   Other Topics Concern    None   Social History Narrative    None     Social Determinants of Health     Financial Resource Strain:     Difficulty of Paying Living Expenses:    Food Insecurity:     Worried About Running Out of Food in the Last Year:     Ran Out of Food in the Last Year:    Transportation Needs:     Lack of Transportation (Medical):      Lack of Transportation (Non-Medical):    Physical Activity:     Days of Exercise per Week:     Minutes of Exercise per Session:    Stress:     Feeling of Stress :    Social Connections:     Frequency of Communication with Friends and Family:     Frequency of Social Gatherings with Friends and Family:     Attends Holiness Services:     Active Member of Clubs or Organizations:  Attends Club or Organization Meetings:     Marital Status:    Intimate Partner Violence:     Fear of Current or Ex-Partner:     Emotionally Abused:     Physically Abused:     Sexually Abused:        SCREENINGS        Anderson Coma Scale  Eye Opening: Spontaneous  Best Verbal Response: Oriented  Best Motor Response: Obeys commands  Anderson Coma Scale Score: 15               PHYSICAL EXAM    (up to 7 for level 4, 8 or more for level 5)     ED Triage Vitals [09/20/21 0842]   BP Temp Temp Source Pulse Resp SpO2 Height Weight   -- 97.2 °F (36.2 °C) Tympanic 55 20 98 % 5' 5\" (1.651 m) 265 lb (120.2 kg)       Physical Exam  Vitals and nursing note reviewed. Constitutional:       General: She is not in acute distress. Appearance: She is not toxic-appearing. HENT:      Head: Normocephalic and atraumatic. Cardiovascular:      Rate and Rhythm: Regular rhythm. Comments: Episodes of bradycardia rate into 30's sinus rhythm  Abdominal:      Comments: LUQ tenderness. No rebound or guarding   Neurological:      General: No focal deficit present. Mental Status: She is alert and oriented to person, place, and time. Psychiatric:         Mood and Affect: Mood normal.         DIAGNOSTIC RESULTS     EKG: All EKG's are interpreted by the Emergency Department Physician who either signs or Co-signs this chart in the absence of a cardiologist.    Bradycardia rate of 41 bpm.  No acute ST segment or T wave findings. Otherwise normal EKG. RADIOLOGY:   Non-plain film images such as CT, Ultrasound and MRI are read by the radiologist. Plain radiographic images are visualized and preliminarily interpreted by the emergency physician with the below findings:        Interpretation per the Radiologist below, if available at the time of this note:    CT ABDOMEN PELVIS W IV CONTRAST Additional Contrast? None   Final Result   1. No acute abnormality identified. 2.  Hepatomegaly with steatosis.                ED BEDSIDE ULTRASOUND:   Performed by ED Physician - none    LABS:  Labs Reviewed   CBC WITH AUTO DIFFERENTIAL - Abnormal; Notable for the following components:       Result Value    Eosinophils % 0 (*)     Immature Granulocytes 1 (*)     All other components within normal limits   COMPREHENSIVE METABOLIC PANEL - Abnormal; Notable for the following components:    Glucose 285 (*)     Bun/Cre Ratio 29 (*)      (*)     AST 78 (*)     All other components within normal limits   URINALYSIS WITH MICROSCOPIC - Abnormal; Notable for the following components:    Glucose, Ur 3+ (*)     Ketones, Urine 1+ (*)     Specific Gravity, UA 1.025 (*)     Urine Hgb TRACE (*)     All other components within normal limits   HEMOGLOBIN A1C - Abnormal; Notable for the following components:    Hemoglobin A1C 11.0 (*)     All other components within normal limits   LIPID PANEL - Abnormal; Notable for the following components:    Cholesterol 259 (*)     HDL 38 (*)     LDL Cholesterol 142 (*)     Chol/HDL Ratio 6.8 (*)     Triglycerides 394 (*)     VLDL NOT REPORTED (*)     All other components within normal limits   GLUCOSE, WHOLE BLOOD - Abnormal; Notable for the following components:    POC Glucose 235 (*)     All other components within normal limits   GLUCOSE, WHOLE BLOOD - Abnormal; Notable for the following components:    POC Glucose 253 (*)     All other components within normal limits   GLUCOSE, WHOLE BLOOD - Abnormal; Notable for the following components:    POC Glucose 276 (*)     All other components within normal limits   GLUCOSE, WHOLE BLOOD - Abnormal; Notable for the following components:    POC Glucose 168 (*)     All other components within normal limits   GLUCOSE, WHOLE BLOOD - Abnormal; Notable for the following components:    POC Glucose 207 (*)     All other components within normal limits   GLUCOSE, WHOLE BLOOD - Abnormal; Notable for the following components:    POC Glucose 156 (*)     All other components within normal limits   GLUCOSE, WHOLE BLOOD - Abnormal; Notable for the following components:    POC Glucose 209 (*)     All other components within normal limits   GLUCOSE, WHOLE BLOOD - Abnormal; Notable for the following components:    POC Glucose 176 (*)     All other components within normal limits   COVID-19, RAPID   LACTIC ACID, PLASMA   LIPASE   PREGNANCY, URINE   TROPONIN   TROPONIN   PROTEIN / CREATININE RATIO, URINE   POCT GLUCOSE   POCT GLUCOSE   POCT GLUCOSE   POCT GLUCOSE   POCT GLUCOSE   POCT GLUCOSE   POCT GLUCOSE   POCT GLUCOSE       All other labs were within normal range or not returned as of this dictation. EMERGENCY DEPARTMENT COURSE and DIFFERENTIAL DIAGNOSIS/MDM:   Vitals:    Vitals:    09/22/21 0042 09/22/21 0455 09/22/21 0715 09/22/21 0730   BP: (!) 131/92  117/75    Pulse: (!) 49  54    Resp: 17  16    Temp: 98.4 °F (36.9 °C)  97.6 °F (36.4 °C)    TempSrc: Temporal  Temporal    SpO2: 96%  94%    Weight:  263 lb (119.3 kg)     Height:    5' 5\" (1.651 m)           MDM  Number of Diagnoses or Management Options  Abdominal pain, unspecified abdominal location  Bradycardia  New onset type 2 diabetes mellitus ([facility] [address] /  2021  Diagnosis management comments: 37 yo female LUQ abdominal pain. Epsidodes of bradycardia rate  Into 30's. Elevated blood glucose,no h/o DM. Patient discussed with Dr Rosalio Gonzalez who evaluated patient  In the ED. He requested admission to hospitalistt service  And cardiology consult. Patient accepted for admission      Erick Rivera   Total Critical Care time was  minutes, excluding separately reportable procedures. There was a high probability of clinically significant/life threatening deterioration in the patient's condition which required my urgent intervention. CONSULTS:  IP CONSULT TO CARDIOLOGY  IP CONSULT TO DIABETES EDUCATOR    PROCEDURES:  Unless otherwise noted below, none     Procedures        FINAL IMPRESSION      1.  Abdominal pain, unspecified abdominal location    2. Bradycardia    3. New onset type 2 diabetes mellitus ([facility] [address] / 2021          DISPOSITION/PLAN   DISPOSITION Admitted 09/20/2021 02:02:14 PM      PATIENT REFERRED TO:  MD Millicent Goss [address] [facility]  [phone]    Go on 9/28/2021  at 10:00 AM for hospital follow up     Sherlyn Pino MD  [address] [place] [address] Dr Derick Landon [address] [address] [phone]    Go on 10/8/2021  8:40am      DISCHARGE MEDICATIONS:  Discharge Medication List as of 9/22/2021 12:18 PM      START taking these medications    Details   glipiZIDE (GLUCOTROL) 5 MG tablet Take 1 tablet by mouth every morning (before breakfast), Disp-60 tablet, R-3Normal      metFORMIN (GLUCOPHAGE-XR) 500 MG extended release tablet Take 1 tablet by mouth 2 times daily (with meals), Disp-60 tablet, R-3Normal      atorvastatin (LIPITOR) 20 MG tablet Take 1 tablet by mouth nightly, Disp-30 tablet, R-3Normal      [employer] Lancets MISC 4 TIMES DAILY Starting Wed 9/22/2021, Disp-100 each, R-0, Print      fludrocortisone (FLORINEF) 0.1 MG tablet Take 1 tablet by mouth daily, Disp-30 tablet, R-1Normal           Controlled Substances Monitoring:     No flowsheet data found.     (Please note that portions of this note were completed with a voice recognition program.  Efforts were made to edit the dictations but occasionally words are mis-transcribed.)    Michael Motley MD (electronically signed)  Attending Emergency Physician             Michael Motley MD  09/24/21 1724

## 2021-09-20 NOTE — PROGRESS NOTES
Pt. Arrived to room alert and oriented x 4. Pt. Ambulates to the bathroom with steady gait. Pt. Is seated in bed pt. Appears comfortable. Pt. Family informed of our visitor policy d/t COVID, Family became upset. Nurse informed them it is just the policy we have to enforce. Pt. Is on antibiotics, antibiotics were taken and locked in cabinet in room family became upset with this as well stating the pt. Is not a child and she can take her own medications.  Pt.

## 2021-09-20 NOTE — CONSULTS
Vanna Hightower am scribing for and in the presence of Casie Hewitt MD, F.A.C.C..    Patient: Elissa Ellsworth  : 1990  Date of Admission: 2021  Primary Care Physician: Earlyne Fleischer  Today's Date: 2021    REASON FOR CONSULTATION: Abdominal Pain (LUQ, onset last PM. Pt states she had wisdom teeth removed 3 days ago and has been taking antibiotics)      HPI: Ms. Wilfredo Newman is a 36 y.o. female who presented to the emergency room because of abdominal pain and vomiting. Cardiology consulted because of bradycardia and history of syncope. She reported having history of abnormal heartbeats before. Her primary care provider done a Holter for her few years ago. She has history of recurrent passing out episodes. The last episode was about a month ago. She was in the kitchen and felt warm and dizzy and passed out. Her boyfriend caught her. No body injury reported. She attributed this specific episodes to stress because she was undergoing divorce. She reported drinking enough fluids. No sudden loss of consciousness. No loss of consciousness while sitting. As per her ex- who is at bedside, she has history of snoring and sometimes wakes up in the middle of the night choking. She also reported having daytime fatigue. Never been tested for obstructive sleep apnea syndrome. Ms. Wilfredo Newman she reports feeling chest heaviness. She is fairly active she takes care of 4 children. She does work from home currently. She denies knowing if she has sleep apnea. She does snore at night, she does wake herself up at times. She drinks 4-6 water bottles daily and a couple of cups of coffee daily. She denies any hypertension or diabetes during pregnancy. She reported that she was a started on antibiotic for her dental problem and she is complaining of periumbilical abdominal pain. She had an episode of vomiting this morning. Sharp pain that is intermittent.   She said the pain has improved a little but after getting the CT scan of the abdomen she has more pain and is currently holding her left upper quadrant. Her heart rate and telemetry is ranging between 40 and 50 bpm.  She cannot tell if she is dizzy or not because she is laying on bed for the past few days. ECG is reviewed, sinus bradycardia with no acute ischemic changes. Past Medical History:   Diagnosis Date    Allergy     seasonal    Anxiety     ASCUS with positive high risk HPV 7/16/2013    Asthma childhood    Bipolar disorder (HCC)     [facility] disorder ([facility] [address]     Heart abnormality     HSIL on Pap smear 8/16/2012    PCOS (polycystic ovarian syndrome)     Postpartum depression     Type AB blood, Rh positive        CURRENT ALLERGIES: Patient has no known allergies. REVIEW OF SYSTEMS: 14 systems were reviewed. Pertinent positives and negatives as above, all else negative. Past Surgical History:   Procedure Laterality Date    DILATION AND CURETTAGE  age 17    miscarriage    DILATION AND CURETTAGE OF UTERUS  10/17/2018    Dr Fouzia Jimenez N/A 10/17/2018    DILATATION AND CURETTAGE LEEP- ECC performed by Eamon Burgos MD at [address] History:  Social History     Tobacco Use    Smoking status: Former Smoker     Types: Cigarettes    Smokeless tobacco: Never Used   Vaping Use    Vaping Use: Former    Substances: Never   Substance Use Topics    Alcohol use: Yes     Alcohol/week: 0.0 standard drinks     Comment: socially    Drug use: Yes     Types: Marijuana        MEDICATIONS:  No current facility-administered medications for this encounter. CURRENT INPATIENT MEDICATIONS:  Prior to Admission medications    Medication Sig Start Date End Date Taking? Authorizing Provider   clindamycin (CLEOCIN) 150 MG capsule Take 150 mg by mouth 4 times daily   Yes Historical Provider, MD   methylPREDNISolone (MEDROL DOSEPACK) 4 MG tablet Take 4 mg by mouth See Admin Instructions Take by mouth.  09/20/2021     (H) 09/20/2021    AST 78 (H) 09/20/2021     09/20/2021    K 4.2 09/20/2021     09/20/2021    CREATININE 0.62 09/20/2021    BUN 18 09/20/2021    CO2 23 09/20/2021    TSH 3.02 08/13/2018    INR 1.0 04/20/2015    BNP 14 08/03/2013       ASSESSMENT:  Patient Active Problem List    Diagnosis Date Noted    HGSIL (high grade squamous intraepithelial lesion) on Pap smear of cervix     Headache 07/06/2015    Lower abdominal pain         PLAN:  Syncope/Near Syncope   Persistent sinus bradycardia  · History is very characteristic for vasovagal syncope. · Intermittent syncopal episodes, usually preceded by emotional stress. Patient feels the episodes coming with feeling warm, dizzy and then passing out. She never had injured herself. · No loss of consciousness while sitting. It is always during standing. · No history to suggest seizure disorder. No tongue biting, no loss of bowel or bladder continence. No seizure-like activities. · I had a long discussion with the patient and her ex- regarding current symptoms. I told her for vasovagal syncope usually avoiding trigger and adequate hydration are good enough for the management. · She has classic history of obstructive sleep apnea syndrome and she will need home sleep study for further evaluation. · After long discussion with the patient, she reported that she continues to complain of abdominal pain and she is nauseous and she has a very rough night yesterday. · From cardiac standpoint, getting an echo and observing her for 24-hour in addition to intravenous fluid hydration is critical because if patient got dehydrated she will have significant dizziness and possibly passing out episodes. · After control of her abdominal pain, she will need tilt table test and home sleep study as outlined above.   · Nonpharmacologic counseling: Because of her condition, I reminded her to try and keep herself well-hydrated and to take

## 2021-09-20 NOTE — ED NOTES
Dr Billy Sanchez called at office.  He is with a patient and will call us back     Theodore Guerra  09/20/21 [id number]

## 2021-09-20 NOTE — PROGRESS NOTES
Pt. Arrived to room 335, admission completed, vitals obtained. Pt. Resting comfortably in bed states pain is tolerable at this time. Pt. Denies any needs. Call light in reach, family at bedside, will continue to monitor.

## 2021-09-20 NOTE — ED NOTES
Patient to room 335 bedside report to [phone] TESSY Mascorro  09/20/21 [id number]

## 2021-09-21 ENCOUNTER — HOSPITAL ENCOUNTER (OUTPATIENT)
Dept: NON INVASIVE DIAGNOSTICS | Age: 31
Discharge: HOME | End: 2021-09-21
Payer: MEDICARE

## 2021-09-21 ENCOUNTER — TELEPHONE (OUTPATIENT)
Dept: DIABETES SERVICES | Age: 31
End: 2021-09-21

## 2021-09-21 PROBLEM — E11.9 NEW ONSET TYPE 2 DIABETES MELLITUS (HCC): Status: ACTIVE | Noted: 2021-09-21

## 2021-09-21 LAB
CREATININE URINE: 100.2 MG/DL (ref 28–217)
EKG ATRIAL RATE: 57 BPM
EKG P AXIS: 34 DEGREES
EKG P-R INTERVAL: 120 MS
EKG Q-T INTERVAL: 414 MS
EKG QRS DURATION: 94 MS
EKG QTC CALCULATION (BAZETT): 402 MS
EKG R AXIS: 61 DEGREES
EKG T AXIS: 8 DEGREES
EKG VENTRICULAR RATE: 57 BPM
ESTIMATED AVERAGE GLUCOSE: 269 MG/DL
GLUCOSE BLD-MCNC: 156 MG/DL (ref 74–100)
GLUCOSE BLD-MCNC: 168 MG/DL (ref 74–100)
GLUCOSE BLD-MCNC: 207 MG/DL (ref 74–100)
GLUCOSE BLD-MCNC: 253 MG/DL (ref 74–100)
GLUCOSE BLD-MCNC: 276 MG/DL (ref 74–100)
HBA1C MFR BLD: 11 % (ref 4–6)
TOTAL PROTEIN, URINE: 11 MG/DL
TROPONIN INTERP: NORMAL
TROPONIN T: NORMAL NG/ML
TROPONIN, HIGH SENSITIVITY: 7 NG/L (ref 0–14)
URINE TOTAL PROTEIN CREATININE RATIO: 0.11 (ref 0–0.2)

## 2021-09-21 PROCEDURE — 82570 ASSAY OF URINE CREATININE: CPT

## 2021-09-21 PROCEDURE — 6360000002 HC RX W HCPCS: Performed by: NURSE PRACTITIONER

## 2021-09-21 PROCEDURE — 6370000000 HC RX 637 (ALT 250 FOR IP): Performed by: FAMILY MEDICINE

## 2021-09-21 PROCEDURE — 93660 TILT TABLE EVALUATION: CPT

## 2021-09-21 PROCEDURE — 93010 ELECTROCARDIOGRAM REPORT: CPT | Performed by: INTERNAL MEDICINE

## 2021-09-21 PROCEDURE — 6370000000 HC RX 637 (ALT 250 FOR IP): Performed by: NURSE PRACTITIONER

## 2021-09-21 PROCEDURE — 94761 N-INVAS EAR/PLS OXIMETRY MLT: CPT

## 2021-09-21 PROCEDURE — G0378 HOSPITAL OBSERVATION PER HR: HCPCS

## 2021-09-21 PROCEDURE — 96372 THER/PROPH/DIAG INJ SC/IM: CPT

## 2021-09-21 PROCEDURE — 2580000003 HC RX 258: Performed by: NURSE PRACTITIONER

## 2021-09-21 PROCEDURE — 96375 TX/PRO/DX INJ NEW DRUG ADDON: CPT

## 2021-09-21 PROCEDURE — 99215 OFFICE O/P EST HI 40 MIN: CPT | Performed by: INTERNAL MEDICINE

## 2021-09-21 PROCEDURE — 6370000000 HC RX 637 (ALT 250 FOR IP): Performed by: INTERNAL MEDICINE

## 2021-09-21 PROCEDURE — 84156 ASSAY OF PROTEIN URINE: CPT

## 2021-09-21 RX ORDER — FLUDROCORTISONE ACETATE 0.1 MG/1
0.1 TABLET ORAL DAILY
Status: DISCONTINUED | OUTPATIENT
Start: 2021-09-22 | End: 2021-09-22 | Stop reason: HOSPADM

## 2021-09-21 RX ORDER — IBUPROFEN 600 MG/1
600 TABLET ORAL ONCE
Status: COMPLETED | OUTPATIENT
Start: 2021-09-21 | End: 2021-09-21

## 2021-09-21 RX ORDER — LORAZEPAM 2 MG/ML
0.5 INJECTION INTRAMUSCULAR ONCE
Status: COMPLETED | OUTPATIENT
Start: 2021-09-21 | End: 2021-09-21

## 2021-09-21 RX ORDER — NITROGLYCERIN 0.3 MG/1
0.3 TABLET SUBLINGUAL EVERY 5 MIN PRN
Status: DISCONTINUED | OUTPATIENT
Start: 2021-09-21 | End: 2021-09-22 | Stop reason: HOSPADM

## 2021-09-21 RX ORDER — ATORVASTATIN CALCIUM 20 MG/1
20 TABLET, FILM COATED ORAL NIGHTLY
Status: DISCONTINUED | OUTPATIENT
Start: 2021-09-21 | End: 2021-09-22 | Stop reason: HOSPADM

## 2021-09-21 RX ADMIN — IBUPROFEN 600 MG: 600 TABLET ORAL at 00:12

## 2021-09-21 RX ADMIN — NITROGLYCERIN 0.3 MG: 0.3 TABLET SUBLINGUAL at 14:31

## 2021-09-21 RX ADMIN — ATORVASTATIN CALCIUM 20 MG: 20 TABLET, FILM COATED ORAL at 21:45

## 2021-09-21 RX ADMIN — ANTACID TABLETS 500 MG: 500 TABLET, CHEWABLE ORAL at 00:12

## 2021-09-21 RX ADMIN — METFORMIN HYDROCHLORIDE 500 MG: 500 TABLET, EXTENDED RELEASE ORAL at 18:00

## 2021-09-21 RX ADMIN — INSULIN LISPRO 2 UNITS: 100 INJECTION, SOLUTION INTRAVENOUS; SUBCUTANEOUS at 17:58

## 2021-09-21 RX ADMIN — LORAZEPAM 0.5 MG: 2 INJECTION INTRAMUSCULAR; INTRAVENOUS at 11:46

## 2021-09-21 RX ADMIN — FAMOTIDINE 20 MG: 20 TABLET, FILM COATED ORAL at 09:00

## 2021-09-21 RX ADMIN — METFORMIN HYDROCHLORIDE 500 MG: 500 TABLET, EXTENDED RELEASE ORAL at 09:00

## 2021-09-21 RX ADMIN — ENOXAPARIN SODIUM 40 MG: 40 INJECTION SUBCUTANEOUS at 09:00

## 2021-09-21 RX ADMIN — GLIPIZIDE 5 MG: 5 TABLET ORAL at 09:00

## 2021-09-21 RX ADMIN — INSULIN LISPRO 3 UNITS: 100 INJECTION, SOLUTION INTRAVENOUS; SUBCUTANEOUS at 09:00

## 2021-09-21 RX ADMIN — FAMOTIDINE 20 MG: 20 TABLET, FILM COATED ORAL at 21:45

## 2021-09-21 RX ADMIN — SODIUM CHLORIDE: 9 INJECTION, SOLUTION INTRAVENOUS at 18:05

## 2021-09-21 RX ADMIN — ACETAMINOPHEN 650 MG: 325 TABLET ORAL at 18:02

## 2021-09-21 ASSESSMENT — PAIN DESCRIPTION - PAIN TYPE
TYPE: ACUTE PAIN

## 2021-09-21 ASSESSMENT — PAIN DESCRIPTION - ONSET
ONSET: ON-GOING
ONSET: SUDDEN
ONSET: ON-GOING
ONSET: ON-GOING

## 2021-09-21 ASSESSMENT — PAIN DESCRIPTION - DESCRIPTORS
DESCRIPTORS: HEAVINESS;PRESSURE
DESCRIPTORS: HEAVINESS;PRESSURE;SHARP
DESCRIPTORS: HEAVINESS;PRESSURE;SHARP
DESCRIPTORS: HEAVINESS
DESCRIPTORS: HEAVINESS;PRESSURE;SHARP
DESCRIPTORS: HEAVINESS;PRESSURE
DESCRIPTORS: PRESSURE

## 2021-09-21 ASSESSMENT — PAIN DESCRIPTION - ORIENTATION
ORIENTATION: LEFT;MID;UPPER
ORIENTATION: LEFT;UPPER
ORIENTATION: LEFT;MID;UPPER
ORIENTATION: LEFT;MID;UPPER

## 2021-09-21 ASSESSMENT — PAIN DESCRIPTION - FREQUENCY
FREQUENCY: CONTINUOUS
FREQUENCY: INTERMITTENT
FREQUENCY: CONTINUOUS

## 2021-09-21 ASSESSMENT — PAIN SCALES - GENERAL
PAINLEVEL_OUTOF10: 3
PAINLEVEL_OUTOF10: 7
PAINLEVEL_OUTOF10: 6
PAINLEVEL_OUTOF10: 8
PAINLEVEL_OUTOF10: 9
PAINLEVEL_OUTOF10: 5
PAINLEVEL_OUTOF10: 10
PAINLEVEL_OUTOF10: 7
PAINLEVEL_OUTOF10: 0
PAINLEVEL_OUTOF10: 9

## 2021-09-21 ASSESSMENT — PAIN DESCRIPTION - LOCATION
LOCATION: CHEST

## 2021-09-21 ASSESSMENT — PAIN - FUNCTIONAL ASSESSMENT
PAIN_FUNCTIONAL_ASSESSMENT: ACTIVITIES ARE NOT PREVENTED
PAIN_FUNCTIONAL_ASSESSMENT: PREVENTS OR INTERFERES WITH MANY ACTIVE NOT PASSIVE ACTIVITIES

## 2021-09-21 ASSESSMENT — PAIN DESCRIPTION - PROGRESSION
CLINICAL_PROGRESSION: GRADUALLY IMPROVING
CLINICAL_PROGRESSION: GRADUALLY IMPROVING
CLINICAL_PROGRESSION: GRADUALLY WORSENING
CLINICAL_PROGRESSION: NOT CHANGED
CLINICAL_PROGRESSION: GRADUALLY IMPROVING
CLINICAL_PROGRESSION: RAPIDLY WORSENING

## 2021-09-21 NOTE — PROGRESS NOTES
Called to speak with patient regarding diagnosis of diabetes with A1C of 11%. \"Boyfriend\" answers phone and states pt is in too much pain to talk, and is waiting for pain medication. Talked briefly with boyfriend who states, Madina Minaya just diagnosed her with it but she has it in her family so she is familiar with it. \" Will attempt to call back at a later time.      [facility]  Diabetes clinic educator  9/21/2021  11:45 AM

## 2021-09-21 NOTE — PROGRESS NOTES
DIABETES EDUCATION  Spoke with pt via telephone for approx 15 minutes for inpatient diabetes education. This diagnosis is new for her but pt states her father had diabetes, so she is familiar with it. Reviewed what diabetes is and how it is treated. Discussed monitoring her blood sugar, target numbers before and after meals, hyper/hypoglycemia, and treatment. Reviewed using an insulin pen and disposing of the needles properly. States she used to dial up her father's insulin dose for him so is familiar with insulin pens. Discussed proper storage of the pens. All questions answered. Denies further questions. Nurse to give diabetes education folder. Labs:  Lab Results   Component Value Date    LABA1C 11.0 (H) 09/20/2021     Lab Results   Component Value Date    CREATININE 0.62 09/20/2021       Did Dietitian see patient? [] Yes  [x] No    Does patient have a monitor and strips? [] Yes      [x] No        Frequency of monitoring at home: New dx    Can patient afford medications? []  Yes     [] No   Medication taking at home:    [] Oral medication   [] Insulin     [] Other     [] None    New to insulin? [x] Yes       [] No   Instructed on insulin use? [x] Yes      [] No       [] N/A    Discussed:   [] Where do I Begin?  booklet   [x] What is Diabetes? [x] What is Insulin?               [x] Hypo- hyperglycemia    [x] Diabetes pills                     [x] How to Check your sugar  [x] Be safe with needles   [] Sick Days with Diabetes    [x] When to Call the Doctor  [] Other                                  [x] Type 2 DM and adding Insulin       [x] Carbohydrate Counting for People with Diabetes       [facility]  Diabetes clinic educator  9/21/2021  1:40 PM

## 2021-09-21 NOTE — PROGRESS NOTES
Attempted to assess for discharge needs this a.m. but Patient not feeling well and LSW was asked to return at a later time.     [address]  9/21/2021

## 2021-09-21 NOTE — PROGRESS NOTES
Pt c/o severe chest pain, pressure/heaviness/sharp, 10/10. Pt crying, tachypnea, BP and HR as charted in flow sheet. Pt is sitting edge of bed, rocking back and forth, holding mid/left chest. Pt becomes very upset when ask if she feels like it is a panic attack and instructed to try to calm down and take deep breathes. O2 at [facility] administered for discomfort and RR is >30. Dinah Ruiz CNP, called and informed. N.O. Ativan 0.25mg IV once  Will continue to monitor.

## 2021-09-21 NOTE — PROGRESS NOTES
After giving tums and ibuprofen, giving emotional support and allowing patient to attempt to relax. Patient now states her pain is 5/10 but states the crushing pressure is still there but is somewhat tolerable now. Vital signs and assessment completed. Writer to continue to closely monitor patient encourage patient to call out if something were to happen again. Patient denies any other needs at this time. Call light, bedside table and personal belongings within reach. Will continue to monitor.

## 2021-09-21 NOTE — PROGRESS NOTES
x3 nurses at bedside. EKG still being attempted. Dorita HORTON at bedside. Patient put on 2L of nasal cannula O2 for comfort. Dr. Dayna Jung being paged again at this time.

## 2021-09-21 NOTE — PROGRESS NOTES
Patient stating she is having 8/10 chest pain, severe pain and pressure on her left side of chest. Patient is thriving around in bed. Breathing very heavy and is diaphoretic. x3 nurses attempted to obtain EKG on patient and was unable to due to techincal issues with machine and patient moving around in obvious pain. Both [facility] unit and ICU unit EKG machines were attempted.

## 2021-09-21 NOTE — H&P
Patient:  Marivel Ortiz  MRN: [id number]    Chief Complaint:    Chief Complaint   Patient presents with    Abdominal Pain     LUQ, onset last PM. Pt states she had wisdom teeth removed 3 days ago and has been taking antibiotics       History Obtained From:  patient, electronic medical record    PCP: Aly Palm MD    History of Present Illness: The patient is a 36 y.o. female who presents with  Presented with left lower quadrant abdominal pain in left upper quadrant abdominal pain last evening she had wisdom teeth removed and she has been on some antibiotics and she was concerned it was a problem. She has not felt particularly well. She had an episode of bradycardia while being in the emergency department and felt lightheaded. She has had this in the past but it has not been often. Past Medical History:        Diagnosis Date    Allergy     seasonal    Anxiety     ASCUS with positive high risk HPV 7/16/2013    Asthma childhood    Bipolar disorder (HCC)     Bipolar disorder ([facility] [address]     Heart abnormality     HSIL on Pap smear 8/16/2012    PCOS (polycystic ovarian syndrome)     Postpartum depression     Type AB blood, Rh positive        Past Surgical History:        Procedure Laterality Date    DILATION AND CURETTAGE  age 17    miscarriage    DILATION AND CURETTAGE OF UTERUS  10/17/2018    Dr Bib Galo N/A 10/17/2018    DILATATION AND CURETTAGE LEEP- ECC performed by Mariya Griffin MD at [address]       Family History:       Problem Relation Age of Onset    Diabetes Father         double amputee       Social History:   TOBACCO:   reports that she has quit smoking. Her smoking use included cigarettes. She has never used smokeless tobacco.  ETOH:   reports current alcohol use. Allergies:  Patient has no known allergies. Medications Prior to Admission:    Prior to Admission medications    Medication Sig Start Date End Date Taking?  Authorizing Provider clindamycin (CLEOCIN) 150 MG capsule Take 150 mg by mouth 4 times daily   Yes Historical Provider, MD   methylPREDNISolone (MEDROL DOSEPACK) 4 MG tablet Take 4 mg by mouth See Admin Instructions Take by mouth. Yes Historical Provider, MD   ibuprofen (ADVIL;MOTRIN) 600 MG tablet Take 1 tablet by mouth 3 times daily as needed for Pain 4/6/21  Yes Campos Golden MD   buPROPion (WELLBUTRIN XL) 300 MG extended release tablet Take 1 tablet by mouth every morning 1/22/21   Yadira Herzog MD   OXcarbazepine (TRILEPTAL) 300 MG tablet Take 1 tablet by mouth 2 times daily 1/22/21 4/22/21  Yadira Herzog MD       Review of Systems:  Constitutional:negative  for fevers, and negative for chills. Eyes: negative for visual disturbance   ENT: negative for sore throat, negative nasal congestion, and negative for earache  Respiratory: negative for shortness of breath, negative for cough, and negative for wheezing  Cardiovascular: negative for chest pain, negative for palpitations, and negative for syncope  Gastrointestinal: negative for abdominal pain, negative for nausea,negative for vomiting, negative for diarrhea, negative for constipation, and negative for hematochezia or melena  Genitourinary: negative for dysuria, negative for urinary urgency, negative for urinary frequency, and negative for hematuria  Skin: negative for skin rash, and negative for skin lesions  Neurological: negative for unilateral weakness, numbness or tingling. Physical Exam:    Vitals:   Temp: 98.2 °F (36.8 °C)  BP: 119/86  Resp: (!) 32  Pulse: 58  SpO2: 99 %  24HR INTAKE/OUTPUT:      Intake/Output Summary (Last 24 hours) at 9/21/2021 1604  Last data filed at 9/21/2021 5627  Gross per 24 hour   Intake 400 ml   Output --   Net 400 ml       Exam:  GEN:  alert & appropriate appearance  EYES: normal eyes, non icteric  NECK: supple with no lymphadenopathy,  no bruits noted  PULM: clear with no rales or rhonchi.  No wheezes  COR: regular with rate and rhythm  ABD:  soft & NT, No distension  EXT:   no edema noted  NEURO: follows commands, REYNOSO, no deficits  SKIN:  negative  -----------------------------------------------------------------  Diagnostic Data:   Lab Results   Component Value Date    WBC 10.8 09/20/2021    HGB 14.9 09/20/2021     09/20/2021       Lab Results   Component Value Date    BUN 18 09/20/2021    CREATININE 0.62 09/20/2021     09/20/2021    K 4.2 09/20/2021    CALCIUM 9.5 09/20/2021     09/20/2021    CO2 23 09/20/2021    LABGLOM >60 09/20/2021       Lab Results   Component Value Date    WBCUA 0 TO 2 09/20/2021    RBCUA None 09/20/2021    EPITHUA 0 TO 2 09/20/2021    LEUKOCYTESUR NEGATIVE 09/20/2021    SPECGRAV 1.025 (H) 09/20/2021    GLUCOSEU 3+ (A) 09/20/2021    KETUA 1+ (A) 09/20/2021    PROTEINU NEGATIVE 09/20/2021    HGBUR TRACE (A) 09/20/2021    CASTUA NOT REPORTED 09/20/2021    CRYSTUA NOT REPORTED 09/20/2021    BACTERIA NOT REPORTED 09/20/2021    YEAST NOT REPORTED 09/20/2021       Lab Results   Component Value Date    TROPONINT NOT REPORTED 09/20/2021       No results found. Assessment:    Principal Problem:    Bradycardia  likely vagal related  Active Problems:    New onset type 2 diabetes mellitus ([facility] [address] /  2021  Resolved Problems:    * No resolved hospital problems.  *      Patient Active Problem List    Diagnosis Date Noted    [facility] /  2021 09/21/2021    Bradycardia  likely vagal related 09/20/2021    HGSIL (high grade squamous intraepithelial lesion) on Pap smear of cervix     Headache 07/06/2015    Lower abdominal pain  completely normal workup        Plan:     · This patient requires overnight observation because of   Bradycardia  Factors affecting the medical complexity of this patient include Principal Problem:    Bradycardia  likely vagal related  Active Problems:    New onset type 2 diabetes mellitus ([facility] [address] /  2021  Resolved Problems:    * No resolved hospital problems. *  ·   · Estimated length of stay is 1 days  ·  blood sugar control, fluids. Observation. Did not find a source for abdominal issue. Likely could be related to new onset type 2 diabetes. Patient may need tilt-table test as an outpatient. · High risk medication monitorin    CORE MEASURES  Core measures including DVT prophylaxis, Code Status, Nutrition, Therapy Options, chart reviewed and advance directives reviewed at this visit.     Vikki Ramon MD, MD  2021, 4:04 PM

## 2021-09-21 NOTE — PROGRESS NOTES
Nutrition Education    · Information attached to discharge instructions regarding Diabetes and diet    Patient off unit at tilt table  Would recommend o/p education     Electronically signed by Betty Ward RD, LD on 9/21/21 at 2:19 PM EDT    Contact: [phone]

## 2021-09-21 NOTE — PROGRESS NOTES
Troponins' being drawn on patient at this time. Writer still awaiting call back from Dr. Santana Deshpande.

## 2021-09-21 NOTE — PROGRESS NOTES
Dr. Nils Kinney called back at this time. Dr. Nils Kinney stated he is not worried about any cardiac issues. Dr. Nils Kinney stated to give patient Tums and x1 dose of Ibuprofen 600mg.      Writer did make sure Dr. Nils Kinney was aware of patient's recent wisdom teeth removal.

## 2021-09-21 NOTE — PROGRESS NOTES
Pt resting in recliner, awake, call light within reach. Pt does not appear to be in distress, states pain is 5-6/10, to chest, FLACC 0. Pt informed that tilt table will be at approx 1400. Will continue to monitor.

## 2021-09-21 NOTE — PROGRESS NOTES
Call placed to Dr. Rosalio Gonzalez regarding pt's c/o severe pain. Voicemail message left on cell phone. Await return call.

## 2021-09-21 NOTE — PROGRESS NOTES
Patient: Simran Nino  : 1990  Date of Admission: 2021  Primary Care Physician: Mohan Lopez  Today's Date: 2021    REASON FOR CONSULTATION: Abdominal Pain (LUQ, onset last PM. Pt states she had wisdom teeth removed 3 days ago and has been taking antibiotics)      HPI: Ms. Jean Pierre Michael is a 36 y.o. female who presented to the emergency room because of abdominal pain and vomiting. Cardiology consulted because of bradycardia and history of syncope. Ms. Jean Pierre Michael reported family history of premature CAD. She has history of untreated dyslipidemia and during this admission she is diagnosed with new onset diabetes. Her hemoglobin A1c is 11%. She also gives a history very classic for obstructive sleep apnea syndrome, never been tested for sleep apnea before. I told her we have to do a home sleep study as an outpatient. She reported having recurrent syncopal episodes. History is highly suggestive of neurocardiogenic dysfunction/vasovagal syncope. Her echo done in the emergency room yesterday is very much unremarkable. She had a tilt table test done today that came back abnormal after 26 minutes into the test. Heart rate and blood pressure response were consistent with neurocardiogenic dysfunction. I had a long discussion with the patient regarding management of her current symptoms. I told her we have to treat her diabetes, start her on Florinef for neurocardiogenic dysfunction possibly adding midodrine and/or SSRIs if her symptoms presented. I sent iron protein creatinine ratio and its is normal. There is no reason for us to start her on ACE inhibitor or ARB at this point. Patient also has significant dyslipidemia, type 2 diabetes and family history of premature CAD. I told her we will start her on Lipitor 20 mg every night. Further management of her medical problems will be done as an outpatient. She will be discharged tomorrow with a Cam monitor.        Past Medical History:   Diagnosis Date    Allergy     seasonal    Anxiety     ASCUS with positive high risk HPV 7/16/2013    Asthma childhood    [facility] ([facility] [address]     Bipolar disorder ([facility] [address] [facility]     Heart abnormality     HSIL on Pap smear 8/16/2012    PCOS (polycystic ovarian syndrome)     Postpartum depression     Type AB blood, Rh positive        CURRENT ALLERGIES: Patient has no known allergies. REVIEW OF SYSTEMS: 14 systems were reviewed. Pertinent positives and negatives as above, all else negative. Past Surgical History:   Procedure Laterality Date    DILATION AND CURETTAGE  age 17    miscarriage    DILATION AND CURETTAGE OF UTERUS  10/17/2018    Dr Veronica Lopez- Teena Trent N/A 10/17/2018    DILATATION AND CURETTAGE LEEP- ECC performed by Trista Kinsey MD at [address] History:  Social History     Tobacco Use    Smoking status: Former Smoker     Types: Cigarettes    Smokeless tobacco: Never Used   Vaping Use    Vaping Use: Former    Substances: Never   Substance Use Topics    Alcohol use:  Yes     Alcohol/week: 0.0 standard drinks     Comment: socially    Drug use: Yes     Types: Marijuana        MEDICATIONS:  0.9 % sodium chloride infusion, Continuous  sodium chloride flush 0.9 % injection 10 mL, 2 times per day  sodium chloride flush 0.9 % injection 10 mL, PRN  0.9 % sodium chloride infusion, PRN  enoxaparin (LOVENOX) injection 40 mg, Daily  famotidine (PEPCID) tablet 20 mg, BID  acetaminophen (TYLENOL) tablet 650 mg, Q6H PRN   Or  acetaminophen (TYLENOL) suppository 650 mg, Q6H PRN  ondansetron (ZOFRAN) injection 4 mg, Q6H PRN  metFORMIN (GLUCOPHAGE-XR) extended release tablet 500 mg, BID WC  glipiZIDE (GLUCOTROL) tablet 5 mg, QAM AC  insulin lispro (HUMALOG) injection vial 0-6 Units, TID WC  insulin lispro (HUMALOG) injection vial 0-3 Units, Nightly  glucose (GLUTOSE) 40 % oral gel 15 g, PRN  dextrose 50 % IV solution, PRN  glucagon (rDNA) injection 1 mg, PRN  dextrose 5 % solution, PRN  calcium carbonate (TUMS) chewable tablet 500 mg, TID PRN        CURRENT INPATIENT MEDICATIONS:  Prior to Admission medications    Medication Sig Start Date End Date Taking? Authorizing Provider   clindamycin (CLEOCIN) 150 MG capsule Take 150 mg by mouth 4 times daily   Yes Historical Provider, MD   methylPREDNISolone (MEDROL DOSEPACK) 4 MG tablet Take 4 mg by mouth See Admin Instructions Take by mouth. Yes Historical Provider, MD   ibuprofen (ADVIL;MOTRIN) 600 MG tablet Take 1 tablet by mouth 3 times daily as needed for Pain 4/6/21  Yes Jenna Garcia MD   buPROPion (WELLBUTRIN XL) 300 MG extended release tablet Take 1 tablet by mouth every morning 1/22/21   Yadira Call MD   OXcarbazepine (TRILEPTAL) 300 MG tablet Take 1 tablet by mouth 2 times daily 1/22/21 4/22/21  Ele Yanez MD       FAMILY HISTORY: family history includes Diabetes in her father. PHYSICAL EXAM:   /84   Pulse (!) 48   Temp 98.2 °F (36.8 °C) (Temporal)   Resp 16   Ht 5' 5\" (1.651 m)   Wt 265 lb 3.2 oz (120.3 kg)   LMP 09/17/2021   SpO2 96%   BMI 44.13 kg/m²  Body mass index is 44.13 kg/m². Constitutional: She is oriented to person, place, and time. She appears well-developed and well-nourished. In no acute distress. HEENT: Normocephalic and atraumatic. No JVD present. Carotid bruit is not present. No mass and no thyromegaly present. No lymphadenopathy present. Cardiovascular: Bradycardic rate, regular rhythm, normal heart sounds. Exam reveals no gallop and no friction rubs. No heart murmur heard. Pulmonary/Chest: Effort normal and breath sounds normal. No respiratory distress. She has no wheezes, rhonchi or rales. Abdominal: Soft, non-tender. Bowel sounds and aorta are normal. She exhibits no organomegaly, mass or bruit. Extremities: No edema. No cyanosis and no clubbing. Pulses are 2+ radial and carotid pulses. 2+ dorsalis pedis and posterior tibial pulses bilaterally.   Neurological: She is alert and oriented to person, place, and time. No evidence of gross cranial nerve deficit. Coordination appeared normal.   Skin: Skin is warm and dry. There is no rash or diaphoresis. Psychiatric: She has a normal mood and affect. Her speech is normal and behavior is normal.      MOST RECENT LABS ON RECORD:   Lab Results   Component Value Date    WBC 10.8 09/20/2021    HGB 14.9 09/20/2021    HCT 43.9 09/20/2021     09/20/2021    CHOL 259 (H) 09/20/2021    TRIG 394 (H) 09/20/2021    HDL 38 (L) 09/20/2021     (H) 09/20/2021    AST 78 (H) 09/20/2021     09/20/2021    K 4.2 09/20/2021     09/20/2021    CREATININE 0.62 09/20/2021    BUN 18 09/20/2021    CO2 23 09/20/2021    TSH 3.02 08/13/2018    INR 1.0 04/20/2015    LABA1C 11.0 (H) 09/20/2021    BNP 14 08/03/2013       ASSESSMENT:  Patient Active Problem List    Diagnosis Date Noted    Bradycardia  likely vagal related 09/20/2021    HGSIL (high grade squamous intraepithelial lesion) on Pap smear of cervix     Headache 07/06/2015    Lower abdominal pain  completely normal workup         PLAN:  Syncope/Near Syncope   Persistent sinus bradycardia  · History is very characteristic for vasovagal syncope. · Intermittent syncopal episodes, usually preceded by emotional stress. Patient feels the episodes coming with feeling warm, dizzy and then passing out. She never had injured herself. · No loss of consciousness while sitting. It is always during standing. · No history to suggest seizure disorder. No tongue biting, no loss of bowel or bladder continence. No seizure-like activities. · Continue good hydration. · She has classic history of obstructive sleep apnea syndrome and she will need home sleep study for further evaluation. · Echo reviewed with the patient, structurally normal heart. · Mildly abnormal tilt table test as outlined above consistent with neurocardiogenic dysfunction.   · We will start her on Florinef 0.1 mg daily.  · I told her we need to treat her diabetes and dyslipidemia. · She can be discharged on CAM monitor tomorrow morning. I may consider plain treadmill exercise test prior to discharge if she continues to have chest pain overnight. · I got a urine protein creatinine ratio, no significant proteinuria. No need for ACE inhibitor or ARB at this point. · Nonpharmacologic counseling: Because of her condition, I reminded her to try and keep herself well-hydrated and to take extra time when moving from laying to sitting, sitting to standing and standing to walking. I also explained to her to help improve her symptoms she should include 3 g sodium diet, 1 or 2 L of sports drinks daily, Knee-high compressions stockings and regular exercise. I also told her to practice, learn, and regularly implement vasovagal abortive counter pressure counter measure maneuvers which are simply isometric exercises of the upper and lower extremities, called leg crossing and arm tensing, which are to be performed on a regular basis and practice and applied during the her syncopal episodes.  Suspected Obstructive Sleep Apnea: Given Ms. Phoenix's symptoms of daytime tiredness and not waking up rested in the morning, I advised her to consider undergoing a sleep apnea screening which she agreed to do. Therefore I ordered a Apnea link home screening monitor for her. Morbid obesity: Body mass index is 44.13 kg/m². I also briefly discussed both diet and exercise strategies for her to continue to loses weight and she was very receptive to this. · History of marijuana abuse. · Counseled patient regarding substance abuse disorder. She is very receptive and she is actively trying to quit using marijuana. · Diabetes  · Continue current antidiabetic medications as per primary team.  · Start Lipitor 20 mg every night. Once again, thank you for allowing me to participate in this patients care.  Please do not hesitate to contact me if I could be of any further assistance. Sincerely,  Zhou Rodriguez PA-C  [facility] Cardiology Specialist    [address], [address] [facility]  Phone: [phone], Fax: [phone]     I believe that the risk of significant morbidity and mortality related to the patient's current medical conditions are: Intermediate.

## 2021-09-22 ENCOUNTER — HOSPITAL ENCOUNTER (OUTPATIENT)
Dept: NON INVASIVE DIAGNOSTICS | Age: 31
Discharge: HOME | End: 2021-09-22
Payer: MEDICARE

## 2021-09-22 VITALS
SYSTOLIC BLOOD PRESSURE: 117 MMHG | DIASTOLIC BLOOD PRESSURE: 75 MMHG | TEMPERATURE: 97.6 F | HEART RATE: 54 BPM | WEIGHT: 263 LBS | BODY MASS INDEX: 43.82 KG/M2 | OXYGEN SATURATION: 94 % | HEIGHT: 65 IN | RESPIRATION RATE: 16 BRPM

## 2021-09-22 DIAGNOSIS — G47.33 OSA (OBSTRUCTIVE SLEEP APNEA): Primary | ICD-10-CM

## 2021-09-22 PROBLEM — R55 NEUROCARDIOGENIC SYNCOPE: Status: ACTIVE | Noted: 2021-09-22

## 2021-09-22 LAB
EKG ATRIAL RATE: 59 BPM
EKG P AXIS: 33 DEGREES
EKG P-R INTERVAL: 162 MS
EKG Q-T INTERVAL: 464 MS
EKG QRS DURATION: 98 MS
EKG QTC CALCULATION (BAZETT): 459 MS
EKG R AXIS: 49 DEGREES
EKG T AXIS: 12 DEGREES
EKG VENTRICULAR RATE: 59 BPM
GLUCOSE BLD-MCNC: 176 MG/DL (ref 74–100)
GLUCOSE BLD-MCNC: 209 MG/DL (ref 74–100)

## 2021-09-22 PROCEDURE — 82947 ASSAY GLUCOSE BLOOD QUANT: CPT

## 2021-09-22 PROCEDURE — 2580000003 HC RX 258: Performed by: NURSE PRACTITIONER

## 2021-09-22 PROCEDURE — 93242 EXT ECG>48HR<7D RECORDING: CPT

## 2021-09-22 PROCEDURE — 99214 OFFICE O/P EST MOD 30 MIN: CPT | Performed by: INTERNAL MEDICINE

## 2021-09-22 PROCEDURE — 93243 EXT ECG>48HR<7D SCAN A/R: CPT

## 2021-09-22 PROCEDURE — 93010 ELECTROCARDIOGRAM REPORT: CPT | Performed by: INTERNAL MEDICINE

## 2021-09-22 PROCEDURE — 6360000002 HC RX W HCPCS: Performed by: NURSE PRACTITIONER

## 2021-09-22 PROCEDURE — 94761 N-INVAS EAR/PLS OXIMETRY MLT: CPT

## 2021-09-22 PROCEDURE — 96372 THER/PROPH/DIAG INJ SC/IM: CPT

## 2021-09-22 PROCEDURE — 6370000000 HC RX 637 (ALT 250 FOR IP): Performed by: INTERNAL MEDICINE

## 2021-09-22 PROCEDURE — G0378 HOSPITAL OBSERVATION PER HR: HCPCS

## 2021-09-22 PROCEDURE — 6370000000 HC RX 637 (ALT 250 FOR IP): Performed by: NURSE PRACTITIONER

## 2021-09-22 PROCEDURE — 93005 ELECTROCARDIOGRAM TRACING: CPT | Performed by: INTERNAL MEDICINE

## 2021-09-22 RX ORDER — GLIPIZIDE 5 MG/1
5 TABLET ORAL
Qty: 60 TABLET | Refills: 3 | Status: SHIPPED | OUTPATIENT
Start: 2021-09-23 | End: 2022-10-04

## 2021-09-22 RX ORDER — METFORMIN HYDROCHLORIDE 500 MG/1
500 TABLET, EXTENDED RELEASE ORAL 2 TIMES DAILY WITH MEALS
Qty: 60 TABLET | Refills: 3 | Status: SHIPPED | OUTPATIENT
Start: 2021-09-22

## 2021-09-22 RX ORDER — ATORVASTATIN CALCIUM 20 MG/1
20 TABLET, FILM COATED ORAL NIGHTLY
Qty: 30 TABLET | Refills: 3 | Status: SHIPPED | OUTPATIENT
Start: 2021-09-22 | End: 2022-10-04

## 2021-09-22 RX ORDER — DEXTROSE 4 G
1 TABLET,CHEWABLE ORAL 4 TIMES DAILY
Qty: 100 EACH | Refills: 0 | Status: SHIPPED | OUTPATIENT
Start: 2021-09-22 | End: 2022-02-18

## 2021-09-22 RX ORDER — FLUDROCORTISONE ACETATE 0.1 MG/1
0.1 TABLET ORAL DAILY
Qty: 30 TABLET | Refills: 1 | Status: SHIPPED | OUTPATIENT
Start: 2021-09-23 | End: 2021-09-24

## 2021-09-22 RX ADMIN — GLIPIZIDE 5 MG: 5 TABLET ORAL at 07:22

## 2021-09-22 RX ADMIN — INSULIN LISPRO 1 UNITS: 100 INJECTION, SOLUTION INTRAVENOUS; SUBCUTANEOUS at 11:57

## 2021-09-22 RX ADMIN — INSULIN LISPRO 2 UNITS: 100 INJECTION, SOLUTION INTRAVENOUS; SUBCUTANEOUS at 08:58

## 2021-09-22 RX ADMIN — METFORMIN HYDROCHLORIDE 500 MG: 500 TABLET, EXTENDED RELEASE ORAL at 09:05

## 2021-09-22 RX ADMIN — ACETAMINOPHEN 650 MG: 325 TABLET ORAL at 04:39

## 2021-09-22 RX ADMIN — FLUDROCORTISONE ACETATE 0.1 MG: 0.1 TABLET ORAL at 08:53

## 2021-09-22 RX ADMIN — ENOXAPARIN SODIUM 40 MG: 40 INJECTION SUBCUTANEOUS at 08:53

## 2021-09-22 RX ADMIN — FAMOTIDINE 20 MG: 20 TABLET, FILM COATED ORAL at 08:53

## 2021-09-22 RX ADMIN — SODIUM CHLORIDE: 9 INJECTION, SOLUTION INTRAVENOUS at 07:58

## 2021-09-22 ASSESSMENT — PAIN DESCRIPTION - PROGRESSION: CLINICAL_PROGRESSION: GRADUALLY WORSENING

## 2021-09-22 ASSESSMENT — PAIN SCALES - GENERAL
PAINLEVEL_OUTOF10: 0
PAINLEVEL_OUTOF10: 5

## 2021-09-22 ASSESSMENT — PAIN DESCRIPTION - DESCRIPTORS: DESCRIPTORS: HEADACHE

## 2021-09-22 ASSESSMENT — PAIN DESCRIPTION - FREQUENCY: FREQUENCY: INTERMITTENT

## 2021-09-22 ASSESSMENT — PAIN DESCRIPTION - LOCATION: LOCATION: HEAD

## 2021-09-22 ASSESSMENT — PAIN - FUNCTIONAL ASSESSMENT: PAIN_FUNCTIONAL_ASSESSMENT: ACTIVITIES ARE NOT PREVENTED

## 2021-09-22 ASSESSMENT — PAIN DESCRIPTION - ORIENTATION: ORIENTATION: MID

## 2021-09-22 ASSESSMENT — PAIN DESCRIPTION - PAIN TYPE: TYPE: ACUTE PAIN

## 2021-09-22 ASSESSMENT — PAIN DESCRIPTION - ONSET: ONSET: ON-GOING

## 2021-09-22 NOTE — CARE COORDINATION
Left message with Frank Felipe RN, Diabetic educator, (currently unavailable until 9/23/21) to call Pt at home in regards to new diagnosis of diabetes and assistance with diabetic education. Phone number and name of Diabetic educator also given to Pt for reference as needed.     RISSA Qureshi RN

## 2021-09-22 NOTE — PROGRESS NOTES
Patient is resting in bed comfortably and has denied needs throughout the night. Patient's heart rate remained bradycardic throughout the night. Patient has been pleasant and anxiety has been at a minimum. Home medications are still locked in the cupboard. Will continue to monitor and assess.

## 2021-09-22 NOTE — PROGRESS NOTES
Ayaan Martini am scribing for and in the presence of Jamar Katz MD, F.A.C.C..    Patient: Mamie Beltrán  : 1990  Date of Admission: 2021  Primary Care Physician: Slade Griffin  Today's Date: 2021    REASON FOR CONSULTATION: Abdominal Pain (LUQ, onset last PM. Pt states she had wisdom teeth removed 3 days ago and has been taking antibiotics)      HPI: Ms. Frandy Tatum is a 36 y.o. female who presented to the emergency room because of abdominal pain and vomiting. Cardiology consulted because of bradycardia and history of syncope. Ms. Frandy Tatum reported family history of premature CAD. She has history of untreated dyslipidemia and during this admission she is diagnosed with new onset diabetes. Her hemoglobin A1c is 11%. She also gives a history very classic for obstructive sleep apnea syndrome, never been tested for sleep apnea before. I told her we have to do a home sleep study as an outpatient. She reported having recurrent syncopal episodes. History is highly suggestive of neurocardiogenic dysfunction/vasovagal syncope. Her echo done in the emergency room yesterday is very much unremarkable. She had a tilt table test done today that came back abnormal after 26 minutes into the test. Heart rate and blood pressure response were consistent with neurocardiogenic dysfunction. I had a long discussion with the patient regarding management of her current symptoms. I told her we have to treat her diabetes, start her on Florinef for neurocardiogenic dysfunction possibly adding midodrine and/or SSRIs if her symptoms presented. I sent iron protein creatinine ratio and its is normal. There is no reason for us to start her on ACE inhibitor or ARB at this point. Patient also has significant dyslipidemia, type 2 diabetes and family history of premature CAD. I told her we will start her on Lipitor 20 mg every night. Further management of her medical problems will be done as an outpatient.      She reports feeling better today. She denies any further chest pains. She will be discharged today with CAM monitor. Past Medical History:   Diagnosis Date    Allergy     seasonal    Anxiety     ASCUS with positive high risk HPV 7/16/2013    Asthma childhood    Bipolar disorder (HCC)     Bipolar disorder ([facility] [address]     Heart abnormality     HSIL on Pap smear 8/16/2012    PCOS (polycystic ovarian syndrome)     Postpartum depression     Type AB blood, Rh positive        CURRENT ALLERGIES: Patient has no known allergies. REVIEW OF SYSTEMS: 14 systems were reviewed. Pertinent positives and negatives as above, all else negative. Past Surgical History:   Procedure Laterality Date    DILATION AND CURETTAGE  age 17    miscarriage    DILATION AND CURETTAGE OF UTERUS  10/17/2018    Dr Ronnell Aldana Dye N/A 10/17/2018    DILATATION AND CURETTAGE LEEP- ECC performed by Bernadette Sr MD at [address] History:  Social History     Tobacco Use    Smoking status: Former Smoker     Types: Cigarettes    Smokeless tobacco: Never Used   Vaping Use    Vaping Use: Former    Substances: Never   Substance Use Topics    Alcohol use:  Yes     Alcohol/week: 0.0 standard drinks     Comment: socially    Drug use: Yes     Types: Marijuana        MEDICATIONS:  atorvastatin (LIPITOR) tablet 20 mg, Nightly  fludrocortisone (FLORINEF) tablet 0.1 mg, Daily  0.9 % sodium chloride infusion, Continuous  sodium chloride flush 0.9 % injection 10 mL, 2 times per day  sodium chloride flush 0.9 % injection 10 mL, PRN  0.9 % sodium chloride infusion, PRN  enoxaparin (LOVENOX) injection 40 mg, Daily  famotidine (PEPCID) tablet 20 mg, BID  acetaminophen (TYLENOL) tablet 650 mg, Q6H PRN   Or  acetaminophen (TYLENOL) suppository 650 mg, Q6H PRN  ondansetron (ZOFRAN) injection 4 mg, Q6H PRN  metFORMIN (GLUCOPHAGE-XR) extended release tablet 500 mg, BID WC  glipiZIDE (GLUCOTROL) tablet 5 mg, QAM AC  insulin lispro (HUMALOG) injection vial 0-6 Units, TID WC  insulin lispro (HUMALOG) injection vial 0-3 Units, Nightly  glucose (GLUTOSE) 40 % oral gel 15 g, PRN  dextrose 50 % IV solution, PRN  glucagon (rDNA) injection 1 mg, PRN  dextrose 5 % solution, PRN  calcium carbonate (TUMS) chewable tablet 500 mg, TID PRN        CURRENT INPATIENT MEDICATIONS:  Prior to Admission medications    Medication Sig Start Date End Date Taking? Authorizing Provider   clindamycin (CLEOCIN) 150 MG capsule Take 150 mg by mouth 4 times daily   Yes Historical Provider, MD   methylPREDNISolone (MEDROL DOSEPACK) 4 MG tablet Take 4 mg by mouth See Admin Instructions Take by mouth. Yes Historical Provider, MD   ibuprofen (ADVIL;MOTRIN) 600 MG tablet Take 1 tablet by mouth 3 times daily as needed for Pain 4/6/21  Yes Carter Borges MD   buPROPion (WELLBUTRIN XL) 300 MG extended release tablet Take 1 tablet by mouth every morning 1/22/21   Yadira Masterson MD   OXcarbazepine (TRILEPTAL) 300 MG tablet Take 1 tablet by mouth 2 times daily 1/22/21 4/22/21  Cruz Doyle MD       FAMILY HISTORY: family history includes Diabetes in her father. PHYSICAL EXAM:   /75   Pulse 54   Temp 97.6 °F (36.4 °C) (Temporal)   Resp 16   Ht 5' 5\" (1.651 m)   Wt 263 lb (119.3 kg)   LMP 09/17/2021   SpO2 94%   BMI 43.77 kg/m²  Body mass index is 43.77 kg/m². Constitutional: She is oriented to person, place, and time. She appears well-developed and well-nourished. In no acute distress. HEENT: Normocephalic and atraumatic. No JVD present. Carotid bruit is not present. No mass and no thyromegaly present. No lymphadenopathy present. Cardiovascular: Bradycardic rate, regular rhythm, normal heart sounds. Exam reveals no gallop and no friction rubs. No heart murmur heard. Pulmonary/Chest: Effort normal and breath sounds normal. No respiratory distress. She has no wheezes, rhonchi or rales. Abdominal: Soft, non-tender.  Bowel sounds and aorta are normal. She exhibits no organomegaly, mass or bruit. Extremities: No edema. No cyanosis and no clubbing. Pulses are 2+ radial and carotid pulses. 2+ dorsalis pedis and posterior tibial pulses bilaterally. Neurological: She is alert and oriented to person, place, and time. No evidence of gross cranial nerve deficit. Coordination appeared normal.   Skin: Skin is warm and dry. There is no rash or diaphoresis. Psychiatric: She has a normal mood and affect. Her speech is normal and behavior is normal.      MOST RECENT LABS ON RECORD:   Lab Results   Component Value Date    WBC 10.8 09/20/2021    HGB 14.9 09/20/2021    HCT 43.9 09/20/2021     09/20/2021    CHOL 259 (H) 09/20/2021    TRIG 394 (H) 09/20/2021    HDL 38 (L) 09/20/2021     (H) 09/20/2021    AST 78 (H) 09/20/2021     09/20/2021    K 4.2 09/20/2021     09/20/2021    CREATININE 0.62 09/20/2021    BUN 18 09/20/2021    CO2 23 09/20/2021    TSH 3.02 08/13/2018    INR 1.0 04/20/2015    LABA1C 11.0 (H) 09/20/2021    BNP 14 08/03/2013       ASSESSMENT:  Patient Active Problem List    Diagnosis Date Noted    New onset type 2 diabetes mellitus ([facility] [address] / 2021 09/21/2021    Bradycardia  likely vagal related 09/20/2021    HGSIL (high grade squamous intraepithelial lesion) on Pap smear of cervix     Headache 07/06/2015    Lower abdominal pain  completely normal workup         PLAN:  Syncope/Near Syncope   Persistent sinus bradycardia  Abnormal tilt table test consistent with neurocardiogenic dysfunction. New onset diabetes, dyslipidemia and family history of premature CAD. History suggestive of obstructive sleep apnea syndrome. Morbid obesity and marijuana abuse disorder. · History is very characteristic for vasovagal syncope. · Intermittent syncopal episodes, usually preceded by emotional stress. Patient feels the episodes coming with feeling warm, dizzy and then passing out. She never had injured herself.   · No loss of consciousness while sitting. It is always during standing. · No history to suggest seizure disorder. No tongue biting, no loss of bowel or bladder continence. No seizure-like activities. · Continue good hydration. · She has classic history of obstructive sleep apnea syndrome and she will need home sleep study for further evaluation. · Echo reviewed with the patient, structurally normal heart. · Mildly abnormal tilt table test as outlined above consistent with neurocardiogenic dysfunction. · Started on Florinef 0.1 mg daily. · Started on oral antidiabetic therapy and Lipitor 20 mg daily. · She can be discharged on CAM monitor today. · Nonpharmacologic counseling: Because of her condition, I reminded her to try and keep herself well-hydrated and to take extra time when moving from laying to sitting, sitting to standing and standing to walking. I also explained to her to help improve her symptoms she should include 3 g sodium diet, 1 or 2 L of sports drinks daily, Knee-high compressions stockings and regular exercise. I also told her to practice, learn, and regularly implement vasovagal abortive counter pressure counter measure maneuvers which are simply isometric exercises of the upper and lower extremities, called leg crossing and arm tensing, which are to be performed on a regular basis and practice and applied during the her syncopal episodes.  Suspected Obstructive Sleep Apnea: Given Ms. Phoenix's symptoms of daytime tiredness and not waking up rested in the morning, I advised her to consider undergoing a sleep apnea screening which she agreed to do. Therefore I ordered a Apnea link home screening monitor for her. Morbid obesity: Body mass index is 43.77 kg/m². I also briefly discussed both diet and exercise strategies for her to continue to loses weight and she was very receptive to this. · History of marijuana abuse. · Counseled patient regarding substance abuse disorder.   She is very receptive and she is actively trying to quit using marijuana. · Diabetes  · Continue current antidiabetic medications as per primary team.  · Continue Lipitor 20 mg every night. Once again, thank you for allowing me to participate in this patients care. Please do not hesitate to contact me if I could be of any further assistance. Sincerely,  Zhou Rodriguez PA-C  [facility] Cardiology Specialist    [address] [facility], [address] [place] [address]  Phone: [phone], Fax: [phone]     I believe that the risk of significant morbidity and mortality related to the patient's current medical conditions are: Intermediate. The documentation recorded by the scribe, accurately and completely reflects the services I personally performed and the decisions made by me. Patti Josue MD, F.A.C.C.  September 22, 2021

## 2021-09-22 NOTE — PROCEDURES
[address]                                TILT TABLE TEST    PATIENT NAME: Debbie Gonzáles                   :        1990  MED REC NO:   [id number]                              ROOM:       0335  ACCOUNT NO:   [de-identified]                           ADMIT DATE: 2021  PROVIDER:     Manolo Zimmerman    Cardiovascular Diagnostics Department    DATE OF PROCEDURE:  2021    ORDERING PROVIDER:  [facility] MIGUE Mitchell    PRIMARY CARE PROVIDER:  Elver Porras MD    INTERPRETING PHYSICIAN:  Manolo Zimmerman MD     Diagnosis:  Lightheadedness, dizziness, chest pain. PROCEDURE SUMMARY:  After explaining the risk, benefits and alternatives  to the procedure, informed written consent was obtained. The patient  was brought to the tilt table laboratory in a fasting and resting state. The patient was placed on the tilt table in the supine position, ECG  patches were applied and an IV was placed. The patient's baseline blood  pressure was 116/72 mmHg with a heart rate of 53/minute. The patient  was then raised to the 70 degree head upright tilt position with and  pulse rate, blood pressure and cardiac rhythm were monitored and  recorded each minute of the study for a maximum of 30 minutes. During the initial 20 minutes of the study, the patient's blood pressure  ranged from a high of 118/71 mmHg to a low of 97/66 mmHg, while their  heart rate ranged from a low of 55/minute to a high of 67/minute. During this period, the patient reported no symptoms. During the last 10 minutes of the study, nitroglycerin 0.3 mg was give  sublingually. During this time, the patient's blood pressure ranged  from a high of 125/67 mmHg to a low of 79/46 mmHg, while their heart  rate ranged from a low of 70/minute to a high of 102/minute.   During  this period, the patient reported severe lightheadedness, chest  tightness, anxious, headache, and chest pain 10/10. At this point, the patient was returned to the supine position and  monitored for an additional ten minutes. Once the patient felt well  enough to be discharged home, they were discharged home with  instructions to follow up with their primary care physician and/or  cardiologist as previously scheduled. STUDY CONCLUSIONS:  Abnormal head upright tilt table study. The patient's heart rate, blood  pressure response and symptoms were most consistent with  neurocardiogenic dysfunction. Combined with vigilant maintenance of  euvolemia and maintaining a moderate salt intake, pharmacologic  treatment with Florinef and/or a Serotonin Selective Reuptake Inhibitor  (SSRI) such as Lexapro among other treatments have shown some  effectiveness in the treatment of this condition.         Cordell Casas    D: 09/22/2021 13:29:28       T: 09/22/2021 13:34:33     RENAN  Job#: [id number]     Doc#: Unknown    CC:  EVERETT Cronin

## 2021-09-22 NOTE — PROGRESS NOTES
Patient given tylenol for headache pain. Denies chest pain or other needs. New gown and towels are in the bathroom for patient to wash up when she is ready. Will continue to monitor and assess.

## 2021-09-22 NOTE — DISCHARGE INSTR - DIET

## 2021-09-22 NOTE — PROGRESS NOTES
Progress Note    SUBJECTIVE:    Patient seen for f/u of Bradycardia. She resting in bed alert and oriented in no acute distress. Denies any further CP. Denies dizziness or syncope. Afebrile. Tolerating diet and activity well. Patient has been instructed on the need to get her DM under control. ROS:   Constitutional: negative  for fevers, and negative for chills.   Respiratory: negative for shortness of breath, negative for cough, and negative for wheezing  Cardiovascular: negative for chest pain, and negative for palpitations  Gastrointestinal: negative for abdominal pain, negative for nausea,negative for vomiting, negative for diarrhea, and negative for constipation     All other systems were reviewed with the patient and are negative unless otherwise stated in HPI      OBJECTIVE:      Vitals:   Vitals:    09/22/21 0715   BP: 117/75   Pulse: 54   Resp: 16   Temp: 97.6 °F (36.4 °C)   SpO2: 94%     Weight: 263 lb (119.3 kg)   Height: 5' 5\" (165.1 cm)   -----------------------------------------------------------------  Exam:    CONSTITUTIONAL:  awake, alert, cooperative, no apparent distress, and appears stated age  EYES:  Lids and lashes normal, pupils equal, round and reactive to light, extra ocular muscles intact, sclera clear, conjunctiva normal  ENT:  normocepalic, without obvious abnormality, atraumatic  NECK:  supple, symmetrical, trachea midline, skin normal and no stridor  HEMATOLOGIC/LYMPHATICS:  no cervical lymphadenopathy and no supraclavicular lymphadenopathy  LUNGS:  No increased work of breathing, good air exchange, clear to auscultation bilaterally, no crackles or wheezing  CARDIOVASCULAR:  Normal apical impulse, regular rate and rhythm, normal S1 and S2, no S3 or S4, and no murmur noted  ABDOMEN:  No scars, normal bowel sounds, soft, non-distended, non-tender, no masses palpated, no hepatosplenomegally  MUSCULOSKELETAL:  there is no redness, warmth, or swelling of the joints  full range of motion noted  NEUROLOGIC:  REYNOSO, follows commands, no deficits  SKIN:  no bruising or bleeding, normal skin color, texture, turgor, no redness, warmth, or swelling and no rashes  EXT:     no cyanosis, clubbing or edema present    -----------------------------------------------------------------    Diagnostic Data:    · All available data reviewed  Lab Results   Component Value Date    WBC 10.8 09/20/2021    HGB 14.9 09/20/2021    MCV 91.8 09/20/2021     09/20/2021      Lab Results   Component Value Date    GLUCOSE 285 (H) 09/20/2021    BUN 18 09/20/2021    CREATININE 0.62 09/20/2021     09/20/2021    K 4.2 09/20/2021    CALCIUM 9.5 09/20/2021     09/20/2021    CO2 23 09/20/2021       CT ABDOMEN PELVIS W IV CONTRAST Additional Contrast? None   Final Result   1. No acute abnormality identified. 2.  Hepatomegaly with steatosis.              ASSESSMENT / PLAN:  Bradycardia  · Continue current therapy  · Appreciate Dr. Kat Cordoba  · Tilt Table -- NCS  · Continue Florinef, Lipitor  · CAM monitor x 1 week  · Type 2 DM  · POCT AC and HS  · Low dose sliding scale  · Hypoglycemia protocol  · Continue Glucophage XR, Glucotrol  · Nutrition status: morbid obesity: dietician consult initiated during hositalization  · DVT prophylaxis: Lovenox   · High risk medications: none   · Disposition:  Discharge plan is home today      Jena Mack APRN - CNP , APRN, NP-C

## 2021-09-22 NOTE — DISCHARGE SUMMARY
wheezing  CARDIOVASCULAR:  Normal apical impulse, regular rate and rhythm, normal S1 and S2, no S3 or S4, and no murmur noted  ABDOMEN:  No scars, normal bowel sounds, soft, non-distended, non-tender, no masses palpated, no hepatosplenomegally  MUSCULOSKELETAL:  there is no redness, warmth, or swelling of the joints  full range of motion noted  NEUROLOGIC:  REYNOSO, follows commands, no deficits  SKIN:  no bruising or bleeding, normal skin color, texture, turgor, no redness, warmth, or swelling and no rashes  EXT:      no cyanosis, clubbing or edema present    Significant Diagnostic Studies:   Lab Results   Component Value Date    WBC 10.8 09/20/2021    HGB 14.9 09/20/2021     09/20/2021       Lab Results   Component Value Date    BUN 18 09/20/2021    CREATININE 0.62 09/20/2021     09/20/2021    K 4.2 09/20/2021    CALCIUM 9.5 09/20/2021     09/20/2021    CO2 23 09/20/2021    LABGLOM >60 09/20/2021       Lab Results   Component Value Date    WBCUA 0 TO 2 09/20/2021    RBCUA None 09/20/2021    EPITHUA 0 TO 2 09/20/2021    LEUKOCYTESUR NEGATIVE 09/20/2021    SPECGRAV 1.025 (H) 09/20/2021    GLUCOSEU 3+ (A) 09/20/2021    KETUA 1+ (A) 09/20/2021    PROTEINU NEGATIVE 09/20/2021    HGBUR TRACE (A) 09/20/2021    CASTUA NOT REPORTED 09/20/2021    CRYSTUA NOT REPORTED 09/20/2021    BACTERIA NOT REPORTED 09/20/2021    YEAST NOT REPORTED 09/20/2021       No results found.     Assessment and Plan:  Patient Active Problem List    Diagnosis Date Noted    Neurocardiogenic syncope 09/22/2021    New onset type 2 diabetes mellitus ([facility] [address] /  2021 09/21/2021    Bradycardia  likely vagal related 09/20/2021    HGSIL (high grade squamous intraepithelial lesion) on Pap smear of cervix     Headache 07/06/2015    Lower abdominal pain  completely normal workup         Discharge Medications:       Gordon Spears   Home Medication Instructions [id number]    Printed on:09/22/21 1124   Medication Information atorvastatin (LIPITOR) 20 MG tablet  Take 1 tablet by mouth nightly             buPROPion (WELLBUTRIN XL) 300 MG extended release tablet  Take 1 tablet by mouth every morning             fludrocortisone (FLORINEF) 0.1 MG tablet  Take 1 tablet by mouth daily             glipiZIDE (GLUCOTROL) 5 MG tablet  Take 1 tablet by mouth every morning (before breakfast)             ibuprofen (ADVIL;MOTRIN) 600 MG tablet  Take 1 tablet by mouth 3 times daily as needed for Pain             metFORMIN (GLUCOPHAGE-XR) 500 MG extended release tablet  Take 1 tablet by mouth 2 times daily (with meals)             OXcarbazepine (TRILEPTAL) 300 MG tablet  Take 1 tablet by mouth 2 times daily                 Patient Instructions:    Activity: activity as tolerated  Diet: diabetic diet and low fat, low cholesterol diet  Wound Care: none needed  Other: None     Disposition:   Discharge to Home    Follow up:  Patient will be followed by Benjy Weiner MD in 1-2 weeks; Dr Cole Yan in 2 weeks; Diabetic educator    CORE MEASURES on Discharge (if applicable)  ACE/ARB in CHF: NA  Statin in MI: NA  ASA in MI: NA  Statin in CVA: NA  Antiplatelet in CVA: NA    Total time spent on discharge services: 40 minutes    Including the following activities:  Evaluation and Management of patient  Discussion with patient and/or surrogate about current care plan  Coordination with Case Management and/or   Coordination of care with Consultants (if applicable)   Coordination of care with Receiving Facility Physician (if applicable)  Completion of DME forms (if applicable)  Preparation of Discharge Summary  Preparation of Medication Reconciliation  Preparation of Discharge Prescriptions    Signed:  NOREEN Richey CNP, NOREEN, NP-C  9/22/2021, 11:24 AM

## 2021-09-22 NOTE — PROGRESS NOTES
Writer at bedside for shift assessment. Patient lying in bed, respirations are even and unlabored while on room air. Vitals obtained and morning assessment completed, see flowsheet for details. Pt heart was regular but bradycardic.  0700 medication given, pt denies pain and denies further needs at this time. Call light in reach, will continue to monitor.

## 2021-09-22 NOTE — PROGRESS NOTES
Met with Patient this a.m. to discuss discharge planning. Patient is a 36year old white female admitted with a diagnosis of Bradycardia likely vagal related. Patient is noted to have history of Anxiety and Bi polar depression. Patient is alert and oriented, polite and cooperative with this assessment. States that she wishes to be discharged home with her family when stable. Patient resides in [place] with her boyfriend and 4 children ages 8, 8, 9 and 10. Patient uses no DME and currently has no outside services in place. She works from home for [employer]. Patient is independent with her ADL's. Manages her own household. Patient drives herself and provides for her own transportation needs. PCP is Dr. Brian Rapp. Patient has [place] [employer] and denies needing any further assistance with the cost of her medications. Discharge plan for Patient is home. No additional services requested or indicated by Patient at this time. She is a 'Full Code' status and is considering implementing Advanced Directive documents. LSW to remain involved and offer assistance if/as needs arise.     [facility]  9/22/2021

## 2021-09-22 NOTE — PROGRESS NOTES
Comprehensive Nutrition Assessment    Type and Reason for Visit:  Initial, Patient Education    Nutrition Recommendations/Plan:  O/p education for diabetes    Nutrition Assessment:  Food and nutrition related knowledge deficit r/t endocrine dysfunction, AEB NODM. Admitted for bradycardia, with NODM discovered. Will provide with basic carbohydrate counting and label reading education, and recommend o/p follow up    Malnutrition Assessment:  Malnutrition Status: At risk for malnutrition (Comment)    Context:  Acute Illness     Findings of the 6 clinical characteristics of malnutrition:  Energy Intake:  No significant decrease in energy intake  Weight Loss:  No significant weight loss     Body Fat Loss:  No significant body fat loss     Muscle Mass Loss:  No significant muscle mass loss    Fluid Accumulation:  1 - Mild Generalized   Strength:  Not Performed    Estimated Daily Nutrient Needs:  Energy (kcal):  7481-0706 (11-15); Weight Used for Energy Requirements:  Current     Protein (g):  68-74 (1.2-1.3); Weight Used for Protein Requirements:  Ideal        Fluid (ml/day):  1800+; Method Used for Fluid Requirements:  1 ml/kcal      Nutrition Related Findings:  obese      Wounds:  None       Current Nutrition Therapies:    ADULT DIET;  Regular; 4 carb choices (60 gm/meal)    Anthropometric Measures:  · Height: 5' 5\" (165.1 cm)  · Current Body Weight: 263 lb (119.3 kg)   · Admission Body Weight: 265 lb (120.2 kg)    · Usual Body Weight: 260 lb (117.9 kg)     · Ideal Body Weight: 125 lbs; % Ideal Body Weight 210.4 %   · BMI: 43.8  · Adjusted Body Weight:  ; No Adjustment   · BMI Categories: Obese Class 3 (BMI 40.0 or greater)       Nutrition Diagnosis:   · Food & Nutrition-related knowledge deficit related to endocrine dysfuntion as evidenced by other (comment) (new onset DM)      Lab Results   Component Value Date     09/20/2021    K 4.2 09/20/2021     09/20/2021    CO2 23 09/20/2021    BUN 18 09/20/2021 CREATININE 0.62 09/20/2021    GLUCOSE 285 (H) 09/20/2021    CALCIUM 9.5 09/20/2021    PROT 7.7 09/20/2021    LABALBU 4.7 09/20/2021    BILITOT 0.36 09/20/2021    ALKPHOS 74 09/20/2021    AST 78 (H) 09/20/2021     (H) 09/20/2021    LABGLOM >60 09/20/2021    GFRAA >60 09/20/2021     Lab Results   Component Value Date    LABA1C 11.0 (H) 09/20/2021     Lab Results   Component Value Date     09/20/2021     No results found for: VITD25    Nutrition Interventions:   Food and/or Nutrient Delivery:  Continue Current Diet  Nutrition Education/Counseling:  Education initiated   Coordination of Nutrition Care:  Continue to monitor while inpatient    Goals:  PO >75% meals with appropriate carb choices       Nutrition Monitoring and Evaluation:   Behavioral-Environmental Outcomes:      Food/Nutrient Intake Outcomes:  Food and Nutrient Intake  Physical Signs/Symptoms Outcomes:  Biochemical Data, Weight     Discharge Planning:    Recommend pursue outpatient diabetes education     Electronically signed by Mirza Hargrove RD, LD on 9/22/21 at 7:36 AM EDT    Contact: [phone]

## 2021-09-22 NOTE — PROCEDURES
[address]                                TILT TABLE TEST    PATIENT NAME: Kali Atkins                   :        1990  MED REC NO:   [id number]                              ROOM:       0335  ACCOUNT NO:   [de-identified]                           ADMIT DATE: 2021  PROVIDER:     Roberto Cha    Cardiovascular Diagnostics Department    DATE OF PROCEDURE:  2021    ORDERING PROVIDER:  Evertt Skipper L. Veda Cowden    PRIMARY CARE PROVIDER:  Dean Biggs MD    INTERPRETING PHYSICIAN:  Roberto Cha MD     Diagnosis:  Lightheadedness, dizziness, chest pain. PROCEDURE SUMMARY:  After explaining the risk, benefits and alternatives  to the procedure, informed written consent was obtained. The patient  was brought to the tilt table laboratory in a fasting and resting state. The patient was placed on the tilt table in the supine position, ECG  patches were applied and an IV was placed. The patient's baseline blood  pressure was 116/72 mmHg with a heart rate of 53/minute. The patient  was then raised to the 70 degree head upright tilt position with and  pulse rate, blood pressure and cardiac rhythm were monitored and  recorded each minute of the study for a maximum of 30 minutes. During the initial 20 minutes of the study, the patient's blood pressure  ranged from a high of 118/71 mmHg to a low of 97/66 mmHg, while their  heart rate ranged from a low of 55/minute to a high of 67/minute. During this period, the patient reported no symptoms. During the last 10 minutes of the study, nitroglycerin 0.3 mg was give  sublingually. During this time, the patient's blood pressure ranged  from a high of 125/67 mmHg to a low of 79/46 mmHg, while their heart  rate ranged from a low of 70/minute to a high of 102/minute.   During  this period, the patient reported severe lightheadedness, chest  tightness, anxious, headache, chest pain (10/10). At this point, the patient was returned to the supine position and  monitored for an additional ten minutes. Once the patient felt well  enough to be discharged home, they were discharged home with  instructions to follow up with their primary care physician and/or  cardiologist as previously scheduled. STUDY CONCLUSIONS:  Abnormal head upright tilt table study. The patient's heart rate, blood  pressure response and symptoms were most consistent with  neurocardiogenic dysfunction. Combined with vigilant maintenance of  euvolemia and maintaining a moderate salt intake, pharmacologic  treatment with Florinef and/or a Serotonin Selective Reuptake Inhibitor  (SSRI) such as Lexapro among other treatments have shown some  effectiveness in the treatment of this condition.         Chiquis Nance    D: 09/21/2021 10:18:23       T: 09/22/2021 10:19:45     [email or web address]  Job#: [id number]     Doc#: Unknown    CC:  Vera Liu

## 2021-09-23 ENCOUNTER — HOSPITAL ENCOUNTER (OUTPATIENT)
Dept: DIABETES SERVICES | Age: 31
Setting detail: THERAPIES SERIES
Discharge: HOME | End: 2021-09-23
Payer: MEDICARE

## 2021-09-23 PROCEDURE — G0108 DIAB MANAGE TRN  PER INDIV: HCPCS

## 2021-09-23 SDOH — ECONOMIC STABILITY: FOOD INSECURITY: ADDITIONAL INFORMATION: NO

## 2021-09-30 NOTE — PROCEDURES
[address] [facility] [place] [address]                                 EVENT MONITOR    PATIENT NAME: Brigitte Mejia                   :        1990  MED REC NO:   [id number]                              ROOM:       0335  ACCOUNT NO:   [de-identified]                           ADMIT DATE: 2021  PROVIDER:     Abilio Eaton    CARDIOVASCULAR DIAGNOSTIC DEPARTMENT    DATE OF STUDY:  2021    ORDERING PROVIDER:  Coreen Rene. Nickie Mcfarland MD    PRIMARY CARE PROVIDER:  Neo Villafuerte MD    INTERPRETING PHYSICIAN: Coreen Rene. Nickie Mcfarland MD    DIAGNOSIS:  Other specified cardiac arrhythmias. Bradycardia,  unspecified. PHYSICIAN INTERPRETATION:  Recording Length: 4 days and 2 hours recorded. Findings Summary  1. Baseline rhythm is sinus with average heart rate of 71 bpm, ranging  between 41 and 141 bpm.  2. Sinus tachycardia represented 7% of the study duration. Rare  premature atrial contractions with short runs of ectopic atrial rhythm. 3. No significant ventricular arrhythmia. 4. Three patient activated events recorded correlated with normal sinus  rhythm and sinus bradycardia.           Dylan Gong    D: 2021 9:46:03       T: 2021 9:47:47     KELSEA/VIK  Job#: [id number]     Doc#: Unknown    CC:  Devon Doherty

## 2021-10-05 ENCOUNTER — TELEPHONE (OUTPATIENT)
Dept: CARDIOLOGY | Age: 31
End: 2021-10-05

## 2021-10-05 NOTE — TELEPHONE ENCOUNTER
----- Message from Yang Hammond MD sent at 10/4/2021  9:16 PM EDT -----  Heart monitor is good. Continue current therapy and follow-up. Please call with questions and/or concerns.   Thank you

## 2021-10-08 ENCOUNTER — OFFICE VISIT (OUTPATIENT)
Dept: CARDIOLOGY | Age: 31
End: 2021-10-08
Payer: MEDICARE

## 2021-10-08 VITALS
BODY MASS INDEX: 42.42 KG/M2 | WEIGHT: 254.6 LBS | OXYGEN SATURATION: 98 % | HEIGHT: 65 IN | HEART RATE: 54 BPM | SYSTOLIC BLOOD PRESSURE: 113 MMHG | RESPIRATION RATE: 16 BRPM | DIASTOLIC BLOOD PRESSURE: 74 MMHG

## 2021-10-08 DIAGNOSIS — R94.09 ABNORMAL TILT TABLE TEST: ICD-10-CM

## 2021-10-08 DIAGNOSIS — E78.5 DYSLIPIDEMIA: ICD-10-CM

## 2021-10-08 DIAGNOSIS — G47.33 OSA (OBSTRUCTIVE SLEEP APNEA): ICD-10-CM

## 2021-10-08 DIAGNOSIS — Z87.898 HISTORY OF SYNCOPE: Primary | ICD-10-CM

## 2021-10-08 PROCEDURE — G8417 CALC BMI ABV UP PARAM F/U: HCPCS | Performed by: INTERNAL MEDICINE

## 2021-10-08 PROCEDURE — 1111F DSCHRG MED/CURRENT MED MERGE: CPT | Performed by: INTERNAL MEDICINE

## 2021-10-08 PROCEDURE — 1036F TOBACCO NON-USER: CPT | Performed by: INTERNAL MEDICINE

## 2021-10-08 PROCEDURE — G8484 FLU IMMUNIZE NO ADMIN: HCPCS | Performed by: INTERNAL MEDICINE

## 2021-10-08 PROCEDURE — 99214 OFFICE O/P EST MOD 30 MIN: CPT | Performed by: INTERNAL MEDICINE

## 2021-10-08 PROCEDURE — G8427 DOCREV CUR MEDS BY ELIG CLIN: HCPCS | Performed by: INTERNAL MEDICINE

## 2021-10-08 NOTE — PROGRESS NOTES
Patient: Balbina Bailey  : 1990  Primary Care Physician: Dandre Braga  Today's Date: 10/8/2021    REASON FOR CONSULTATION: Follow-Up from Hospital (2 week follow up. Tilt on  & CAM on . She has been doing okay. A little CP, sharp - lasts maybe a few minutes. Just comes and goes. Palpitations maybe 2 -3 times a day, just depends. Denies: SOB, Lightheaded/dizzines. )      HPI: Ms. Irena Braga is a 36 y.o. female who presented to the emergency room on 2021 because of abdominal pain and vomiting. Cardiology consulted because of bradycardia and history of syncope. Ms. Irena Braga reported family history of premature CAD. She has history of untreated dyslipidemia and during this admission she is diagnosed with new onset diabetes. Her hemoglobin A1c on admission was 11%. She did not know that she was diabetic from before. She also gives a history very classic for obstructive sleep apnea syndrome, never been tested for sleep apnea before. I told her we have to do a home sleep study as an outpatient. She reported having recurrent syncopal episodes. History is highly suggestive of neurocardiogenic dysfunction/vasovagal syncope. Her echo done in the emergency room on 2021 that was very much unremarkable. She was discharged with a Cam monitor:   1. Baseline rhythm is sinus with average heart rate of 71 bpm, ranging between 41 and 141 bpm.  2. Sinus tachycardia represented 7% of the study duration. Rare premature atrial contractions with short runs of ectopic atrial rhythm. 3. No significant ventricular arrhythmia. 4. Three patient activated events recorded correlated with normal sinus rhythm and sinus bradycardia. She is here today for follow-up. Doing really well overall. She is tolerating her antidiabetic medications very well. She is also taking Lipitor 20 mg daily because of dyslipidemia. She has no further dizziness or lightheadedness. No further passing out episodes.   She is complaining of occasional chest twinges. Usually sharp. Happening 2-3 times a day but they do not last longer all. No relation to exertion. No significant radiation. She started to exercise and watch her diet and she said her blood sugar is getting better. Home sleep study still pending. Past Medical History:   Diagnosis Date    Allergy     seasonal    Anxiety     ASCUS with positive high risk HPV 7/16/2013    Asthma childhood    Bipolar disorder (HCC)     Bipolar disorder ([address]     Heart abnormality     HSIL on Pap smear 8/16/2012    PCOS (polycystic ovarian syndrome)     Postpartum depression     Type AB blood, Rh positive        CURRENT ALLERGIES: Patient has no known allergies. REVIEW OF SYSTEMS: 14 systems were reviewed. Pertinent positives and negatives as above, all else negative. Past Surgical History:   Procedure Laterality Date    DILATION AND CURETTAGE  age 17    miscarriage    DILATION AND CURETTAGE OF UTERUS  10/17/2018    Dr Paddy Martini Render N/A 10/17/2018    DILATATION AND CURETTAGE LEEP- ECC performed by Jenny Rahman MD at [address] History:  Social History     Tobacco Use    Smoking status: Former Smoker     Types: Cigarettes    Smokeless tobacco: Never Used   Vaping Use    Vaping Use: Former    Substances: Never   Substance Use Topics    Alcohol use: Yes     Alcohol/week: 0.0 standard drinks     Comment: socially    Drug use: Yes     Types: Marijuana        MEDICATIONS:  No current facility-administered medications for this visit. CURRENT INPATIENT MEDICATIONS:  Prior to Admission medications    Medication Sig Start Date End Date Taking?  Authorizing Provider   glucose monitoring (FREESTYLE FREEDOM) kit 1 kit by Does not apply route daily 9/24/21  Yes Kim Rg MD   blood glucose monitor strips Use to test blood sugar qd and prn 9/24/21  Yes Kim Rg MD   Lancets MISC Use one daily and prn to test sugar 9/24/21  Yes Lazarus Rubenstein, MD   glipiZIDE (GLUCOTROL) 5 MG tablet Take 1 tablet by mouth every morning (before breakfast) 9/23/21  Yes NOREEN Abad CNP   metFORMIN (GLUCOPHAGE-XR) 500 MG extended release tablet Take 1 tablet by mouth 2 times daily (with meals) 9/22/21  Yes NOREEN Abad CNP   atorvastatin (LIPITOR) 20 MG tablet Take 1 tablet by mouth nightly 9/22/21  Yes NOREEN Abad CNP   CVS Lancets MISC 1 each by Does not apply route 4 times daily 9/22/21  Yes NOREEN Abad CNP   ibuprofen (ADVIL;MOTRIN) 600 MG tablet Take 1 tablet by mouth 3 times daily as needed for Pain 4/6/21  Yes Ellie Hoyos MD       FAMILY HISTORY: family history includes Diabetes in her father. PHYSICAL EXAM:   /74 (Site: Left Upper Arm, Position: Sitting, Cuff Size: Large Adult)   Pulse 54   Resp 16   Ht 5' 5\" (1.651 m)   Wt 254 lb 9.6 oz (115.5 kg)   LMP 09/17/2021   SpO2 98%   BMI 42.37 kg/m²  Body mass index is 42.37 kg/m². Constitutional: She is oriented to person, place, and time. She appears well-developed and well-nourished. In no acute distress. HEENT: Normocephalic and atraumatic. No JVD present. Carotid bruit is not present. No mass and no thyromegaly present. No lymphadenopathy present. Cardiovascular: Bradycardic rate, regular rhythm, normal heart sounds. Exam reveals no gallop and no friction rubs. No heart murmur heard. Pulmonary/Chest: Effort normal and breath sounds normal. No respiratory distress. She has no wheezes, rhonchi or rales. Abdominal: Soft, non-tender. Bowel sounds and aorta are normal. She exhibits no organomegaly, mass or bruit. Extremities: No edema. No cyanosis and no clubbing. Pulses are 2+ radial and carotid pulses. 2+ dorsalis pedis and posterior tibial pulses bilaterally. Neurological: She is alert and oriented to person, place, and time. No evidence of gross cranial nerve deficit.  Coordination appeared normal. Skin: Skin is warm and dry. There is no rash or diaphoresis. Psychiatric: She has a normal mood and affect. Her speech is normal and behavior is normal.      MOST RECENT LABS ON RECORD:   Lab Results   Component Value Date    WBC 10.8 09/20/2021    HGB 14.9 09/20/2021    HCT 43.9 09/20/2021     09/20/2021    CHOL 259 (H) 09/20/2021    TRIG 394 (H) 09/20/2021    HDL 38 (L) 09/20/2021     (H) 09/20/2021    AST 78 (H) 09/20/2021     09/20/2021    K 4.2 09/20/2021     09/20/2021    CREATININE 0.62 09/20/2021    BUN 18 09/20/2021    CO2 23 09/20/2021    TSH 3.02 08/13/2018    INR 1.0 04/20/2015    LABA1C 11.0 (H) 09/20/2021    BNP 14 08/03/2013       ASSESSMENT:  Patient Active Problem List    Diagnosis Date Noted    Neurocardiogenic syncope 09/22/2021    New onset type 2 diabetes mellitus ([facility] [address] /  2021 09/21/2021    Bradycardia  likely vagal related 09/20/2021    HGSIL (high grade squamous intraepithelial lesion) on Pap smear of cervix     Headache 07/06/2015    Lower abdominal pain  completely normal workup         PLAN:  Syncope/Near Syncope: No further episodes since discharge from the hospital.  Persistent sinus bradycardia: Can monitor reviewed with the patient. No significant heart rate or rhythm abnormalities detected. Abnormal tilt table test consistent with neurocardiogenic dysfunction. New onset diabetes, dyslipidemia and family history of premature CAD. History suggestive of obstructive sleep apnea syndrome. Morbid obesity and marijuana abuse disorder. · History is very characteristic for vasovagal syncope. · Intermittent syncopal episodes, usually preceded by emotional stress. Patient feels the episodes coming with feeling warm, dizzy and then passing out. She never had injured herself. · No loss of consciousness while sitting. It is always during standing. · No history to suggest seizure disorder. No tongue biting, no loss of bowel or bladder continence.   No seizure-like activities. · Continue good hydration. · She has classic history of obstructive sleep apnea syndrome: Sleep study ordered. · Echo reviewed with the patient, structurally normal heart. · Mildly abnormal tilt table test as outlined above consistent with neurocardiogenic dysfunction. · Giving no further syncopal episodes, no significant dizziness or lightheadedness and no significant bradycardia on CAM monitor. I told her there is no indication for drug therapy for her bradycardia or her neurocardiogenic dysfunction particularly if she is able to maintain adequate hydration and to get her diabetes under control. · Started on oral antidiabetic therapy and Lipitor 20 mg daily. · Nonpharmacologic counseling: Because of her condition, I reminded her to try and keep herself well-hydrated and to take extra time when moving from laying to sitting, sitting to standing and standing to walking. I also explained to her to help improve her symptoms she should include 3 g sodium diet, 1 or 2 L of sports drinks daily, Knee-high compressions stockings and regular exercise. I also told her to practice, learn, and regularly implement vasovagal abortive counter pressure counter measure maneuvers which are simply isometric exercises of the upper and lower extremities, called leg crossing and arm tensing, which are to be performed on a regular basis and practice and applied during the her syncopal episodes.  Suspected Obstructive Sleep Apnea: Classic history of obstructive sleep apnea syndrome. History of snoring and waking up choking in the middle of the night with daytime fatigue. Sleep study ordered. Morbid obesity: Body mass index is 42.37 kg/m². I also briefly discussed both diet and exercise strategies for her to continue to loses weight and she was very receptive to this. · History of marijuana abuse. · Counseled patient regarding substance abuse disorder.   She is very receptive and she is actively trying to quit using marijuana. · Diabetes  · Continue current antidiabetic medications as per primary team.  · Continue Lipitor 20 mg every night. Once again, thank you for allowing me to participate in this patients care. Please do not hesitate to contact me if I could be of any further assistance. Sincerely,  Saleem Adan PA-C  [facility] Cardiology Specialist    [address], [address] [facility]  Phone: [phone], Fax: [phone]     I believe that the risk of significant morbidity and mortality related to the patient's current medical conditions are: Intermediate. The documentation recorded by the scribe, accurately and completely reflects the services I personally performed and the decisions made by me. Meryl Bhagat MD, F.A.C.C.  October 8, 2021

## 2021-10-08 NOTE — PATIENT INSTRUCTIONS
----- Message from Katelyn Fletcher sent at 1/19/2017  1:17 PM CST -----  Contact: [phone]/ self   Pt its requesting an initial ob appointment for this month. Please advise    SURVEY:    You may be receiving a survey from [employer] regarding your visit today. Please complete the survey to enable us to provide the highest quality of care to you and your family. If you cannot score us a very good on any question, please call the office to discuss how we could have made your experience a very good one. Thank you.

## 2021-11-24 ENCOUNTER — HOSPITAL ENCOUNTER (EMERGENCY)
Age: 31
Discharge: HOME | End: 2021-11-24
Payer: MEDICARE

## 2021-11-24 VITALS
SYSTOLIC BLOOD PRESSURE: 118 MMHG | HEART RATE: 66 BPM | BODY MASS INDEX: 43.27 KG/M2 | WEIGHT: 260 LBS | TEMPERATURE: 98.3 F | RESPIRATION RATE: 16 BRPM | OXYGEN SATURATION: 99 % | DIASTOLIC BLOOD PRESSURE: 67 MMHG

## 2021-11-24 DIAGNOSIS — J06.9 ACUTE UPPER RESPIRATORY INFECTION: Primary | ICD-10-CM

## 2021-11-24 LAB
DIRECT EXAM: NORMAL
Lab: NORMAL
SARS-COV-2, RAPID: NOT DETECTED
SPECIMEN DESCRIPTION: NORMAL
SPECIMEN DESCRIPTION: NORMAL

## 2021-11-24 PROCEDURE — C9803 HOPD COVID-19 SPEC COLLECT: HCPCS

## 2021-11-24 PROCEDURE — 6360000002 HC RX W HCPCS: Performed by: PHYSICIAN ASSISTANT

## 2021-11-24 PROCEDURE — 87635 SARS-COV-2 COVID-19 AMP PRB: CPT

## 2021-11-24 PROCEDURE — 87804 INFLUENZA ASSAY W/OPTIC: CPT

## 2021-11-24 PROCEDURE — 99283 EMERGENCY DEPT VISIT LOW MDM: CPT

## 2021-11-24 RX ORDER — DEXAMETHASONE SODIUM PHOSPHATE 10 MG/ML
10 INJECTION INTRAMUSCULAR; INTRAVENOUS ONCE
Status: COMPLETED | OUTPATIENT
Start: 2021-11-24 | End: 2021-11-24

## 2021-11-24 RX ADMIN — DEXAMETHASONE SODIUM PHOSPHATE 10 MG: 10 INJECTION INTRAMUSCULAR; INTRAVENOUS at 13:25

## 2021-11-24 ASSESSMENT — ENCOUNTER SYMPTOMS
SINUS PAIN: 1
EYES NEGATIVE: 1
COUGH: 1
GASTROINTESTINAL NEGATIVE: 1
SHORTNESS OF BREATH: 0
SORE THROAT: 1

## 2021-11-24 NOTE — ED PROVIDER NOTES
(polycystic ovarian syndrome)     Postpartum depression     Type AB blood, Rh positive          SURGICALHISTORY       Past Surgical History:   Procedure Laterality Date    DILATION AND CURETTAGE  age 17    miscarriage    DILATION AND CURETTAGE OF UTERUS  10/17/2018    Dr Bella LOMELI N/A 10/17/2018    DILATATION AND CURETTAGE LEEP- ECC performed by Noris Gentile MD at [facility]       Previous Medications    ATORVASTATIN (LIPITOR) 20 MG TABLET    Take 1 tablet by mouth nightly    BLOOD GLUCOSE MONITOR STRIPS    Use to test blood sugar qd and prn    CVS LANCETS MISC    1 each by Does not apply route 4 times daily    GLIPIZIDE (GLUCOTROL) 5 MG TABLET    Take 1 tablet by mouth every morning (before breakfast)    GLUCOSE MONITORING (FREESTYLE FREEDOM) KIT    1 kit by Does not apply route daily    IBUPROFEN (ADVIL;MOTRIN) 600 MG TABLET    Take 1 tablet by mouth 3 times daily as needed for Pain    LANCETS MISC    Use one daily and prn to test sugar    METFORMIN (GLUCOPHAGE-XR) 500 MG EXTENDED RELEASE TABLET    Take 1 tablet by mouth 2 times daily (with meals)         ALLERGIES   Patient has no known allergies. FAMILY HISTORY       Family History   Problem Relation Age of Onset    Diabetes Father         double amputee          SOCIAL HISTORY       Social History     Socioeconomic History    Marital status: Legally      Spouse name: None    Number of children: None    Years of education: None    Highest education level: None   Occupational History     Employer: [employer]   Tobacco Use    Smoking status: Former Smoker     Types: Cigarettes    Smokeless tobacco: Never Used   Vaping Use    Vaping Use: Former    Substances: Never   Substance and Sexual Activity    Alcohol use:  Yes     Alcohol/week: 0.0 standard drinks     Comment: socially    Drug use: Yes     Types: Marijuana Charmayne Stavane)    Sexual activity: Yes     Partners: Male   Other Topics Concern    None   Social History Narrative    None     Social Determinants of Health     Financial Resource Strain:     Difficulty of Paying Living Expenses: Not on file   Food Insecurity:     Worried About Running Out of Food in the Last Year: Not on file    Danilo of Food in the Last Year: Not on file   Transportation Needs:     Lack of Transportation (Medical): Not on file    Lack of Transportation (Non-Medical): Not on file   Physical Activity:     Days of Exercise per Week: Not on file    Minutes of Exercise per Session: Not on file   Stress:     Feeling of Stress : Not on file   Social Connections:     Frequency of Communication with Friends and Family: Not on file    Frequency of Social Gatherings with Friends and Family: Not on file    Attends Alevism Services: Not on file    Active Member of [address] [place] [address] [employer] or Organizations: Not on file    Attends Club or Organization Meetings: Not on file    Marital Status: Not on file   Intimate Partner Violence:     Fear of Current or Ex-Partner: Not on file    Emotionally Abused: Not on file    Physically Abused: Not on file    Sexually Abused: Not on file   Housing Stability:     Unable to Pay for Housing in the Last Year: Not on file    Number of [place] in the Last Year: Not on file    Unstable Housing in the Last Year: Not on file       SCREENINGS             PHYSICAL EXAM    (up to 7 for level 4, 8 or more for level 5)     ED Triage Vitals [11/24/21 1248]   BP Temp Temp Source Pulse Resp SpO2 Height Weight   118/67 98.3 °F (36.8 °C) Tympanic 66 16 99 % -- 260 lb (117.9 kg)       Physical Exam  Vitals and nursing note reviewed. Constitutional:       Appearance: Normal appearance. HENT:      Head: Normocephalic and atraumatic.       Right Ear: Tympanic membrane and ear canal normal.      Left Ear: Tympanic membrane and ear canal normal.      Mouth/Throat:      Mouth: Mucous membranes are moist.   Eyes:      Pupils: Pupils are equal, round, and reactive to light. Cardiovascular:      Rate and Rhythm: Normal rate. Pulses: Normal pulses. Pulmonary:      Effort: Pulmonary effort is normal. No respiratory distress. Breath sounds: Normal breath sounds. No stridor. No wheezing, rhonchi or rales. Abdominal:      General: Abdomen is flat. There is no distension. Palpations: There is no mass. Tenderness: There is no abdominal tenderness. There is no right CVA tenderness, left CVA tenderness, guarding or rebound. Hernia: No hernia is present. Musculoskeletal:         General: No tenderness. Normal range of motion. Cervical back: Normal range of motion and neck supple. Skin:     General: Skin is warm and dry. Capillary Refill: Capillary refill takes less than 2 seconds. Neurological:      General: No focal deficit present. Mental Status: She is alert and oriented to person, place, and time. Psychiatric:         Mood and Affect: Mood normal.         DIAGNOSTIC RESULTS     EKG: All EKG's are interpreted by the Emergency Department Physician who either signs orCo-signs this chart in the absence of a cardiologist.      RADIOLOGY:   Non-plainfilm images such as CT, Ultrasound and MRI are read by the radiologist. Plain radiographic images are visualized and preliminarily interpreted by the emergency physician with the below findings:      Interpretationper the Radiologist below, if available at the time of this note:    No orders to display         ED BEDSIDE ULTRASOUND:   Performed by ED Physician - none    LABS:  Labs Reviewed   COVID-19, RAPID   RAPID INFLUENZA A/B ANTIGENS       All other labs were within normal range or not returned as of this dictation.     EMERGENCY DEPARTMENT COURSE and DIFFERENTIAL DIAGNOSIS/MDM:   Vitals:    Vitals:    11/24/21 1248   BP: 118/67   Pulse: 66   Resp: 16   Temp: 98.3 °F (36.8 °C)   TempSrc: Tympanic   SpO2: 99%   Weight: 260 lb (117.9 kg)         MDM  Number of Diagnoses or Management Options  Acute upper respiratory infection  Diagnosis management comments: Presented here with chief complaint upper respiratory-like symptoms including sore throat cough congestion and fever. brad is concerned for Covid. I did instruct her that even though the tests are negative they may be falsely negative at this time. Consider quarantine at home especially for the holiday. Retest if symptoms worsen. Patient  verbalized understanding agrees with plan of care              Procedures    FINAL IMPRESSION      1. Acute upper respiratory infection Stable       DISPOSITION/PLAN   DISPOSITION        PATIENT REFERRED TO:  Dandre Braga MD  [address] [place] [address]  [phone]    In 2 days  If symptoms worsen      DISCHARGE MEDICATIONS:  New Prescriptions    No medications on file              Summation      Patient Course:      ED Medications administered this visit:    Medications   dexamethasone (DECADRON) injection 10 mg (10 mg Oral Given 11/24/21 1325)       New Prescriptions from this visit:    New Prescriptions    No medications on file       Follow-up:  Dandre Braga MD  [address] [place] [address]  [phone]    In 2 days  If symptoms worsen        Final Impression:   1.  Acute upper respiratory infection Stable              (Please note that portions of this note were completed with a voice recognition program.  Efforts were made to edit the dictations but occasionally words are mis-transcribed.)         Veronica Rosas PA-C  11/24/21 0450

## 2021-12-03 ENCOUNTER — OFFICE VISIT (OUTPATIENT)
Dept: OBGYN | Age: 31
End: 2021-12-03
Payer: MEDICARE

## 2021-12-03 ENCOUNTER — HOSPITAL ENCOUNTER (OUTPATIENT)
Age: 31
Setting detail: SPECIMEN
Discharge: HOME | End: 2021-12-03
Payer: MEDICARE

## 2021-12-03 VITALS
SYSTOLIC BLOOD PRESSURE: 118 MMHG | WEIGHT: 251 LBS | HEIGHT: 65 IN | BODY MASS INDEX: 41.82 KG/M2 | DIASTOLIC BLOOD PRESSURE: 66 MMHG

## 2021-12-03 DIAGNOSIS — N90.89 VULVAR IRRITATION: Primary | ICD-10-CM

## 2021-12-03 DIAGNOSIS — N90.89 VULVAR IRRITATION: ICD-10-CM

## 2021-12-03 PROCEDURE — 99213 OFFICE O/P EST LOW 20 MIN: CPT | Performed by: OBSTETRICS & GYNECOLOGY

## 2021-12-03 PROCEDURE — G8417 CALC BMI ABV UP PARAM F/U: HCPCS | Performed by: OBSTETRICS & GYNECOLOGY

## 2021-12-03 PROCEDURE — 87070 CULTURE OTHR SPECIMN AEROBIC: CPT

## 2021-12-03 PROCEDURE — G8427 DOCREV CUR MEDS BY ELIG CLIN: HCPCS | Performed by: OBSTETRICS & GYNECOLOGY

## 2021-12-03 PROCEDURE — G8484 FLU IMMUNIZE NO ADMIN: HCPCS | Performed by: OBSTETRICS & GYNECOLOGY

## 2021-12-03 PROCEDURE — 1036F TOBACCO NON-USER: CPT | Performed by: OBSTETRICS & GYNECOLOGY

## 2021-12-03 PROCEDURE — 86403 PARTICLE AGGLUT ANTBDY SCRN: CPT

## 2021-12-03 RX ORDER — CLOTRIMAZOLE AND BETAMETHASONE DIPROPIONATE 10; .64 MG/G; MG/G
CREAM TOPICAL
Qty: 45 G | Refills: 1 | Status: SHIPPED | OUTPATIENT
Start: 2021-12-03 | End: 2022-02-18

## 2021-12-03 NOTE — PROGRESS NOTES
PROBLEM VISIT     Date of service: 12/3/2021    Eugene Sommers  Is a 36 y.o.  female    PT's PCP is: Arelis Hayden MD     : 1990                                             Subjective:       Patient's last menstrual period was 2021. OB History    Para Term  AB Living   4 3 2 1 1 3   SAB IAB Ectopic Molar Multiple Live Births   1         3      # Outcome Date GA Lbr Derek/2nd Weight Sex Delivery Anes PTL Lv   4 Term 07/27/15 38w0d   M Vag-Spont   JUDY   3  14 36w3d 09:45 8 lb (3.629 kg) M Vag-Spont EPI N JUDY   2 Term  37w0d  7 lb 5 oz (3.317 kg) M Vag-Spont   JUDY   1 SAB                 Social History     Tobacco Use   Smoking Status Former Smoker    Types: Cigarettes   Smokeless Tobacco Never Used        Social History     Substance and Sexual Activity   Alcohol Use Yes    Alcohol/week: 0.0 standard drinks    Comment: socially       Allergies: Patient has no known allergies.       Current Outpatient Medications:     glucose monitoring (FREESTYLE FREEDOM) kit, 1 kit by Does not apply route daily, Disp: 1 kit, Rfl: 0    blood glucose monitor strips, Use to test blood sugar qd and prn, Disp: 100 strip, Rfl: 5    Lancets MISC, Use one daily and prn to test sugar, Disp: 100 each, Rfl: 5    glipiZIDE (GLUCOTROL) 5 MG tablet, Take 1 tablet by mouth every morning (before breakfast), Disp: 60 tablet, Rfl: 3    metFORMIN (GLUCOPHAGE-XR) 500 MG extended release tablet, Take 1 tablet by mouth 2 times daily (with meals), Disp: 60 tablet, Rfl: 3    atorvastatin (LIPITOR) 20 MG tablet, Take 1 tablet by mouth nightly, Disp: 30 tablet, Rfl: 3    CVS Lancets MISC, 1 each by Does not apply route 4 times daily, Disp: 100 each, Rfl: 0    ibuprofen (ADVIL;MOTRIN) 600 MG tablet, Take 1 tablet by mouth 3 times daily as needed for Pain, Disp: 30 tablet, Rfl: 0    Social History     Substance and Sexual Activity   Sexual Activity Yes    Partners: Male       Last Yearly:  1/11/21    Last pap: 1/11/21    Last HPV: never    Chief Complaint   Patient presents with    Vaginal Discharge     pt presents with c/0 yeast infection for about a week, tried OTC with no relief - also has vulva sores she would like checked    Abdominal Pain     pt has left ovarian pain afor about a week and a half - does have appt next Friday for ultrasound and follow up for this issue          NURSE: mina    PE:  Vital Signs  Blood pressure 118/66, height 5' 5\" (1.651 m), weight 251 lb (113.9 kg), last menstrual period 11/23/2021, not currently breastfeeding. Labs:    No results found for this visit on 12/03/21. HPI: Patient is here today as she is having of vulvar irritation and itching. He is used over-the-counter medicines without any improvement. She is also considering fertility treatment in the future but is having regular menses now    For the past week she has had some left lower quadrant pain that is radiated around to her left groin area. This is intermittent    No PT denies fever, chills, nausea and vomiting       Objective  Lymphatic:   no lymphadenopathy  Heent:   negative   GI: Abdomen soft, non-tender. BS normal. No masses,  No organomegaly, obesity noted            Pelvic Exam: GENITAL/URINARY:  External Genitalia:  Abnormal findings: The entire vulva has diffuse moderate erythema and mild edema. Vagina:  General appearance normal, Estrogen effect normal, Lesions absent, Pelvic support normal  Cervix:  General appearance normal, Lesions absent, Discharge absent, Tenderness absent, Enlargement absent, Nodularity absent  Uterus:  Size normal, Contour normal, Position normal, Masses absent, Consistency; normal, Support normal, Tenderness absent  Adenexa:   Masses absent, Enlargement absent, Nodularity absent, mild tenderness on the left side no palpable masses                                    Vaginal discharge: culture taken                          Assessment and Plan: Will treat with Lotrisone for this vulvar irritation. Also will order an ultrasound exam to further evaluate the patient's left lower quadrant tenderness. The patient is having regular menses at this time so I did recommend they attempt conception for 6 months before attempting treatment with Clomid or Femara. She is using the Metformin regularly at this time          Diagnosis Orders   1. Vulvar irritation  clotrimazole-betamethasone (LOTRISONE) 1-0.05 % cream             I am having Hailee M. Phoenix start on clotrimazole-betamethasone. I am also having her maintain her ibuprofen, glipiZIDE, metFORMIN, atorvastatin, CVS Lancets, glucose monitoring, blood glucose test strips, and Lancets. No follow-ups on file. There are no Patient Instructions on file for this visit. Over 50% of time spent on counseling and care coordination on: see assessment and plan,  She was also counseled on her preventative health maintenance recommendations and follow-up.         FF time: 20 min      Tawana Tabares MD,12/3/2021 10:48 AM

## 2021-12-06 LAB
CULTURE: ABNORMAL
Lab: ABNORMAL
SPECIMEN DESCRIPTION: ABNORMAL

## 2021-12-07 DIAGNOSIS — A49.1 GBS (GROUP B STREPTOCOCCUS) INFECTION: Primary | ICD-10-CM

## 2021-12-07 RX ORDER — CLINDAMYCIN PHOSPHATE 20 MG/G
CREAM VAGINAL
Qty: 40 G | Refills: 0 | Status: SHIPPED | OUTPATIENT
Start: 2021-12-07 | End: 2022-02-18 | Stop reason: ALTCHOICE

## 2021-12-17 ENCOUNTER — OFFICE VISIT (OUTPATIENT)
Dept: OBGYN | Age: 31
End: 2021-12-17
Payer: MEDICARE

## 2021-12-17 VITALS
DIASTOLIC BLOOD PRESSURE: 88 MMHG | BODY MASS INDEX: 41.82 KG/M2 | SYSTOLIC BLOOD PRESSURE: 120 MMHG | HEIGHT: 65 IN | WEIGHT: 251 LBS

## 2021-12-17 DIAGNOSIS — E28.2 PCOS (POLYCYSTIC OVARIAN SYNDROME): Primary | ICD-10-CM

## 2021-12-17 PROCEDURE — G8417 CALC BMI ABV UP PARAM F/U: HCPCS | Performed by: OBSTETRICS & GYNECOLOGY

## 2021-12-17 PROCEDURE — G8427 DOCREV CUR MEDS BY ELIG CLIN: HCPCS | Performed by: OBSTETRICS & GYNECOLOGY

## 2021-12-17 PROCEDURE — 99213 OFFICE O/P EST LOW 20 MIN: CPT | Performed by: OBSTETRICS & GYNECOLOGY

## 2021-12-17 PROCEDURE — G8484 FLU IMMUNIZE NO ADMIN: HCPCS | Performed by: OBSTETRICS & GYNECOLOGY

## 2021-12-17 PROCEDURE — 1036F TOBACCO NON-USER: CPT | Performed by: OBSTETRICS & GYNECOLOGY

## 2021-12-17 NOTE — PROGRESS NOTES
PROBLEM VISIT     Date of service: 2021    Balbina Bailey  Is a 36 y.o. single female    PT's PCP is: Dandre Braga MD     : 1990                                             Subjective:       Patient's last menstrual period was 2021. OB History    Para Term  AB Living   4 3 2 1 1 3   SAB IAB Ectopic Molar Multiple Live Births   1         3      # Outcome Date GA Lbr Derek/2nd Weight Sex Delivery Anes PTL Lv   4 Term 07/27/15 38w0d   M Vag-Spont   JUDY   3  14 36w3d 09:45 8 lb (3.629 kg) M Vag-Spont EPI N JUDY   2 Term  37w0d  7 lb 5 oz (3.317 kg) M Vag-Spont   JUDY   1 SAB                 Social History     Tobacco Use   Smoking Status Former Smoker    Types: Cigarettes   Smokeless Tobacco Never Used        Social History     Substance and Sexual Activity   Alcohol Use Yes    Alcohol/week: 0.0 standard drinks    Comment: socially       Social History     Substance and Sexual Activity   Sexual Activity Yes    Partners: Male       Allergies: Patient has no known allergies. Chief Complaint   Patient presents with    Results     pt presents for in house us results       Last Yearly:  21    Last pap: 21    Last HPV: never      NURSE: mina  PE:  Vital Signs  Blood pressure 120/88, height 5' 5\" (1.651 m), weight 251 lb (113.9 kg), last menstrual period 2021, not currently breastfeeding. Labs:    No results found for this visit on 21. NURSE: paula    HPI: Patient is here as she had had a mild pain in the left lower quadrant that has been persisting. No  PT denies fever, chills, nausea and vomiting       Objective: Ultrasound reveals a significant polycystic ovaries normal uterus. Assessment and Plan: I did talk to the patient about the findings. I did offer to place her on oral contraceptives to help with this issue. However at this time she does not wish this treatment.   And she is having her type 2 diabetes treated as well          Diagnosis Orders   1. PCOS (polycystic ovarian syndrome)               No follow-ups on file. FF: 20 minutes    There are no Patient Instructions on file for this visit. Over 75%of time spent on counseling and care coordination on: see assessment and plan,  She was also counseled on her preventative health maintenance recommendations and follow-up.       Kvng Chapman MD,12/17/2021 9:47 AM

## 2022-02-18 ENCOUNTER — OFFICE VISIT (OUTPATIENT)
Dept: OBGYN | Age: 32
End: 2022-02-18
Payer: MEDICARE

## 2022-02-18 ENCOUNTER — HOSPITAL ENCOUNTER (OUTPATIENT)
Age: 32
Setting detail: SPECIMEN
Discharge: HOME | End: 2022-02-18
Payer: MEDICARE

## 2022-02-18 VITALS
HEIGHT: 65 IN | DIASTOLIC BLOOD PRESSURE: 66 MMHG | SYSTOLIC BLOOD PRESSURE: 110 MMHG | BODY MASS INDEX: 40.98 KG/M2 | WEIGHT: 246 LBS

## 2022-02-18 DIAGNOSIS — Z01.419 WOMEN'S ANNUAL ROUTINE GYNECOLOGICAL EXAMINATION: ICD-10-CM

## 2022-02-18 DIAGNOSIS — Z01.419 WOMEN'S ANNUAL ROUTINE GYNECOLOGICAL EXAMINATION: Primary | ICD-10-CM

## 2022-02-18 PROCEDURE — 99395 PREV VISIT EST AGE 18-39: CPT | Performed by: OBSTETRICS & GYNECOLOGY

## 2022-02-18 PROCEDURE — G0145 SCR C/V CYTO,THINLAYER,RESCR: HCPCS

## 2022-02-18 PROCEDURE — G8484 FLU IMMUNIZE NO ADMIN: HCPCS | Performed by: OBSTETRICS & GYNECOLOGY

## 2022-02-18 NOTE — PROGRESS NOTES
YEARLY PHYSICAL    Date of service: 2022    Jesu Cavanaugh  Is a 36 y.o.  single female    PT's PCP is: Bessy Hernandez MD     : 1990                                             Subjective:       No LMP recorded. (Menstrual status: Irregular periods). Are your menses regular: not applicable    OB History    Para Term  AB Living   4 3 2 1 1 3   SAB IAB Ectopic Molar Multiple Live Births   1         3      # Outcome Date GA Lbr Derek/2nd Weight Sex Delivery Anes PTL Lv   4 Term 07/27/15 38w0d   M Vag-Spont   JUDY   3  14 36w3d 09:45 8 lb (3.629 kg) M Vag-Spont EPI N JUDY   2 Term  37w0d  7 lb 5 oz (3.317 kg) M Vag-Spont   JUDY   1 SAB                 Social History     Tobacco Use   Smoking Status Former Smoker    Types: Cigarettes   Smokeless Tobacco Never Used        Social History     Substance and Sexual Activity   Alcohol Use Yes    Alcohol/week: 0.0 standard drinks    Comment: socially       Family History   Problem Relation Age of Onset    Diabetes Father         double amputee    Ovarian Cancer Paternal Grandmother        Allergies: Patient has no known allergies.       Current Outpatient Medications:     buPROPion (WELLBUTRIN XL) 300 MG extended release tablet, Take 1 tablet by mouth every morning, Disp: 30 tablet, Rfl: 3    OXcarbazepine (TRILEPTAL) 300 MG tablet, Take 1 tablet by mouth 2 times daily, Disp: 60 tablet, Rfl: 3    glipiZIDE (GLUCOTROL) 5 MG tablet, Take 1 tablet by mouth every morning (before breakfast), Disp: 60 tablet, Rfl: 3    metFORMIN (GLUCOPHAGE-XR) 500 MG extended release tablet, Take 1 tablet by mouth 2 times daily (with meals), Disp: 60 tablet, Rfl: 3    atorvastatin (LIPITOR) 20 MG tablet, Take 1 tablet by mouth nightly, Disp: 30 tablet, Rfl: 3    ibuprofen (ADVIL;MOTRIN) 600 MG tablet, Take 1 tablet by mouth 3 times daily as needed for Pain, Disp: 30 tablet, Rfl: 0    Social History     Substance and Sexual Activity   Sexual Activity Yes    Partners: Male       Any bleeding or pain with intercourse: No    Last Yearly:  1/11/21    Last pap: 1/11/21    Last HPV: never    Last Mammogram: 5/12/17    Last Eller Cheadle never    Do you do self breast exams: Yes    Past Medical History:   Diagnosis Date    Allergy     seasonal    Anxiety     ASCUS with positive high risk HPV 7/16/2013    Asthma childhood    [facility] [address]     B[facility] disorder ([facility] [address]     Heart abnormality     HSIL on Pap smear 8/16/2012    PCOS (polycystic ovarian syndrome)     Postpartum depression     Type AB blood, Rh positive        Past Surgical History:   Procedure Laterality Date    DILATION AND CURETTAGE  age 17    miscarriage    DILATION AND CURETTAGE OF UTERUS  10/17/2018    Dr Maria Alejandra Boyce Erb N/A 10/17/2018    DILATATION AND CURETTAGE LEEP- ECC performed by Freya Littlejohn MD at [facility] History   Problem Relation Age of Onset    Diabetes Father         double amputee    Ovarian Cancer Paternal Grandmother        Any family history of breast or ovarian cancer: Yes, PGM - ovarian    Any family history of blood clots: Yes, father      Chief Complaint   Patient presents with    Gynecologic Exam     pt presents for yearly - she is wondering why she has breast tenderness all of the time now. Nurse: Claudene Boys    PE:  Vital Signs  Blood pressure 110/66, height 5' 5\" (1.651 m), weight 246 lb (111.6 kg), not currently breastfeeding. Labs:    No results found for this visit on 02/18/22. HPI: The patient is here today for a yearly exam.  Her only complaint is bilateral diffuse breast tenderness of a chronic nature.     NoPT denies fever, chills, nausea and vomiting       Objective  Lymphatic:   no lymphadenopathy  Heent:   negative   Cor: regular rate and rhythm, no murmurs              Pul:clear to auscultation bilaterally- no wheezes, rales or rhonchi, normal air movement, no respiratory distress      GI: Abdomen soft, non-tender. BS normal. No masses,  No organomegaly, obesity noted           Extremities: normal strength, tone, and muscle mass   Breasts: Breast:normal appearance, no masses or tenderness, Inspection negative, No nipple retraction or dimpling, No nipple discharge or bleeding, No axillary or supraclavicular adenopathy, Normal to palpation without dominant masses   Pelvic Exam: GENITAL/URINARY:  External Genitalia:  General appearance; normal, Hair distribution; normal, Lesions absent  Vagina:  General appearance normal, Estrogen effect normal, Discharge absent, Lesions absent, Pelvic support normal  Cervix:  General appearance normal, Lesions absent, Discharge absent, Tenderness absent, Enlargement absent, Nodularity absent, minimal scarring noted  Uterus:  Size normal, Contour normal, Position normal, Masses absent, Consistency; normal, Support normal, Tenderness absent  Adenexa: Masses absent, Tenderness absent, Enlargement absent, Nodularity absent                                      Vaginal discharge: no vaginal discharge      Uterus: normal size, anteverted, mobile, non-tender, normal shape and consistency     Ovaries: Nonenlarged nontender           Over 50% of time spent on counseling and care coordination on: see assessment and plan                        Assessment and Plan: Routine care yearly Paps with history of HGSIL        Diagnosis Orders   1. Women's annual routine gynecological examination  PAP SMEAR       I have discontinued Hailee M. Phoenix's CVS Lancets, glucose monitoring, blood glucose test strips, Lancets, clotrimazole-betamethasone, and clindamycin. I am also having her maintain her ibuprofen, glipiZIDE, metFORMIN, atorvastatin, buPROPion, and OXcarbazepine.

## 2022-03-04 LAB — CYTOLOGY REPORT: NORMAL

## 2022-05-26 ENCOUNTER — HOSPITAL ENCOUNTER (OUTPATIENT)
Age: 32
Discharge: HOME | End: 2022-05-26
Payer: MEDICARE

## 2022-05-26 LAB
CHOLESTEROL/HDL RATIO: 5.6
CHOLESTEROL: 207 MG/DL
ESTIMATED AVERAGE GLUCOSE: 146 MG/DL
FOLLICLE STIMULATING HORMONE: 4.9 MIU/ML (ref 1.7–21.5)
HBA1C MFR BLD: 6.7 % (ref 4–6)
HCT VFR BLD CALC: 43.6 % (ref 36.3–47.1)
HDLC SERPL-MCNC: 37 MG/DL
HEMOGLOBIN: 14.9 G/DL (ref 11.9–15.1)
LDL CHOLESTEROL: 129 MG/DL (ref 0–130)
LH: 8.5 MIU/ML (ref 1–95.6)
MCH RBC QN AUTO: 31.4 PG (ref 25.2–33.5)
MCHC RBC AUTO-ENTMCNC: 34.2 G/DL (ref 28.4–34.8)
MCV RBC AUTO: 91.8 FL (ref 82.6–102.9)
NRBC AUTOMATED: 0 PER 100 WBC
PDW BLD-RTO: 12.7 % (ref 11.8–14.4)
PLATELET # BLD: 272 K/UL (ref 138–453)
PMV BLD AUTO: 9.3 FL (ref 8.1–13.5)
PROLACTIN: 19.24 NG/ML (ref 4.79–23.3)
RBC # BLD: 4.75 M/UL (ref 3.95–5.11)
TRIGL SERPL-MCNC: 204 MG/DL
TSH SERPL DL<=0.05 MIU/L-ACNC: 1.14 UIU/ML (ref 0.3–5)
WBC # BLD: 7.2 K/UL (ref 3.5–11.3)

## 2022-05-26 PROCEDURE — 83001 ASSAY OF GONADOTROPIN (FSH): CPT

## 2022-05-26 PROCEDURE — 85027 COMPLETE CBC AUTOMATED: CPT

## 2022-05-26 PROCEDURE — 84146 ASSAY OF PROLACTIN: CPT

## 2022-05-26 PROCEDURE — 83036 HEMOGLOBIN GLYCOSYLATED A1C: CPT

## 2022-05-26 PROCEDURE — 83002 ASSAY OF GONADOTROPIN (LH): CPT

## 2022-05-26 PROCEDURE — 84443 ASSAY THYROID STIM HORMONE: CPT

## 2022-05-26 PROCEDURE — 80061 LIPID PANEL: CPT

## 2022-05-26 PROCEDURE — 36415 COLL VENOUS BLD VENIPUNCTURE: CPT

## 2023-01-02 PROCEDURE — 99283 EMERGENCY DEPT VISIT LOW MDM: CPT

## 2023-01-03 ENCOUNTER — HOSPITAL ENCOUNTER (EMERGENCY)
Age: 33
Discharge: HOME | End: 2023-01-03
Payer: MEDICARE

## 2023-01-03 ENCOUNTER — HOSPITAL ENCOUNTER (OUTPATIENT)
Age: 33
Discharge: HOME | End: 2023-01-03
Payer: MEDICARE

## 2023-01-03 ENCOUNTER — HOSPITAL ENCOUNTER (EMERGENCY)
Age: 33
Discharge: HOME | End: 2023-01-03
Attending: EMERGENCY MEDICINE
Payer: MEDICARE

## 2023-01-03 VITALS
DIASTOLIC BLOOD PRESSURE: 85 MMHG | OXYGEN SATURATION: 99 % | RESPIRATION RATE: 20 BRPM | TEMPERATURE: 98.1 F | HEART RATE: 72 BPM | SYSTOLIC BLOOD PRESSURE: 123 MMHG

## 2023-01-03 DIAGNOSIS — U07.1 COVID-19: ICD-10-CM

## 2023-01-03 DIAGNOSIS — U07.1 COVID-19: Primary | ICD-10-CM

## 2023-01-03 LAB
SARS-COV-2, RAPID: DETECTED
SPECIMEN DESCRIPTION: ABNORMAL

## 2023-01-03 PROCEDURE — 86769 SARS-COV-2 COVID-19 ANTIBODY: CPT

## 2023-01-03 PROCEDURE — 87635 SARS-COV-2 COVID-19 AMP PRB: CPT

## 2023-01-03 PROCEDURE — 4500000002 HC ER NO CHARGE

## 2023-01-03 PROCEDURE — 36415 COLL VENOUS BLD VENIPUNCTURE: CPT

## 2023-01-03 ASSESSMENT — ENCOUNTER SYMPTOMS
BACK PAIN: 0
SORE THROAT: 0
ABDOMINAL DISTENTION: 0
COUGH: 1
SHORTNESS OF BREATH: 0

## 2023-01-03 NOTE — DISCHARGE INSTRUCTIONS
Supportive care at this time with Tylenol and ibuprofen for fever and body aches as needed. You can take Claritin or Sudafed for congestion.

## 2023-01-03 NOTE — Clinical Note
Rosa Elena Jeffries was seen and treated in our emergency department on 1/2/2023. She may return to school on 01/09/2023. Please excuse patient from school due to being tested positive for COVID-19. May return on January 9th, 2033. If you have any questions or concerns, please don't hesitate to call.       Liliana Villalpando, DO

## 2023-01-03 NOTE — ED PROVIDER NOTES
[facility] ED  EMERGENCY DEPARTMENT ENCOUNTER      Pt Name: Shannan May  MRN: [id number]  [place] 1990  Date of evaluation: 1/2/2023  Provider: Mouna Diallo, [facility]       Chief Complaint   Patient presents with    Fever    Concern For COVID-19     Pt here for cough and covid like symptoms. Pt states she took an at home test and it came back positive. HISTORY OF PRESENT ILLNESS   (Location/Symptom, Timing/Onset, Context/Setting, Quality, Duration, Modifying Factors, Severity)  Note limiting factors. Shannan May is a 29 y.o. female who presents to the emergency department requesting a COVID test.  Patient states that she has had flulike symptoms for the past 2 days. She took at home test tonight which was positive for COVID-19. She is here requesting an official test for school purposes. Patient states that she was exposed to her ex- and his girlfriend recently and they were tested positive for COVID-19. She has not been vaccinated against COVID-19. She denies any fever or chills but she did take some ibuprofen prior to coming to the emergency department. She denies any other concerns. REVIEW OF SYSTEMS    (2-9 systems for level 4, 10 or more for level 5)     Review of Systems   Constitutional:  Negative for fatigue and fever. HENT:  Positive for congestion. Negative for sore throat. Eyes:  Negative for visual disturbance. Respiratory:  Positive for cough. Negative for shortness of breath. Cardiovascular:  Negative for chest pain and palpitations. Gastrointestinal:  Negative for abdominal distention. Genitourinary:  Negative for dysuria. Musculoskeletal:  Negative for back pain. Skin:  Negative for rash. Psychiatric/Behavioral:  Negative for suicidal ideas. Except as noted above the remainder of the review of systems was reviewed and negative.        PAST MEDICAL HISTORY     Past Medical History:   Diagnosis Date    Allergy seasonal    Anxiety     ASCUS with positive high risk HPV 7/16/2013    Asthma childhood    Bipolar disorder ([facility] [address]     Bipolar disorder (HCC)     Heart abnormality     HSIL on Pap smear 8/16/2012    PCOS (polycystic ovarian syndrome)     Postpartum depression     Type AB blood, Rh positive          SURGICAL HISTORY       Past Surgical History:   Procedure Laterality Date    DILATION AND CURETTAGE  age 17    miscarriage    DILATION AND CURETTAGE OF UTERUS  10/17/2018    Dr Keith Daniels- Ericka Amaro W/WO D&C RPR ELTRD EXC N/A 10/17/2018    DILATATION AND CURETTAGE LEEP- ECC performed by Samanta Han [facility] at [facility]       Previous Medications    BUPROPION (WELLBUTRIN XL) 300 MG EXTENDED RELEASE TABLET    TAKE 1 TABLET BY MOUTH EVERY DAY IN THE MORNING    IBUPROFEN (ADVIL;MOTRIN) 600 MG TABLET    Take 1 tablet by mouth 3 times daily as needed for Pain    METFORMIN (GLUCOPHAGE-XR) 500 MG EXTENDED RELEASE TABLET    Take 1 tablet by mouth 2 times daily (with meals)    OXCARBAZEPINE (TRILEPTAL) 300 MG TABLET    TAKE 1 TABLET BY MOUTH TWICE A DAY       ALLERGIES     Patient has no known allergies. FAMILY HISTORY       Family History   Problem Relation Age of Onset    Diabetes Father         double amputee    Ovarian Cancer Paternal Grandmother           SOCIAL HISTORY       Social History     Socioeconomic History    Marital status: Single   Occupational History     Employer: [employer]   Tobacco Use    Smoking status: Former     Types: Cigarettes    Smokeless tobacco: Never   Vaping Use    Vaping Use: Former    Substances: Never   Substance and Sexual Activity    Alcohol use:  Yes     Alcohol/week: 0.0 standard drinks     Comment: socially    Drug use: Yes     Types: Marijuana Elisa Coil)    Sexual activity: Yes     Partners: Male       SCREENINGS        Johnnie Coma Scale  Eye Opening: Spontaneous  Best Verbal Response: Oriented  Best Motor Response: Obeys commands  Johnnie Coma Scale Score: 15               PHYSICAL EXAM    (up to 7 for level 4, 8 or more for level 5)     ED Triage Vitals [01/03/23 0004]   BP Temp Temp Source Heart Rate Resp SpO2 Height Weight   123/85 98.1 °F (36.7 °C) Tympanic 72 20 99 % -- --       Physical Exam  Constitutional:       General: She is not in acute distress. Appearance: Normal appearance. She is not toxic-appearing. HENT:      Head: Normocephalic and atraumatic. Mouth/Throat:      Mouth: Mucous membranes are moist.   Eyes:      Extraocular Movements: Extraocular movements intact. Pupils: Pupils are equal, round, and reactive to light. Cardiovascular:      Rate and Rhythm: Normal rate and regular rhythm. Pulses: Normal pulses. Heart sounds: Normal heart sounds. Pulmonary:      Effort: Pulmonary effort is normal.      Breath sounds: Normal breath sounds. Abdominal:      General: Abdomen is flat. Bowel sounds are normal.      Palpations: Abdomen is soft. Musculoskeletal:         General: Normal range of motion. Skin:     General: Skin is warm and dry. Capillary Refill: Capillary refill takes less than 2 seconds. Neurological:      General: No focal deficit present. Mental Status: She is alert and oriented to person, place, and time.    Psychiatric:         Mood and Affect: Mood normal.       DIAGNOSTIC RESULTS     EKG: All EKG's are interpreted by the Emergency Department Physician who either signs or Co-signs this chart in the absence of a cardiologist.        RADIOLOGY:   Non-plain film images such as CT, Ultrasound and MRI are read by the radiologist. Plain radiographic images are visualized and preliminarily interpreted by the emergency physician with the below findings:        Interpretation per the Radiologist below, if available at the time of this note:    No orders to display         ED BEDSIDE ULTRASOUND:   Performed by ED Physician - none    LABS:  Labs Reviewed   COVID-19, RAPID - Abnormal; Notable for the following components:       Result Value    SARS-CoV-2, Rapid DETECTED (*)     All other components within normal limits       All other labs were within normal range or not returned as of this dictation. EMERGENCY DEPARTMENT COURSE and DIFFERENTIAL DIAGNOSIS/MDM:   Vitals:    Vitals:    01/03/23 0004   BP: 123/85   Pulse: 72   Resp: 20   Temp: 98.1 °F (36.7 °C)   TempSrc: Tympanic   SpO2: 99%       MDM        REASSESSMENT      Patient's COVID-19 test is positive. Patient is just requesting a note for school. Commend supportive care at this time with Tylenol and ibuprofen for fever as needed. FINAL IMPRESSION      1. COVID-19          DISPOSITION/PLAN   DISPOSITION Decision To Discharge 01/03/2023 12:51:07 AM      PATIENT REFERRED TO:  Rex Martinez DO  [address] [facility] [address]  [phone]    In 1 week      DISCHARGE MEDICATIONS:  New Prescriptions    No medications on file     Controlled Substances Monitoring:     No flowsheet data found.     (Please note that portions of this note were completed with a voice recognition program.  Efforts were made to edit the dictations but occasionally words are mis-transcribed.)    Charlie Agosto DO (electronically signed)  Attending Emergency Physician            Charlie Agosto DO  01/03/23 [id number]

## 2023-01-05 LAB — SARS-COV-2, IGG: NEGATIVE

## 2023-02-22 ENCOUNTER — TELEPHONE (OUTPATIENT)
Dept: BARIATRICS/WEIGHT MGMT | Age: 33
End: 2023-02-22

## 2023-02-22 NOTE — TELEPHONE ENCOUNTER
Online Info Session Completed:  on 2/20/23 okay to schedule with Dr. Evelio Molina   with [facility]    Patient informed the following: This is NOT a guarantee of payment  When stating that you have a Benefit or Coverage for Bariatric Surgery - that means that you may qualify for the surgery  Bariatric Surgery is considered an elective procedure, patient is responsible to know their benefits . Any information we obtain when calling your insurance  is not  a guarantee of  coverage  and/or  benefit. Appointment Note :   New Patient , [place] [facility],   3   month visits,  PG Fee $200,  Mailed Packet or advised to arrive  early      Remind Patient of $200 Program fee with $ 100 required at Second visit with office on initial dietician visit. Remind Patient they must be nicotine free. They will be tested at the beginning of the program and prior to surgery. Advise Patient Responsible for out of pocket, copay at medical visits, Deductible and coinsurance applied to medical visits and procedure. You will be responsible for any of the following:  Copays   Deductibles   Co insurances     The items mentioned above are indicated or required by your insurance plan. Your deductible and coinsurance are applied to medical visits and procedures. Verified with patient if he or she has had any previous bariatric surgery? no  ( If yes ,advise patient of transfer of care process and program fee)       .

## 2023-03-16 ENCOUNTER — OFFICE VISIT (OUTPATIENT)
Dept: BARIATRICS/WEIGHT MGMT | Age: 33
End: 2023-03-16
Payer: MEDICAID

## 2023-03-16 VITALS
BODY MASS INDEX: 42.82 KG/M2 | HEART RATE: 77 BPM | WEIGHT: 257 LBS | HEIGHT: 65 IN | SYSTOLIC BLOOD PRESSURE: 122 MMHG | RESPIRATION RATE: 22 BRPM | DIASTOLIC BLOOD PRESSURE: 80 MMHG

## 2023-03-16 DIAGNOSIS — K21.9 GERD WITHOUT ESOPHAGITIS: ICD-10-CM

## 2023-03-16 DIAGNOSIS — E66.9 DIABETES MELLITUS TYPE 2 IN OBESE (HCC): Primary | ICD-10-CM

## 2023-03-16 DIAGNOSIS — E11.69 DIABETES MELLITUS TYPE 2 IN OBESE (HCC): Primary | ICD-10-CM

## 2023-03-16 DIAGNOSIS — E66.01 MORBID OBESITY WITH BMI OF 40.0-44.9, ADULT (HCC): ICD-10-CM

## 2023-03-16 PROCEDURE — 99204 OFFICE O/P NEW MOD 45 MIN: CPT | Performed by: SURGERY

## 2023-03-16 PROCEDURE — 3051F HG A1C>EQUAL 7.0%<8.0%: CPT | Performed by: SURGERY

## 2023-03-16 NOTE — LETTER
Visit Date: 3/16/2023    Patient: Annalee Akbar  YOB: 1990    Dear Dr. Justice Mahoney DO,      I had the pleasure of seeing Bambi Lafleur in the office today for a consult for weight loss surgery. Her current Weight: 257 lb (116.6 kg), which gives her a Body mass index is 42.77 kg/m². .  We had a long discussion regarding surgical options for weight loss and improvement in co-morbidities. She is considering a bariatric  procedure. We will start the preoperative workup, which includes bloodwork, psychological evaluation, support group attendance, and preoperative medical clearance from you. We will also initiate pre-certification for surgery, which requires a letter of medical necessity from you and often office notes documenting a weight history and co-morbidities. Leigha will require 3 months of medically supervised visits as specified by the patient's insurance. Thank you for allowing me to participate in the care of your patient. If you have any questions or concerns, please do not hesitate to call.       Sincerely,     Buck Sarmiento DO  Director of Bariatric and Minimally Invasive Surgery  AMBIKA LAZARO [facility], [address] [facility], [address] [place] [address] Samia: [phone]  F: [phone]

## 2023-03-16 NOTE — PROGRESS NOTES
[address] [facility] INVASIVE BARIATRIC SURG  [address]  [facility] [address]  Dept: [phone]    SURGICAL WEIGHT MANAGEMENT PROGRAM  PROGRESS NOTE INITIAL EVALUATION     Patient: Maria C Wellington        Service Date: 3/16/2023      HPI:     Chief Complaint   Patient presents with    Bariatric, Initial Visit    Weight Loss         The patient is a pleasant 29y.o. year old female  with morbid obesity, who stands Height: 5' 5\" (165.1 cm) tall with a weight of Weight: 257 lb (116.6 kg) , resulting in a BMI of Body mass index is 42.77 kg/m². . The patient suffers from multiple co-morbidities as a result of morbid obesity, including: Type 2 Diabetes Mellitus, GERD, and Bipolar Disorder. She has suffered from obesity for many years. The patient denies  a history of myocardial infarction, deep vein thrombosis, pulmonary embolism, renal failure, hepatic failure, and stroke. She is a former smoker, and she quit in 2013. She continues to smoke marijuana. The patient has failed multiple attempts at non-surgical weight loss, and is now seeking surgical intervention to promote permanent and consistent weight loss. She  has chosen Sleeve Gastrectomy. She is well educated regarding it, as she has recently viewed our weight loss surgery informational seminar .      Medical History:  Past Medical History:   Diagnosis Date    Allergy     seasonal    Anxiety     ASCUS with positive high risk HPV 7/16/2013    Asthma childhood    Bipolar disorder ([address]     Bipolar disorder ([facility] [address]     Heart abnormality     HSIL on Pap smear 8/16/2012    PCOS (polycystic ovarian syndrome)     Postpartum depression     Type AB blood, Rh positive        Surgical History:  Past Surgical History:   Procedure Laterality Date    DILATION AND CURETTAGE  age 17    miscarriage    DILATION AND CURETTAGE OF UTERUS  10/17/2018    Dr Salty Ireland W/WO D&C RPR ELTRD EXC N/A

## 2023-03-23 ENCOUNTER — NURSE ONLY (OUTPATIENT)
Dept: BARIATRICS/WEIGHT MGMT | Age: 33
End: 2023-03-23

## 2023-03-23 VITALS — WEIGHT: 254.6 LBS | BODY MASS INDEX: 42.37 KG/M2

## 2023-03-23 NOTE — PROGRESS NOTES
Medical Nutrition Therapy  Initial Nutrition Assessment for Metabolic/ Bariatric Surgery  Required insurance visit prior to surgery:  3    Nutrition Assessment:  Abbi Gibbons is a 34 y.o. female with a date of birth of 1990 being seen regarding weight loss surgery. Weight History: Wt Readings from Last 3 Encounters:   03/23/23 254 lb 9.6 oz (115.5 kg)   03/16/23 257 lb (116.6 kg)   02/06/23 257 lb (116.6 kg)        BMI: Body mass index is 42.37 kg/m². Weight History  Hailee M Phoenix's highest adult weight was 257 lbs at age 29. Patient was at her highest weight for 5 months. Hailee M Phoenix's lowest adult weight was 195 lbs at age 27. Patient was at her lowest weight for 1 years. The lowest weight was achieved through just what Leigha weighed . Physical Activity  Do you participate in a structured exercise program, step counting, or regular physical activity? yes    Shared with patient the importance of documenting exercise and staying at or below start weight during visits. Instructions and exercise logs were provided to patient today see goal sheet and plan. Previous weight loss attempts  Patient has participated in the following weight loss programs:  metabolife or herbalife, self directed calorie restriction, and lowcarb    Nutrition History  Have you ever been diagnosed with an eating disorder? No  Have you ever been diagnosed with a vitamin or mineral deficiency? no  Have you ever had problems tolerating a multivitamin or mineral supplement? yes     Patient dines out to a sit down restaurant 1 times per week. Patient dines out to a fast food restaurant 1 times per week. Patient does have grazing. Patient does have night eating. Patient does have a history of emotional eating. Patient does have a history of  eating out of boredom.     Drinks throughout the day: coffee, water, sweet tea, and unsweetened tea    24 hour recall/food frequency: has been scanned

## 2023-04-03 ENCOUNTER — NURSE ONLY (OUTPATIENT)
Dept: BARIATRICS/WEIGHT MGMT | Age: 33
End: 2023-04-03

## 2023-04-14 PROBLEM — Z98.890 HISTORY OF LOOP ELECTROSURGICAL EXCISION PROCEDURE (LEEP) OF CERVIX: Status: ACTIVE | Noted: 2023-04-14

## 2023-04-14 PROBLEM — E28.2 PCOS (POLYCYSTIC OVARIAN SYNDROME): Status: ACTIVE | Noted: 2023-04-14

## 2023-04-14 PROBLEM — R55 NEUROCARDIOGENIC SYNCOPE: Status: RESOLVED | Noted: 2021-09-22 | Resolved: 2023-04-14

## 2023-05-12 ENCOUNTER — HOSPITAL ENCOUNTER (OUTPATIENT)
Age: 33
Discharge: HOME | End: 2023-05-12
Payer: MEDICAID

## 2023-05-12 DIAGNOSIS — E11.69 DIABETES MELLITUS TYPE 2 IN OBESE (HCC): ICD-10-CM

## 2023-05-12 DIAGNOSIS — E28.2 PCOS (POLYCYSTIC OVARIAN SYNDROME): ICD-10-CM

## 2023-05-12 DIAGNOSIS — F32.A ANXIETY AND DEPRESSION: ICD-10-CM

## 2023-05-12 DIAGNOSIS — F41.9 ANXIETY AND DEPRESSION: ICD-10-CM

## 2023-05-12 DIAGNOSIS — E66.9 DIABETES MELLITUS TYPE 2 IN OBESE (HCC): ICD-10-CM

## 2023-05-12 DIAGNOSIS — F31.9 BIPOLAR DISEASE, CHRONIC (HCC): ICD-10-CM

## 2023-05-12 DIAGNOSIS — E66.01 OBESITY, CLASS III, BMI 40-49.9 (MORBID OBESITY) (HCC): ICD-10-CM

## 2023-05-12 DIAGNOSIS — Z98.890 HISTORY OF LOOP ELECTROSURGICAL EXCISION PROCEDURE (LEEP) OF CERVIX: ICD-10-CM

## 2023-05-12 DIAGNOSIS — R87.613 HGSIL (HIGH GRADE SQUAMOUS INTRAEPITHELIAL LESION) ON PAP SMEAR OF CERVIX: ICD-10-CM

## 2023-05-12 DIAGNOSIS — R00.1 BRADYCARDIA: ICD-10-CM

## 2023-05-12 LAB
25(OH)D3 SERPL-MCNC: 24.4 NG/ML
ABSOLUTE EOS #: 0.08 K/UL (ref 0–0.44)
ABSOLUTE IMMATURE GRANULOCYTE: <0.03 K/UL (ref 0–0.3)
ABSOLUTE LYMPH #: 2.51 K/UL (ref 1.1–3.7)
ABSOLUTE MONO #: 0.47 K/UL (ref 0.1–1.2)
ALBUMIN SERPL-MCNC: 4.6 G/DL (ref 3.5–5.2)
ALBUMIN/GLOBULIN RATIO: 1.6 (ref 1–2.5)
ALP SERPL-CCNC: 47 U/L (ref 35–104)
ALT SERPL-CCNC: 118 U/L (ref 5–33)
AMPHETAMINE SCREEN URINE: NEGATIVE
ANION GAP SERPL CALCULATED.3IONS-SCNC: 11 MMOL/L (ref 9–17)
AST SERPL-CCNC: 82 U/L
BARBITURATE SCREEN URINE: NEGATIVE
BASOPHILS # BLD: 1 % (ref 0–2)
BASOPHILS ABSOLUTE: 0.04 K/UL (ref 0–0.2)
BENZODIAZEPINE SCREEN, URINE: NEGATIVE
BILIRUB SERPL-MCNC: 0.5 MG/DL (ref 0.3–1.2)
BUN SERPL-MCNC: 13 MG/DL (ref 6–20)
BUN/CREAT BLD: 20 (ref 9–20)
BUPRENORPHINE URINE: NEGATIVE
CALCIUM SERPL-MCNC: 9.1 MG/DL (ref 8.6–10.4)
CANNABINOID SCREEN URINE: POSITIVE
CHLORIDE SERPL-SCNC: 106 MMOL/L (ref 98–107)
CHOLEST SERPL-MCNC: 171 MG/DL
CHOLESTEROL/HDL RATIO: 4.9
CO2 SERPL-SCNC: 24 MMOL/L (ref 20–31)
COCAINE METABOLITE, URINE: NEGATIVE
CREAT SERPL-MCNC: 0.66 MG/DL (ref 0.5–0.9)
EOSINOPHILS RELATIVE PERCENT: 1 % (ref 1–4)
FENTANYL URINE: NEGATIVE
FOLATE SERPL-MCNC: 19.3 NG/ML
GFR SERPL CREATININE-BSD FRML MDRD: >60 ML/MIN/1.73M2
GLUCOSE SERPL-MCNC: 117 MG/DL (ref 70–99)
HCT VFR BLD AUTO: 40.6 % (ref 36.3–47.1)
HDLC SERPL-MCNC: 35 MG/DL
HGB BLD-MCNC: 13.9 G/DL (ref 11.9–15.1)
IMMATURE GRANULOCYTES: 0 %
IRON SATURATION: 31 % (ref 20–55)
IRON SERPL-MCNC: 88 UG/DL (ref 37–145)
LDLC SERPL CALC-MCNC: 111 MG/DL (ref 0–130)
LYMPHOCYTES # BLD: 40 % (ref 24–43)
MAGNESIUM SERPL-MCNC: 2.2 MG/DL (ref 1.6–2.6)
MCH RBC QN AUTO: 30.9 PG (ref 25.2–33.5)
MCHC RBC AUTO-ENTMCNC: 34.2 G/DL (ref 28.4–34.8)
MCV RBC AUTO: 90.2 FL (ref 82.6–102.9)
METHADONE SCREEN, URINE: NEGATIVE
MONOCYTES # BLD: 7 % (ref 3–12)
NRBC AUTOMATED: 0 PER 100 WBC
OPIATES, URINE: NEGATIVE
OXYCODONE SCREEN URINE: NEGATIVE
PDW BLD-RTO: 13 % (ref 11.8–14.4)
PHENCYCLIDINE, URINE: NEGATIVE
PLATELET # BLD AUTO: 282 K/UL (ref 138–453)
PMV BLD AUTO: 9.3 FL (ref 8.1–13.5)
POTASSIUM SERPL-SCNC: 3.8 MMOL/L (ref 3.7–5.3)
PROT SERPL-MCNC: 7.5 G/DL (ref 6.4–8.3)
PTH-INTACT SERPL-MCNC: 55 PG/ML (ref 14–72)
RBC # BLD: 4.5 M/UL (ref 3.95–5.11)
SEG NEUTROPHILS: 51 % (ref 36–65)
SEGMENTED NEUTROPHILS ABSOLUTE COUNT: 3.21 K/UL (ref 1.5–8.1)
SODIUM SERPL-SCNC: 141 MMOL/L (ref 135–144)
TEST INFORMATION: ABNORMAL
TIBC SERPL-MCNC: 287 UG/DL (ref 250–450)
TRIGL SERPL-MCNC: 126 MG/DL
TSH SERPL-ACNC: 0.7 UIU/ML (ref 0.3–5)
UNSATURATED IRON BINDING CAPACITY: 199 UG/DL (ref 112–347)
VIT B12 SERPL-MCNC: 794 PG/ML (ref 232–1245)
WBC # BLD AUTO: 6.3 K/UL (ref 3.5–11.3)

## 2023-05-12 PROCEDURE — 84425 ASSAY OF VITAMIN B-1: CPT

## 2023-05-12 PROCEDURE — 85025 COMPLETE CBC W/AUTO DIFF WBC: CPT

## 2023-05-12 PROCEDURE — 84590 ASSAY OF VITAMIN A: CPT

## 2023-05-12 PROCEDURE — 36415 COLL VENOUS BLD VENIPUNCTURE: CPT

## 2023-05-12 PROCEDURE — 82306 VITAMIN D 25 HYDROXY: CPT

## 2023-05-12 PROCEDURE — 80053 COMPREHEN METABOLIC PANEL: CPT

## 2023-05-12 PROCEDURE — 83735 ASSAY OF MAGNESIUM: CPT

## 2023-05-12 PROCEDURE — 82746 ASSAY OF FOLIC ACID SERUM: CPT

## 2023-05-12 PROCEDURE — 82607 VITAMIN B-12: CPT

## 2023-05-12 PROCEDURE — 83036 HEMOGLOBIN GLYCOSYLATED A1C: CPT

## 2023-05-12 PROCEDURE — 84443 ASSAY THYROID STIM HORMONE: CPT

## 2023-05-12 PROCEDURE — 83970 ASSAY OF PARATHORMONE: CPT

## 2023-05-12 PROCEDURE — G0480 DRUG TEST DEF 1-7 CLASSES: HCPCS

## 2023-05-12 PROCEDURE — 83550 IRON BINDING TEST: CPT

## 2023-05-12 PROCEDURE — 83540 ASSAY OF IRON: CPT

## 2023-05-12 PROCEDURE — 80307 DRUG TEST PRSMV CHEM ANLYZR: CPT

## 2023-05-12 PROCEDURE — 84630 ASSAY OF ZINC: CPT

## 2023-05-12 PROCEDURE — 80061 LIPID PANEL: CPT

## 2023-05-14 LAB
EST. AVERAGE GLUCOSE BLD GHB EST-MCNC: 192 MG/DL
HBA1C MFR BLD: 8.3 % (ref 4–6)
ZINC: 94.6 UG/DL (ref 60–120)

## 2023-05-15 ENCOUNTER — OFFICE VISIT (OUTPATIENT)
Dept: BARIATRICS/WEIGHT MGMT | Age: 33
End: 2023-05-15
Payer: MEDICAID

## 2023-05-15 VITALS
SYSTOLIC BLOOD PRESSURE: 120 MMHG | HEIGHT: 65 IN | WEIGHT: 247 LBS | BODY MASS INDEX: 41.15 KG/M2 | HEART RATE: 70 BPM | DIASTOLIC BLOOD PRESSURE: 80 MMHG

## 2023-05-15 DIAGNOSIS — E66.9 DIABETES MELLITUS TYPE 2 IN OBESE (HCC): Primary | ICD-10-CM

## 2023-05-15 DIAGNOSIS — F12.90 MARIJUANA USE: ICD-10-CM

## 2023-05-15 DIAGNOSIS — F41.9 ANXIETY AND DEPRESSION: ICD-10-CM

## 2023-05-15 DIAGNOSIS — E28.2 PCOS (POLYCYSTIC OVARIAN SYNDROME): ICD-10-CM

## 2023-05-15 DIAGNOSIS — K76.0 FATTY LIVER: ICD-10-CM

## 2023-05-15 DIAGNOSIS — E11.69 DIABETES MELLITUS TYPE 2 IN OBESE (HCC): Primary | ICD-10-CM

## 2023-05-15 DIAGNOSIS — E66.01 OBESITY, CLASS III, BMI 40-49.9 (MORBID OBESITY) (HCC): ICD-10-CM

## 2023-05-15 DIAGNOSIS — F31.9 BIPOLAR DISEASE, CHRONIC (HCC): ICD-10-CM

## 2023-05-15 DIAGNOSIS — F32.A ANXIETY AND DEPRESSION: ICD-10-CM

## 2023-05-15 DIAGNOSIS — E55.9 VITAMIN D DEFICIENCY: Primary | ICD-10-CM

## 2023-05-15 PROBLEM — E11.9 NEW ONSET TYPE 2 DIABETES MELLITUS (HCC): Status: RESOLVED | Noted: 2021-09-21 | Resolved: 2023-05-15

## 2023-05-15 LAB
COTININE: <5 NG/ML
NICOTINE: <5 NG/ML
RETINYL PALMITATE: 0.02 MG/L (ref 0–0.1)
VITAMIN A LEVEL: 0.47 MG/L (ref 0.3–1.2)
VITAMIN A, INTERP: NORMAL

## 2023-05-15 PROCEDURE — 99213 OFFICE O/P EST LOW 20 MIN: CPT | Performed by: NURSE PRACTITIONER

## 2023-05-15 PROCEDURE — 3052F HG A1C>EQUAL 8.0%<EQUAL 9.0%: CPT | Performed by: NURSE PRACTITIONER

## 2023-05-15 RX ORDER — ONDANSETRON 4 MG/1
TABLET, ORALLY DISINTEGRATING ORAL
COMMUNITY
Start: 2023-05-08

## 2023-05-15 RX ORDER — ERGOCALCIFEROL 1.25 MG/1
50000 CAPSULE ORAL WEEKLY
Qty: 8 CAPSULE | Refills: 0 | Status: SHIPPED | OUTPATIENT
Start: 2023-05-15 | End: 2023-07-04

## 2023-05-15 NOTE — PROGRESS NOTES
Medical Nutrition Therapy          Supervised Diet & Exercise Pre- op   Metabolic and Bariatric Surgery  Visit 2 out of 3    Nutrition Assessment:     Vitals: Wt Readings from Last 3 Encounters:   05/15/23 247 lb (112 kg)   04/14/23 254 lb (115.2 kg)   03/23/23 254 lb 9.6 oz (115.5 kg)         Nutrition Diagnosis:  Knowledge deficit related to healthy behaviors that support weight management after weight loss surgery as evidenced by Body mass index is 41.1 kg/m². Nutrition Intervention:  Nutrition Prescription:  Bariatric Pre-op Diet    Nutrition Counseling:  Reviewed patients current behaviors and habits and discussed barriers for change. Utilized motivational interviewing to set patient specific goals that promote bariatric behaviors. Patient displays understanding of the goals discussed. Nutrition Education:   Reviewed and available in the Bariatric Education [facility]. Goals:  Changes in eating patterns to promote health are noted below on the goals number 19-22    1. Pt has completed 5 out of 5 goals. 2. Continue all previous goals and add: #10, 12, 14    All goals were planned with and agreed on by the patient. I want to improve my health because I want to manage my PCOS. Goal C N/A Notes:   [] 1 I will read the education binder provided to me. [x] []    [] 2 I will make my pschological evaluation appoinment. [x] []    [] 3 I will bring my binder to every appointment. [x] []    [] 4 I will eliminate all tobacco/nicotine. [] [x]    [] 5 I will limit alcoholic beverages to 3-8KX per week. [] [x]    [] 6 I will limit dining out to 3 times per week or less. [] [x]    [] 7 I will eliminate sugary beverages. [] [x]    [] 8 I will eliminate carbonated beverages. [] [x]    [] 9 I will eliminate drinking with a straw. [] [x]    [x] 10 I will limit caffeinated beverages to 16oz daily. [] []    [] 11 I will log my exercise daily.  [x] []    [x] 12 I
nausea, vomiting, abdominal pain, diarrhea, constipation, blood in stool and abdominal distention. Endocrine: Negative for polydipsia, polyphagia and polyuria. Genitourinary: Negative for dysuria, frequency, hematuria and difficulty urinating. Musculoskeletal: Negative for myalgias, joint swelling. Skin: Negative for pallor and rash. Neurological: Negative for dizziness, tremors, light-headedness and headaches. Psychiatric/Behavioral: Negative for sleep disturbance and dysphoric mood. Objective:      Physical Exam   Vital signs reviewed. General: Well-developed and well-nourished. No acute distress. Skin: Warm, dry and intact. HEENT: Normocephalic. EOMs intact. Conjunctivae normal. Neck supple. Cardiovascular: Normal rate, regular rhythm. Pulmonary/Chest: Normal effort. Lungs clear to auscultation. No rales, rhonchi or wheezing. Abdominal: Positive bowel sounds. Soft, nontender. Nondistended. Musculoskeletal: Movement x4. No edema. Neurological: Gait normal. Alert and oriented to person, place, and time. Psychiatric: Normal mood and affect. Speech and behavior normal. Judgment and thought content normal. Cognition and memory intact. Assessment:       Diagnosis Orders   1. Diabetes mellitus type 2 in obese [facility])  Urine Drug Screen      2. Bipolar disease, chronic (HCC)  Urine Drug Screen      3. Anxiety and depression  Urine Drug Screen      4. Obesity, Class III, BMI 40-49.9 (morbid obesity) (HCC)  Urine Drug Screen      5. PCOS (polycystic ovarian syndrome)  Urine Drug Screen      6. Fatty liver  Urine Drug Screen      7. Marijuana use            Plan:    Dietitian visit today. Patient was encouraged to journal all food intake. Keep calorie level at approximately 3290-6339. Protein intake is to be a minimum of 60-80 grams per day. Water drinking was encouraged with a goal of 64oz-128oz daily. Beverages to be calorie free except for milk. Every other beverage should be water.

## 2023-05-16 LAB — VIT B1 PYROPHOSHATE BLD-SCNC: 106 NMOL/L (ref 70–180)

## 2023-05-26 ENCOUNTER — ANESTHESIA EVENT (OUTPATIENT)
Dept: OPERATING ROOM | Age: 33
End: 2023-05-26
Payer: MEDICAID

## 2023-05-26 ENCOUNTER — ANESTHESIA (OUTPATIENT)
Dept: OPERATING ROOM | Age: 33
End: 2023-05-26
Payer: MEDICAID

## 2023-05-26 ENCOUNTER — HOSPITAL ENCOUNTER (OUTPATIENT)
Age: 33
Setting detail: OUTPATIENT SURGERY
Discharge: HOME | End: 2023-05-26
Attending: SURGERY | Admitting: SURGERY
Payer: MEDICAID

## 2023-05-26 VITALS
SYSTOLIC BLOOD PRESSURE: 107 MMHG | OXYGEN SATURATION: 100 % | HEART RATE: 75 BPM | RESPIRATION RATE: 18 BRPM | TEMPERATURE: 96.8 F | DIASTOLIC BLOOD PRESSURE: 70 MMHG

## 2023-05-26 DIAGNOSIS — K21.9 GASTROESOPHAGEAL REFLUX DISEASE, UNSPECIFIED WHETHER ESOPHAGITIS PRESENT: ICD-10-CM

## 2023-05-26 DIAGNOSIS — E66.01 MORBID OBESITY (HCC): ICD-10-CM

## 2023-05-26 LAB — HCG, PREGNANCY URINE (POC): NEGATIVE

## 2023-05-26 PROCEDURE — 2580000003 HC RX 258

## 2023-05-26 PROCEDURE — 3609012400 HC EGD TRANSORAL BIOPSY SINGLE/MULTIPLE: Performed by: SURGERY

## 2023-05-26 PROCEDURE — 7100000011 HC PHASE II RECOVERY - ADDTL 15 MIN: Performed by: SURGERY

## 2023-05-26 PROCEDURE — 6360000002 HC RX W HCPCS

## 2023-05-26 PROCEDURE — 81025 URINE PREGNANCY TEST: CPT

## 2023-05-26 PROCEDURE — 2709999900 HC NON-CHARGEABLE SUPPLY: Performed by: SURGERY

## 2023-05-26 PROCEDURE — 3700000000 HC ANESTHESIA ATTENDED CARE: Performed by: SURGERY

## 2023-05-26 PROCEDURE — 2500000003 HC RX 250 WO HCPCS

## 2023-05-26 PROCEDURE — 88305 TISSUE EXAM BY PATHOLOGIST: CPT

## 2023-05-26 PROCEDURE — 7100000010 HC PHASE II RECOVERY - FIRST 15 MIN: Performed by: SURGERY

## 2023-05-26 RX ORDER — SODIUM CHLORIDE, SODIUM LACTATE, POTASSIUM CHLORIDE, CALCIUM CHLORIDE 600; 310; 30; 20 MG/100ML; MG/100ML; MG/100ML; MG/100ML
INJECTION, SOLUTION INTRAVENOUS CONTINUOUS PRN
Status: DISCONTINUED | OUTPATIENT
Start: 2023-05-26 | End: 2023-05-26 | Stop reason: SDUPTHER

## 2023-05-26 RX ORDER — SODIUM CHLORIDE 9 MG/ML
25 INJECTION, SOLUTION INTRAVENOUS PRN
Status: DISCONTINUED | OUTPATIENT
Start: 2023-05-26 | End: 2023-05-26 | Stop reason: HOSPADM

## 2023-05-26 RX ORDER — METOCLOPRAMIDE HYDROCHLORIDE 5 MG/ML
10 INJECTION INTRAMUSCULAR; INTRAVENOUS
Status: DISCONTINUED | OUTPATIENT
Start: 2023-05-26 | End: 2023-05-26 | Stop reason: HOSPADM

## 2023-05-26 RX ORDER — SODIUM CHLORIDE 0.9 % (FLUSH) 0.9 %
5-40 SYRINGE (ML) INJECTION EVERY 12 HOURS SCHEDULED
Status: DISCONTINUED | OUTPATIENT
Start: 2023-05-26 | End: 2023-05-26 | Stop reason: HOSPADM

## 2023-05-26 RX ORDER — SODIUM CHLORIDE 0.9 % (FLUSH) 0.9 %
5-40 SYRINGE (ML) INJECTION PRN
Status: DISCONTINUED | OUTPATIENT
Start: 2023-05-26 | End: 2023-05-26 | Stop reason: HOSPADM

## 2023-05-26 RX ORDER — HYDRALAZINE HYDROCHLORIDE 20 MG/ML
10 INJECTION INTRAMUSCULAR; INTRAVENOUS
Status: DISCONTINUED | OUTPATIENT
Start: 2023-05-26 | End: 2023-05-26 | Stop reason: HOSPADM

## 2023-05-26 RX ORDER — MEPERIDINE HYDROCHLORIDE 50 MG/ML
12.5 INJECTION INTRAMUSCULAR; INTRAVENOUS; SUBCUTANEOUS EVERY 5 MIN PRN
Status: DISCONTINUED | OUTPATIENT
Start: 2023-05-26 | End: 2023-05-26 | Stop reason: HOSPADM

## 2023-05-26 RX ORDER — DROPERIDOL 2.5 MG/ML
0.62 INJECTION, SOLUTION INTRAMUSCULAR; INTRAVENOUS
Status: DISCONTINUED | OUTPATIENT
Start: 2023-05-26 | End: 2023-05-26 | Stop reason: HOSPADM

## 2023-05-26 RX ORDER — MIDAZOLAM HYDROCHLORIDE 1 MG/ML
INJECTION INTRAMUSCULAR; INTRAVENOUS PRN
Status: DISCONTINUED | OUTPATIENT
Start: 2023-05-26 | End: 2023-05-26 | Stop reason: SDUPTHER

## 2023-05-26 RX ORDER — LIDOCAINE HYDROCHLORIDE 10 MG/ML
INJECTION, SOLUTION EPIDURAL; INFILTRATION; INTRACAUDAL; PERINEURAL PRN
Status: DISCONTINUED | OUTPATIENT
Start: 2023-05-26 | End: 2023-05-26 | Stop reason: SDUPTHER

## 2023-05-26 RX ORDER — DIPHENHYDRAMINE HYDROCHLORIDE 50 MG/ML
12.5 INJECTION INTRAMUSCULAR; INTRAVENOUS
Status: DISCONTINUED | OUTPATIENT
Start: 2023-05-26 | End: 2023-05-26 | Stop reason: HOSPADM

## 2023-05-26 RX ORDER — PROPOFOL 10 MG/ML
INJECTION, EMULSION INTRAVENOUS PRN
Status: DISCONTINUED | OUTPATIENT
Start: 2023-05-26 | End: 2023-05-26 | Stop reason: SDUPTHER

## 2023-05-26 RX ADMIN — PROPOFOL 30 MG: 10 INJECTION, EMULSION INTRAVENOUS at 09:56

## 2023-05-26 RX ADMIN — PROPOFOL 20 MG: 10 INJECTION, EMULSION INTRAVENOUS at 10:00

## 2023-05-26 RX ADMIN — LIDOCAINE HYDROCHLORIDE 50 MG: 10 INJECTION, SOLUTION EPIDURAL; INFILTRATION; INTRACAUDAL; PERINEURAL at 09:55

## 2023-05-26 RX ADMIN — PROPOFOL 30 MG: 10 INJECTION, EMULSION INTRAVENOUS at 09:58

## 2023-05-26 RX ADMIN — PROPOFOL 60 MG: 10 INJECTION, EMULSION INTRAVENOUS at 09:55

## 2023-05-26 RX ADMIN — MIDAZOLAM 2 MG: 1 INJECTION INTRAMUSCULAR; INTRAVENOUS at 09:53

## 2023-05-26 RX ADMIN — SODIUM CHLORIDE, POTASSIUM CHLORIDE, SODIUM LACTATE AND CALCIUM CHLORIDE: 600; 310; 30; 20 INJECTION, SOLUTION INTRAVENOUS at 09:39

## 2023-05-26 ASSESSMENT — PAIN - FUNCTIONAL ASSESSMENT: PAIN_FUNCTIONAL_ASSESSMENT: 0-10

## 2023-05-26 NOTE — ANESTHESIA PRE PROCEDURE
Department of Anesthesiology  Preprocedure Note       Name:  Bhupinder Cardona   Age:  34 y.o.  :  1990                                          MRN:  [id number]         Date:  2023      Surgeon: Armando Aceves):  Ritu Cline DO    Procedure: Procedure(s):  EGD ESOPHAGOGASTRODUODENOSCOPY    Medications prior to admission:   Prior to Admission medications    Medication Sig Start Date End Date Taking? Authorizing Provider   vitamin D (ERGOCALCIFEROL) 1.25 MG (09274 UT) CAPS capsule Take 1 capsule by mouth once a week for 8 doses 5/15/23 7/4/23  Bre Alexandra APRN - CNP   ondansetron (ZOFRAN-ODT) 4 MG disintegrating tablet  23   Historical Provider, MD   Multiple Vitamin (MULTI-VITAMIN DAILY PO) Take by mouth    Historical Provider, MD   acyclovir (ZOVIRAX) 400 MG tablet TAKE 1 TABLET BY MOUTH FIVE TIMES A DAY X 5 DAYS  Patient not taking: Reported on 3/16/2023 12/14/22   Historical Provider, MD       Current medications:    No current facility-administered medications for this encounter. Allergies:  No Known Allergies    Problem List:    Patient Active Problem List   Diagnosis Code    Headache R51.9    HGSIL (high grade squamous intraepithelial lesion) on Pap smear of cervix R87.613    Bradycardia  likely vagal related R00.1    Bipolar disorder ([facility] [address] F31.9    Diabetes mellitus type 2 in obese ([facility]) E11.69, E66.9    Anxiety and depression F41.9, F32. A    Bipolar disease, chronic ([facility]) F31.9    Obesity, Class III, BMI 40-49.9 (morbid obesity) ([facility]) E66.01    PCOS (polycystic ovarian syndrome) E28.2    History of loop electrosurgical excision procedure (LEEP) of cervix Z98.890    Fatty liver K76.0    Marijuana use F12.90       Past Medical History:        Diagnosis Date    Allergy     seasonal    Anxiety     ASCUS with positive high risk HPV 2013    Asthma childhood    Bipolar disorder ([facility] [address]     Fatty liver 05/15/2023    Heart abnormality     HSIL on Pap smear 2012

## 2023-05-26 NOTE — OP NOTE
[facility] OR  [address]  [facility] [address]  Dept: [phone]  Loc: [phone]    Preoperative Diagnosis:  GERD, Morbid obesity, BMI of There is no height or weight on file to calculate BMI. Postoperative Diagnosis: GERD without esophagitis, Morbid obesity, BMI of There is no height or weight on file to calculate BMI. Procedure: Esophagogastroduodenoscopy with Biopsy    Surgeon: Sherwin Wall DO    Assistant:    Anesthesia: MAC, see anesthesia records    Specimen:    1) Antrum for H. Pylori    Findings:  No hiatal hernia  Mild antral gastritis  Normal duodenum  Normal esophagus    EBL: NONE    Operative Narrative: The risks and benefits were explained in detail to the patient who agreed and consented to the procedure. The patient was taken to the endoscopic suite and placed in a lateral position. Oxygen was administered via nasal cannula and a mouth guard was placed. MAC was administered via the anesthetic team.      The endoscope was then advanced into the oropharynx and down into the esophagus under direct visualization. The scope was further advanced through the esophagus, GE junction and stomach to the pylorus under visualization. The scope was passed through the pylorus and duodenal sweep performed, advancing and visualizing to the second portion of the duodenum. The scope was then withdrawn to the antrum and cold forceps were used to take biopsies of the antrum for H. Pylori. Appropriate hemostasis was noted. The scope was then retroflexed to visualize the GE junction. Evidence of a hiatal hernia was not noted. The scope was then slowly withdrawn through the GE junction. The Z line was noted. The stomach was then decompressed. The scope was withdrawn from the esophagus and no further lesions noted. The scope was withdrawn. The patient tolerated the procedure well. She will follow up with the Bariatric Clinic for further assessment.

## 2023-05-26 NOTE — DISCHARGE INSTRUCTIONS
POST-ENDOSCOPY INSTRUCTIONS    1. ACTIVITY   No driving, operating machinery, no alcohol, or making important decisions for 24 hours. Resume normal activity after 24 hours. You may return to work after 24 hours. 2. DIET    EGD: Resume your usual diet unless specified below. Diet Modification: Regular    3. MEDICATIONS  (Do not consume alcohol, tranquilizers, or sleeping medications for 24 hours unless advised by your physician)                 Resume your usual medications    4. PHYSICIAN FOLLOW-UP                 Please continue with your previously scheduled appointments                 See your primary care physician as planned. 6. NORMAL CHANGES YOU MAY EXPERIENCE AFTER ENDOSCOPY:      EGD:  Sore throat, some abdominal pain and cramping. 7. CALL YOUR PHYSICIAN IF YOU EXPERIENCE ANY OF THE FOLLOWING      A. Passing blood rectally or vomiting blood (color may be red or black)      B. Severe abdominal pain or tenderness (that is not relieved by passing air)      C.   Fever, chills, or excessive sweating      D.  Persistent nausea or vomiting      E.  Redness or swelling at the IV site    If you have additional questions, PLEASE call your doctor or the [facility] at [phone]

## 2023-05-26 NOTE — ANESTHESIA POSTPROCEDURE EVALUATION
POST- ANESTHESIA EVALUATION       Pt Name: Tong Bravo  MRN: [id number]  YOB: 1990  Date of evaluation: 5/26/2023  Time:  11:18 AM      /70   Pulse 75   Temp 96.8 °F (36 °C) (Temporal)   Resp 18   SpO2 100%      Consciousness Level  Awake  Cardiopulmonary Status  Stable  Pain Adequately Treated YES  Nausea / Vomiting  NO  Adequate Hydration  YES  Anesthesia Related Complications NONE      Electronically signed by Lindie Dakin, MD on 5/26/2023 at 11:18 AM       Department of Anesthesiology  Postprocedure Note    Patient: Tong Bravo  MRN: [id number]  [place]: 1990  Date of evaluation: 5/26/2023      Procedure Summary     Date: 05/26/23 Room / Location: [address]    Anesthesia Start: 9360 Anesthesia Stop: 1008    Procedure: EGD BIOPSY Diagnosis:       Morbid obesity ([address]      Gastroesophageal reflux disease, unspecified whether esophagitis present      (MORBID OBESITY, GERD)    Surgeons: Gisselle Hodges DO Responsible Provider: Lindie Dakin, MD    Anesthesia Type: MAC ASA Status: 3          Anesthesia Type: No value filed.     Sandrine Phase I: Sandrine Score: 10    Sandrine Phase II: Sandrine Score: 10      Anesthesia Post Evaluation

## 2023-05-26 NOTE — H&P
Subjective     The patient is a 34 y.o. female who is here to undergo EGD for GERD. There is no height or weight on file to calculate BMI. They are considering a bariatric procedure for morbid obesity. Past Medical History:   Diagnosis Date    Allergy     seasonal    Anxiety     ASCUS with positive high risk HPV 07/16/2013    Asthma childhood    Bipolar disorder ([facility] [address]     Fatty liver 05/15/2023    Heart abnormality     HSIL on Pap smear 08/16/2012    PCOS (polycystic ovarian syndrome)     Postpartum depression     Type AB blood, Rh positive    . Review of Systems - A complete 14 point review of systems was performed. All was negative unless otherwise documented in HPI. Allergies:  No Known Allergies    Past Surgical History:  Past Surgical History:   Procedure Laterality Date    DILATION AND CURETTAGE  age 17    miscarriage    DILATION AND CURETTAGE OF UTERUS  10/17/2018    Dr Arleth Meléndez W/WO D&C RPR ELTRD EXC N/A 10/17/2018    DILATATION AND CURETTAGE LEEP- ECC performed by Isadora Calvillo MD at [facility]       Family History:  Family History   Problem Relation Age of Onset    Other Father         Samuel's disease    Heart Disease Father     Diabetes Father         double amputee    Alcohol Abuse Father     Ovarian Cancer Paternal Grandmother        Social History:  Social History     Socioeconomic History    Marital status: Single     Spouse name: Not on file    Number of children: Not on file    Years of education: Not on file    Highest education level: Not on file   Occupational History     Employer: [employer]   Tobacco Use    Smoking status: Former     Types: Cigarettes    Smokeless tobacco: Never   Vaping Use    Vaping Use: Former    Substances: Never   Substance and Sexual Activity    Alcohol use:  Yes     Alcohol/week: 0.0 standard drinks     Comment: socially    Drug use: Yes     Types: Marijuana Jae Gourd)    Sexual activity: Yes     Partners: Male

## 2023-05-30 LAB — SURGICAL PATHOLOGY REPORT: NORMAL

## 2023-06-23 ENCOUNTER — HOSPITAL ENCOUNTER (OUTPATIENT)
Age: 33
Setting detail: SPECIMEN
Discharge: HOME | End: 2023-06-23

## 2023-06-23 ENCOUNTER — HOSPITAL ENCOUNTER (OUTPATIENT)
Age: 33
Discharge: HOME | End: 2023-06-23
Payer: MEDICAID

## 2023-06-23 ENCOUNTER — OFFICE VISIT (OUTPATIENT)
Dept: BARIATRICS/WEIGHT MGMT | Age: 33
End: 2023-06-23
Payer: MEDICAID

## 2023-06-23 VITALS
DIASTOLIC BLOOD PRESSURE: 80 MMHG | HEIGHT: 65 IN | HEART RATE: 80 BPM | WEIGHT: 247 LBS | SYSTOLIC BLOOD PRESSURE: 120 MMHG | BODY MASS INDEX: 41.15 KG/M2

## 2023-06-23 DIAGNOSIS — E66.9 DIABETES MELLITUS TYPE 2 IN OBESE (HCC): ICD-10-CM

## 2023-06-23 DIAGNOSIS — E11.69 DIABETES MELLITUS TYPE 2 IN OBESE (HCC): ICD-10-CM

## 2023-06-23 DIAGNOSIS — E28.2 PCOS (POLYCYSTIC OVARIAN SYNDROME): ICD-10-CM

## 2023-06-23 DIAGNOSIS — E66.9 DIABETES MELLITUS TYPE 2 IN OBESE (HCC): Primary | ICD-10-CM

## 2023-06-23 DIAGNOSIS — F31.9 BIPOLAR DISEASE, CHRONIC (HCC): ICD-10-CM

## 2023-06-23 DIAGNOSIS — F41.9 ANXIETY AND DEPRESSION: ICD-10-CM

## 2023-06-23 DIAGNOSIS — F32.A ANXIETY AND DEPRESSION: ICD-10-CM

## 2023-06-23 DIAGNOSIS — K76.0 FATTY LIVER: ICD-10-CM

## 2023-06-23 DIAGNOSIS — E11.69 DIABETES MELLITUS TYPE 2 IN OBESE (HCC): Primary | ICD-10-CM

## 2023-06-23 DIAGNOSIS — E66.01 OBESITY, CLASS III, BMI 40-49.9 (MORBID OBESITY) (HCC): ICD-10-CM

## 2023-06-23 PROBLEM — F12.90 MARIJUANA USE: Status: RESOLVED | Noted: 2023-05-15 | Resolved: 2023-06-23

## 2023-06-23 LAB
AMPHET UR QL SCN: NEGATIVE
BARBITURATES UR QL SCN: NEGATIVE
BENZODIAZ UR QL: NEGATIVE
BUPRENORPHINE UR QL: NEGATIVE
CANNABINOID SCREEN URINE: NEGATIVE
COCAINE UR QL SCN: NEGATIVE
FENTANYL URINE: NEGATIVE
METHADONE SCREEN, URINE: NEGATIVE
OPIATES UR QL SCN: NEGATIVE
OXYCODONE SCREEN URINE: NEGATIVE
PCP UR QL SCN: NEGATIVE
TEST INFORMATION: NORMAL

## 2023-06-23 PROCEDURE — 80307 DRUG TEST PRSMV CHEM ANLYZR: CPT

## 2023-06-23 PROCEDURE — 99213 OFFICE O/P EST LOW 20 MIN: CPT | Performed by: NURSE PRACTITIONER

## 2023-06-23 PROCEDURE — 3052F HG A1C>EQUAL 8.0%<EQUAL 9.0%: CPT | Performed by: NURSE PRACTITIONER

## 2023-06-23 RX ORDER — ACYCLOVIR 400 MG/1
TABLET ORAL
COMMUNITY
Start: 2022-12-14

## 2023-06-28 LAB
COTININE: <5 NG/ML
NICOTINE: <5 NG/ML

## 2023-10-04 ENCOUNTER — TELEPHONE (OUTPATIENT)
Dept: BARIATRICS/WEIGHT MGMT | Age: 33
End: 2023-10-04

## 2023-10-11 RX ORDER — SODIUM CHLORIDE, SODIUM LACTATE, POTASSIUM CHLORIDE, CALCIUM CHLORIDE 600; 310; 30; 20 MG/100ML; MG/100ML; MG/100ML; MG/100ML
INJECTION, SOLUTION INTRAVENOUS CONTINUOUS
OUTPATIENT
Start: 2023-10-11

## 2023-10-11 NOTE — DISCHARGE INSTRUCTIONS
Preoperative Instructions:    Stop drinking clear liquids at midnight the night prior to surgery. **gatorade per surgeon  (Follow preop diet instructions as instructed by your surgeon.)    Donte Shine at the surgery center (Entrance B) by 7:20 on 11/1/2023  (or as directed by your surgeon's office). Please stop any blood thinning medications as directed by your surgeon or prescribing physician. Failure to stop certain medications may interfere with your scheduled surgery. These may include:  Aspirin, Warfarin (Coumadin), Clopidogrel (Plavix), Ibuprofen (Motrin, Advil), Naproxen (Aleve), Meloxicam (Mobic), Celecoxib (Celebrex), Eliquis, Pradaxa, Xarelto, Effient, Fish Oil, Herbal supplements. You may continue the rest of your medications through the night before surgery unless instructed otherwise. Please take only the following medication(s) the day of surgery with a small sip of water:    Please use and bring inhalers the day of surgery. Please bring CPAP the day of surgery. PLEASE NOTE:  THE ABOVE (IF ANY) DISCONTINUED MEDS MAY ONLY BE FROM   \"CLEANING UP\" THE MED LIST AND WERE NOT ACTUALLY CANCELLED; SEE CHART FOR DETAILS AND ALWAYS CHECK WITH PRESCRIBING PROVIDER BEFORE DISCONTINUING ANY MEDICATIONS          ____________________________  ____________________________  Signature (Patient)           Signature/date(Provider)      REMINDERS:  ** If you are going home the day of your procedure, you will need a friend or family member to drive you home after your procedure. Your  must be 25years of age or older and able to sign off on your discharge instructions. Taxi cabs or any form of public transportation is not acceptable. ** It is preferable that the friend or family member stay at the hospital throughout your procedure. ** If you are going home the same day as your procedure, someone must remain with you for the first 24 hours after your surgery if you receive anesthesia or sedation.   If

## 2023-10-16 ENCOUNTER — HOSPITAL ENCOUNTER (OUTPATIENT)
Dept: GENERAL RADIOLOGY | Age: 33
Discharge: HOME | End: 2023-10-18
Payer: COMMERCIAL

## 2023-10-16 ENCOUNTER — NURSE ONLY (OUTPATIENT)
Dept: BARIATRICS/WEIGHT MGMT | Age: 33
End: 2023-10-16

## 2023-10-16 ENCOUNTER — HOSPITAL ENCOUNTER (OUTPATIENT)
Dept: PREADMISSION TESTING | Age: 33
Discharge: HOME | End: 2023-10-20
Payer: COMMERCIAL

## 2023-10-16 VITALS
DIASTOLIC BLOOD PRESSURE: 62 MMHG | OXYGEN SATURATION: 96 % | HEIGHT: 65 IN | RESPIRATION RATE: 16 BRPM | BODY MASS INDEX: 41.32 KG/M2 | TEMPERATURE: 98.2 F | WEIGHT: 248 LBS | SYSTOLIC BLOOD PRESSURE: 110 MMHG | HEART RATE: 53 BPM

## 2023-10-16 LAB
ANION GAP SERPL CALCULATED.3IONS-SCNC: 11 MMOL/L (ref 9–16)
BUN SERPL-MCNC: 16 MG/DL (ref 6–20)
CALCIUM SERPL-MCNC: 8.7 MG/DL (ref 8.6–10.4)
CHLORIDE SERPL-SCNC: 104 MMOL/L (ref 98–107)
CO2 SERPL-SCNC: 24 MMOL/L (ref 20–31)
CREAT SERPL-MCNC: 0.7 MG/DL (ref 0.5–0.9)
ERYTHROCYTE [DISTWIDTH] IN BLOOD BY AUTOMATED COUNT: 13.2 % (ref 11.8–14.4)
GFR SERPL CREATININE-BSD FRML MDRD: >60 ML/MIN/1.73M2
GLUCOSE SERPL-MCNC: 159 MG/DL (ref 74–99)
HCT VFR BLD AUTO: 44.4 % (ref 36.3–47.1)
HGB BLD-MCNC: 14.8 G/DL (ref 11.9–15.1)
INR PPP: 1
MCH RBC QN AUTO: 30.7 PG (ref 25.2–33.5)
MCHC RBC AUTO-ENTMCNC: 33.3 G/DL (ref 28.4–34.8)
MCV RBC AUTO: 92.1 FL (ref 82.6–102.9)
NRBC BLD-RTO: 0 PER 100 WBC
PARTIAL THROMBOPLASTIN TIME: 25 SEC (ref 23–36.5)
PLATELET # BLD AUTO: 215 K/UL (ref 138–453)
PMV BLD AUTO: 9.4 FL (ref 8.1–13.5)
POTASSIUM SERPL-SCNC: 3.8 MMOL/L (ref 3.7–5.3)
PROTHROMBIN TIME: 12.5 SEC (ref 11.7–14.9)
RBC # BLD AUTO: 4.82 M/UL (ref 3.95–5.11)
SODIUM SERPL-SCNC: 139 MMOL/L (ref 136–145)
WBC OTHER # BLD: 5 K/UL (ref 3.5–11.3)

## 2023-10-16 PROCEDURE — G0480 DRUG TEST DEF 1-7 CLASSES: HCPCS

## 2023-10-16 PROCEDURE — 93005 ELECTROCARDIOGRAM TRACING: CPT | Performed by: SURGERY

## 2023-10-16 PROCEDURE — 36415 COLL VENOUS BLD VENIPUNCTURE: CPT

## 2023-10-16 PROCEDURE — 85027 COMPLETE CBC AUTOMATED: CPT

## 2023-10-16 PROCEDURE — 80048 BASIC METABOLIC PNL TOTAL CA: CPT

## 2023-10-16 PROCEDURE — 71046 X-RAY EXAM CHEST 2 VIEWS: CPT

## 2023-10-16 PROCEDURE — 85610 PROTHROMBIN TIME: CPT

## 2023-10-16 PROCEDURE — 85730 THROMBOPLASTIN TIME PARTIAL: CPT

## 2023-10-16 RX ORDER — HEPARIN SODIUM 5000 [USP'U]/ML
5000 INJECTION, SOLUTION INTRAVENOUS; SUBCUTANEOUS ONCE
OUTPATIENT
Start: 2023-10-16

## 2023-10-16 NOTE — PROGRESS NOTES
Anesthesia Focused Assessment      STOP-BANG Sleep Apnea Questionnaire    SNORE loudly (heard through closed doors)? No  TIRED, fatigued, sleepy during daytime? No  OBSERVED stopping breathing during sleep? No  High blood PRESSURE being treated? No    BMI over 35? Yes  AGE over 48? No  NECK circumference over 16\"? No  GENDER (male)? No             Total 1  High risk 5-8  Intermediate risk 3-4  Low risk 0-2    Obstructive Sleep Apnea: denies  If YES, machine used: no     Type 1 DM:   no  T2DM:  diet controlled    Coronary Artery Disease:  no  Hypertension:  no    Active smoker:  quit 2012  Drinks Alcohol:  socially  Marijuana socially/occasionally    Dentition: benign    Defib / AICD / Pacemaker: no      Renal Failure/dialysis:  no    Patient was evaluated in PAT & anesthesia guidelines were applied. NPO guidelines, medication instructions and scheduled arrival time were reviewed with patient. I advised patient to please contact the surgeon's office, ahead of time if possible, if any new signs or symptoms of illness, infection, rash, etc    Hx of anesthesia complications:  prolonged emergence from anesthesia. Family hx of anesthesia complications:  father has prolonged emergence from anesthesia. PCP clearance pending, will request copy for chart. Patient is currently without cardiac or pulmonary complaints. Cardiology notes on file from [facility] Cardiology from several years ago, suspected NCGS, patient says was discharged from follow up need, no symptoms.     Roman May PA-C  10/16/23  1:11 PM

## 2023-10-16 NOTE — H&P (VIEW-ONLY)
History and Physical    Pt Name: Akosua Godoy  MRN: [id number]  YOB: 1990  Date of evaluation: 10/16/2023    SUBJECTIVE:   History of Chief Complaint:    Patient presents for PAT appointment. She reports difficulty with ability to lose weight and then also maintain that weight loss despite attempts in the past.  She has several comorbid conditions that would be helped with weight loss. Patient has been following with bariatrics and has been scheduled for ROBOTIC LAPAROSCOPIC GASTRECTOMY SLEEVE, EGD, LIVER BIOPSY. Past Medical History    has a past medical history of Allergy, Anxiety, ASCUS with positive high risk HPV, Asthma, Bipolar disorder ([address]), COVID, Diabetes mellitus ([address]), Fatty liver, HSIL on Pap smear, Irregular heartbeat, Neurocardiogenic syncope, PCOS (polycystic ovarian syndrome), Postpartum depression, Sleep apnea, Type AB blood, Rh positive, Under care of team, and Under care of team.  Past Surgical History   has a past surgical history that includes Dilation & curettage (age 17); Dilation and curettage of uterus (10/17/2018); pr conization cervix w/wo d&c rpr eltrd exc (N/A, 10/17/2018); Esophagogastroduodenoscopy (05/26/2023); and Upper gastrointestinal endoscopy (N/A, 05/26/2023). Medications  Prior to Admission medications    Medication Sig Start Date End Date Taking?  Authorizing Provider   albuterol sulfate HFA (VENTOLIN HFA) 108 (90 Base) MCG/ACT inhaler Inhale 2 puffs into the lungs 4 times daily as needed for Wheezing 9/20/23   Summer Garcia APRN - CNP   predniSONE (DELTASONE) 10 MG tablet Take 4 tabs for 4 days, 3 tabs for 4 days, 2 tabs for 4 days and 1 tab for 4 days 9/20/23   NOREEN Noel CNP   acyclovir (ZOVIRAX) 400 MG tablet Take by mouth  Patient not taking: Reported on 6/23/2023 12/14/22   Provider, MD Yuliana   vitamin D (ERGOCALCIFEROL) 1.25 MG (96080 UT) CAPS capsule Take 1 capsule by mouth once a week for 8 doses 5/15/23 7/4/23  Lacie Antoine,

## 2023-10-17 LAB
EKG ATRIAL RATE: 58 BPM
EKG P AXIS: 33 DEGREES
EKG P-R INTERVAL: 148 MS
EKG Q-T INTERVAL: 470 MS
EKG QRS DURATION: 100 MS
EKG QTC CALCULATION (BAZETT): 461 MS
EKG R AXIS: 43 DEGREES
EKG T AXIS: 11 DEGREES
EKG VENTRICULAR RATE: 58 BPM

## 2023-10-17 PROCEDURE — 93010 ELECTROCARDIOGRAM REPORT: CPT | Performed by: INTERNAL MEDICINE

## 2023-10-19 LAB
COTININE: <5 NG/ML
NICOTINE: <5 NG/ML

## 2023-10-27 ENCOUNTER — OFFICE VISIT (OUTPATIENT)
Dept: BARIATRICS/WEIGHT MGMT | Age: 33
End: 2023-10-27
Payer: COMMERCIAL

## 2023-10-27 VITALS
OXYGEN SATURATION: 98 % | SYSTOLIC BLOOD PRESSURE: 110 MMHG | BODY MASS INDEX: 39.99 KG/M2 | HEART RATE: 78 BPM | HEIGHT: 65 IN | WEIGHT: 240 LBS | DIASTOLIC BLOOD PRESSURE: 70 MMHG

## 2023-10-27 DIAGNOSIS — K76.0 FATTY LIVER: Primary | ICD-10-CM

## 2023-10-27 PROCEDURE — 99214 OFFICE O/P EST MOD 30 MIN: CPT | Performed by: SURGERY

## 2023-10-27 PROCEDURE — G8484 FLU IMMUNIZE NO ADMIN: HCPCS | Performed by: SURGERY

## 2023-10-27 PROCEDURE — 1036F TOBACCO NON-USER: CPT | Performed by: SURGERY

## 2023-10-27 PROCEDURE — G8427 DOCREV CUR MEDS BY ELIG CLIN: HCPCS | Performed by: SURGERY

## 2023-10-27 PROCEDURE — G8417 CALC BMI ABV UP PARAM F/U: HCPCS | Performed by: SURGERY

## 2023-10-27 NOTE — PROGRESS NOTES
no monitor in long term  Medical and surgical options discussed    GERD  Risks of GERD discussed  Potential need for PPI discussed  Prior EGD reviewed     Morbid Obesity  Surgical and medical options for weight loss given  Need for long term diet and exercise discussed to sustain weight loss  Decision for surgery  Need to monitor     Scribe Attestation:  IGisell, scribed on behalf of Javier Mayer DO. Electronically signed by Javier Mayer DO on 11/2/2023 at 8:08 PM    Please note that this chart was generated using voice recognition Dragon dictation software. Although every effort was made to ensure the accuracy of this automated transcription, some errors in transcription may have occurred. Chelsi Gregorio DO DO, personally performed the services described in this documentation. All medical record entries made by the scribe were at my direction and in my presence. I have reviewed the chart and discharge instructions (if applicable) and agree that the record reflects my personal performance and is accurate and complete.     Electronically Signed: Javier Mayer DO. 11/02/23. 8:08 PM.

## 2023-11-01 ENCOUNTER — ANESTHESIA EVENT (OUTPATIENT)
Dept: OPERATING ROOM | Age: 33
DRG: 403 | End: 2023-11-01
Payer: COMMERCIAL

## 2023-11-01 ENCOUNTER — ANESTHESIA (OUTPATIENT)
Dept: OPERATING ROOM | Age: 33
DRG: 403 | End: 2023-11-01
Payer: COMMERCIAL

## 2023-11-01 ENCOUNTER — HOSPITAL ENCOUNTER (INPATIENT)
Age: 33
LOS: 1 days | Discharge: HOME | DRG: 403 | End: 2023-11-02
Attending: SURGERY | Admitting: SURGERY
Payer: COMMERCIAL

## 2023-11-01 DIAGNOSIS — E13.69 OTHER SPECIFIED DIABETES MELLITUS WITH OTHER SPECIFIED COMPLICATION, UNSPECIFIED WHETHER LONG TERM INSULIN USE (HCC): ICD-10-CM

## 2023-11-01 DIAGNOSIS — G89.18 ACUTE POST-OPERATIVE PAIN: Primary | ICD-10-CM

## 2023-11-01 DIAGNOSIS — E28.2 PCOS (POLYCYSTIC OVARIAN SYNDROME): ICD-10-CM

## 2023-11-01 DIAGNOSIS — E66.01 MORBID OBESITY (HCC): ICD-10-CM

## 2023-11-01 DIAGNOSIS — Z98.84 S/P LAPAROSCOPIC SLEEVE GASTRECTOMY: ICD-10-CM

## 2023-11-01 LAB
ANION GAP SERPL CALCULATED.3IONS-SCNC: 12 MMOL/L (ref 9–17)
BUN SERPL-MCNC: 12 MG/DL (ref 6–20)
CALCIUM SERPL-MCNC: 8.3 MG/DL (ref 8.6–10.4)
CHLORIDE SERPL-SCNC: 103 MMOL/L (ref 98–107)
CO2 SERPL-SCNC: 22 MMOL/L (ref 20–31)
CREAT SERPL-MCNC: 0.6 MG/DL (ref 0.5–0.9)
ERYTHROCYTE [DISTWIDTH] IN BLOOD BY AUTOMATED COUNT: 12.7 % (ref 11.8–14.4)
GFR SERPL CREATININE-BSD FRML MDRD: >60 ML/MIN/1.73M2
GLUCOSE BLD-MCNC: 148 MG/DL (ref 65–105)
GLUCOSE SERPL-MCNC: 193 MG/DL (ref 70–99)
HCG, PREGNANCY URINE (POC): NEGATIVE
HCT VFR BLD AUTO: 41.3 % (ref 36.3–47.1)
HGB BLD-MCNC: 14 G/DL (ref 11.9–15.1)
MCH RBC QN AUTO: 30.9 PG (ref 25.2–33.5)
MCHC RBC AUTO-ENTMCNC: 33.9 G/DL (ref 28.4–34.8)
MCV RBC AUTO: 91.2 FL (ref 82.6–102.9)
NRBC BLD-RTO: 0 PER 100 WBC
PLATELET # BLD AUTO: NORMAL K/UL (ref 138–453)
PLATELET, FLUORESCENCE: NORMAL K/UL (ref 138–453)
POTASSIUM SERPL-SCNC: 3.6 MMOL/L (ref 3.7–5.3)
RBC # BLD AUTO: 4.53 M/UL (ref 3.95–5.11)
SODIUM SERPL-SCNC: 137 MMOL/L (ref 135–144)
WBC OTHER # BLD: 8.5 K/UL (ref 3.5–11.3)

## 2023-11-01 PROCEDURE — 0DB64Z3 EXCISION OF STOMACH, PERCUTANEOUS ENDOSCOPIC APPROACH, VERTICAL: ICD-10-PCS | Performed by: SURGERY

## 2023-11-01 PROCEDURE — 85027 COMPLETE CBC AUTOMATED: CPT

## 2023-11-01 PROCEDURE — 2500000003 HC RX 250 WO HCPCS: Performed by: STUDENT IN AN ORGANIZED HEALTH CARE EDUCATION/TRAINING PROGRAM

## 2023-11-01 PROCEDURE — 80048 BASIC METABOLIC PNL TOTAL CA: CPT

## 2023-11-01 PROCEDURE — 2580000003 HC RX 258: Performed by: STUDENT IN AN ORGANIZED HEALTH CARE EDUCATION/TRAINING PROGRAM

## 2023-11-01 PROCEDURE — 1200000000 HC SEMI PRIVATE

## 2023-11-01 PROCEDURE — 6370000000 HC RX 637 (ALT 250 FOR IP): Performed by: SURGERY

## 2023-11-01 PROCEDURE — 85055 RETICULATED PLATELET ASSAY: CPT

## 2023-11-01 PROCEDURE — 6360000002 HC RX W HCPCS: Performed by: SURGERY

## 2023-11-01 PROCEDURE — 0DJ08ZZ INSPECTION OF UPPER INTESTINAL TRACT, VIA NATURAL OR ARTIFICIAL OPENING ENDOSCOPIC: ICD-10-PCS | Performed by: SURGERY

## 2023-11-01 PROCEDURE — 3700000000 HC ANESTHESIA ATTENDED CARE: Performed by: SURGERY

## 2023-11-01 PROCEDURE — 2580000003 HC RX 258: Performed by: SURGERY

## 2023-11-01 PROCEDURE — 3600000019 HC SURGERY ROBOT ADDTL 15MIN: Performed by: SURGERY

## 2023-11-01 PROCEDURE — S2900 ROBOTIC SURGICAL SYSTEM: HCPCS | Performed by: SURGERY

## 2023-11-01 PROCEDURE — 2709999900 HC NON-CHARGEABLE SUPPLY: Performed by: SURGERY

## 2023-11-01 PROCEDURE — 2720000010 HC SURG SUPPLY STERILE: Performed by: SURGERY

## 2023-11-01 PROCEDURE — 2580000003 HC RX 258: Performed by: ANESTHESIOLOGY

## 2023-11-01 PROCEDURE — 3600000009 HC SURGERY ROBOT BASE: Performed by: SURGERY

## 2023-11-01 PROCEDURE — 82947 ASSAY GLUCOSE BLOOD QUANT: CPT

## 2023-11-01 PROCEDURE — 3700000001 HC ADD 15 MINUTES (ANESTHESIA): Performed by: SURGERY

## 2023-11-01 PROCEDURE — 7100000001 HC PACU RECOVERY - ADDTL 15 MIN: Performed by: SURGERY

## 2023-11-01 PROCEDURE — 7100000000 HC PACU RECOVERY - FIRST 15 MIN: Performed by: SURGERY

## 2023-11-01 PROCEDURE — 0BQT4ZZ REPAIR DIAPHRAGM, PERCUTANEOUS ENDOSCOPIC APPROACH: ICD-10-PCS | Performed by: SURGERY

## 2023-11-01 PROCEDURE — 8E0W4CZ ROBOTIC ASSISTED PROCEDURE OF TRUNK REGION, PERCUTANEOUS ENDOSCOPIC APPROACH: ICD-10-PCS | Performed by: SURGERY

## 2023-11-01 PROCEDURE — 88307 TISSUE EXAM BY PATHOLOGIST: CPT

## 2023-11-01 PROCEDURE — 6360000002 HC RX W HCPCS: Performed by: STUDENT IN AN ORGANIZED HEALTH CARE EDUCATION/TRAINING PROGRAM

## 2023-11-01 PROCEDURE — 81025 URINE PREGNANCY TEST: CPT

## 2023-11-01 RX ORDER — PHENYLEPHRINE HCL IN 0.9% NACL 50MG/250ML
PLASTIC BAG, INJECTION (ML) INTRAVENOUS CONTINUOUS PRN
Status: DISCONTINUED | OUTPATIENT
Start: 2023-11-01 | End: 2023-11-01 | Stop reason: SDUPTHER

## 2023-11-01 RX ORDER — LIDOCAINE HYDROCHLORIDE 10 MG/ML
INJECTION, SOLUTION EPIDURAL; INFILTRATION; INTRACAUDAL; PERINEURAL PRN
Status: DISCONTINUED | OUTPATIENT
Start: 2023-11-01 | End: 2023-11-01 | Stop reason: SDUPTHER

## 2023-11-01 RX ORDER — HEPARIN SODIUM 5000 [USP'U]/ML
5000 INJECTION, SOLUTION INTRAVENOUS; SUBCUTANEOUS EVERY 8 HOURS SCHEDULED
Status: DISCONTINUED | OUTPATIENT
Start: 2023-11-01 | End: 2023-11-02 | Stop reason: HOSPADM

## 2023-11-01 RX ORDER — SCOLOPAMINE TRANSDERMAL SYSTEM 1 MG/1
1 PATCH, EXTENDED RELEASE TRANSDERMAL
Status: DISCONTINUED | OUTPATIENT
Start: 2023-11-01 | End: 2023-11-02 | Stop reason: HOSPADM

## 2023-11-01 RX ORDER — GABAPENTIN 100 MG/1
100 CAPSULE ORAL 3 TIMES DAILY
Status: DISCONTINUED | OUTPATIENT
Start: 2023-11-01 | End: 2023-11-02 | Stop reason: HOSPADM

## 2023-11-01 RX ORDER — ROCURONIUM BROMIDE 10 MG/ML
INJECTION, SOLUTION INTRAVENOUS PRN
Status: DISCONTINUED | OUTPATIENT
Start: 2023-11-01 | End: 2023-11-01 | Stop reason: SDUPTHER

## 2023-11-01 RX ORDER — ONDANSETRON 2 MG/ML
4 INJECTION INTRAMUSCULAR; INTRAVENOUS
Status: COMPLETED | OUTPATIENT
Start: 2023-11-01 | End: 2023-11-01

## 2023-11-01 RX ORDER — SODIUM CHLORIDE 0.9 % (FLUSH) 0.9 %
5-40 SYRINGE (ML) INJECTION EVERY 12 HOURS SCHEDULED
Status: DISCONTINUED | OUTPATIENT
Start: 2023-11-01 | End: 2023-11-01 | Stop reason: HOSPADM

## 2023-11-01 RX ORDER — SODIUM CHLORIDE 9 MG/ML
INJECTION, SOLUTION INTRAVENOUS PRN
Status: DISCONTINUED | OUTPATIENT
Start: 2023-11-01 | End: 2023-11-01 | Stop reason: HOSPADM

## 2023-11-01 RX ORDER — DEXAMETHASONE SODIUM PHOSPHATE 10 MG/ML
INJECTION INTRAMUSCULAR; INTRAVENOUS PRN
Status: DISCONTINUED | OUTPATIENT
Start: 2023-11-01 | End: 2023-11-01 | Stop reason: SDUPTHER

## 2023-11-01 RX ORDER — PROMETHAZINE HYDROCHLORIDE 25 MG/1
25 TABLET ORAL EVERY 6 HOURS PRN
Status: DISCONTINUED | OUTPATIENT
Start: 2023-11-01 | End: 2023-11-02 | Stop reason: HOSPADM

## 2023-11-01 RX ORDER — BUPIVACAINE HYDROCHLORIDE 5 MG/ML
INJECTION, SOLUTION PERINEURAL PRN
Status: DISCONTINUED | OUTPATIENT
Start: 2023-11-01 | End: 2023-11-01 | Stop reason: HOSPADM

## 2023-11-01 RX ORDER — IPRATROPIUM BROMIDE AND ALBUTEROL SULFATE 2.5; .5 MG/3ML; MG/3ML
1 SOLUTION RESPIRATORY (INHALATION)
Status: DISCONTINUED | OUTPATIENT
Start: 2023-11-01 | End: 2023-11-02 | Stop reason: HOSPADM

## 2023-11-01 RX ORDER — HEPARIN SODIUM 5000 [USP'U]/ML
5000 INJECTION, SOLUTION INTRAVENOUS; SUBCUTANEOUS ONCE
Status: COMPLETED | OUTPATIENT
Start: 2023-11-01 | End: 2023-11-01

## 2023-11-01 RX ORDER — MAGNESIUM HYDROXIDE 1200 MG/15ML
LIQUID ORAL CONTINUOUS PRN
Status: DISCONTINUED | OUTPATIENT
Start: 2023-11-01 | End: 2023-11-01 | Stop reason: HOSPADM

## 2023-11-01 RX ORDER — SODIUM CHLORIDE 0.9 % (FLUSH) 0.9 %
5-40 SYRINGE (ML) INJECTION PRN
Status: DISCONTINUED | OUTPATIENT
Start: 2023-11-01 | End: 2023-11-01 | Stop reason: HOSPADM

## 2023-11-01 RX ORDER — FENTANYL CITRATE 50 UG/ML
INJECTION, SOLUTION INTRAMUSCULAR; INTRAVENOUS PRN
Status: DISCONTINUED | OUTPATIENT
Start: 2023-11-01 | End: 2023-11-01 | Stop reason: SDUPTHER

## 2023-11-01 RX ORDER — SODIUM CHLORIDE, SODIUM LACTATE, POTASSIUM CHLORIDE, CALCIUM CHLORIDE 600; 310; 30; 20 MG/100ML; MG/100ML; MG/100ML; MG/100ML
INJECTION, SOLUTION INTRAVENOUS CONTINUOUS
Status: DISCONTINUED | OUTPATIENT
Start: 2023-11-01 | End: 2023-11-01 | Stop reason: HOSPADM

## 2023-11-01 RX ORDER — HYDRALAZINE HYDROCHLORIDE 20 MG/ML
10 INJECTION INTRAMUSCULAR; INTRAVENOUS
Status: DISCONTINUED | OUTPATIENT
Start: 2023-11-01 | End: 2023-11-01 | Stop reason: HOSPADM

## 2023-11-01 RX ORDER — OXYCODONE HYDROCHLORIDE AND ACETAMINOPHEN 5; 325 MG/1; MG/1
2 TABLET ORAL EVERY 6 HOURS PRN
Status: DISCONTINUED | OUTPATIENT
Start: 2023-11-01 | End: 2023-11-02 | Stop reason: HOSPADM

## 2023-11-01 RX ORDER — SODIUM CHLORIDE, SODIUM LACTATE, POTASSIUM CHLORIDE, CALCIUM CHLORIDE 600; 310; 30; 20 MG/100ML; MG/100ML; MG/100ML; MG/100ML
INJECTION, SOLUTION INTRAVENOUS CONTINUOUS PRN
Status: DISCONTINUED | OUTPATIENT
Start: 2023-11-01 | End: 2023-11-01 | Stop reason: SDUPTHER

## 2023-11-01 RX ORDER — CYCLOBENZAPRINE HCL 10 MG
10 TABLET ORAL 3 TIMES DAILY PRN
Status: DISCONTINUED | OUTPATIENT
Start: 2023-11-01 | End: 2023-11-02 | Stop reason: HOSPADM

## 2023-11-01 RX ORDER — SODIUM CHLORIDE, SODIUM LACTATE, POTASSIUM CHLORIDE, CALCIUM CHLORIDE 600; 310; 30; 20 MG/100ML; MG/100ML; MG/100ML; MG/100ML
INJECTION, SOLUTION INTRAVENOUS CONTINUOUS
Status: DISCONTINUED | OUTPATIENT
Start: 2023-11-01 | End: 2023-11-02

## 2023-11-01 RX ORDER — OXYCODONE HYDROCHLORIDE AND ACETAMINOPHEN 5; 325 MG/1; MG/1
1 TABLET ORAL EVERY 6 HOURS PRN
Status: DISCONTINUED | OUTPATIENT
Start: 2023-11-01 | End: 2023-11-02 | Stop reason: HOSPADM

## 2023-11-01 RX ORDER — ONDANSETRON 2 MG/ML
INJECTION INTRAMUSCULAR; INTRAVENOUS PRN
Status: DISCONTINUED | OUTPATIENT
Start: 2023-11-01 | End: 2023-11-01 | Stop reason: SDUPTHER

## 2023-11-01 RX ORDER — PHENYLEPHRINE HCL IN 0.9% NACL 1 MG/10 ML
SYRINGE (ML) INTRAVENOUS PRN
Status: DISCONTINUED | OUTPATIENT
Start: 2023-11-01 | End: 2023-11-01 | Stop reason: SDUPTHER

## 2023-11-01 RX ORDER — PROPOFOL 10 MG/ML
INJECTION, EMULSION INTRAVENOUS PRN
Status: DISCONTINUED | OUTPATIENT
Start: 2023-11-01 | End: 2023-11-01 | Stop reason: SDUPTHER

## 2023-11-01 RX ADMIN — ONDANSETRON 4 MG: 2 INJECTION INTRAMUSCULAR; INTRAVENOUS at 12:09

## 2023-11-01 RX ADMIN — SODIUM CHLORIDE, POTASSIUM CHLORIDE, SODIUM LACTATE AND CALCIUM CHLORIDE: 600; 310; 30; 20 INJECTION, SOLUTION INTRAVENOUS at 08:11

## 2023-11-01 RX ADMIN — OXYCODONE AND ACETAMINOPHEN 2 TABLET: 5; 325 TABLET ORAL at 16:53

## 2023-11-01 RX ADMIN — HYDROMORPHONE HYDROCHLORIDE 0.5 MG: 1 INJECTION, SOLUTION INTRAMUSCULAR; INTRAVENOUS; SUBCUTANEOUS at 14:01

## 2023-11-01 RX ADMIN — IPRATROPIUM BROMIDE AND ALBUTEROL SULFATE 1 DOSE: 2.5; .5 SOLUTION RESPIRATORY (INHALATION) at 14:36

## 2023-11-01 RX ADMIN — HYDROMORPHONE HYDROCHLORIDE 0.5 MG: 1 INJECTION, SOLUTION INTRAMUSCULAR; INTRAVENOUS; SUBCUTANEOUS at 13:46

## 2023-11-01 RX ADMIN — CYCLOBENZAPRINE 10 MG: 10 TABLET, FILM COATED ORAL at 14:45

## 2023-11-01 RX ADMIN — HYDROMORPHONE HYDROCHLORIDE 0.5 MG: 1 INJECTION, SOLUTION INTRAMUSCULAR; INTRAVENOUS; SUBCUTANEOUS at 13:10

## 2023-11-01 RX ADMIN — LIDOCAINE HYDROCHLORIDE 50 MG: 10 INJECTION, SOLUTION EPIDURAL; INFILTRATION; INTRACAUDAL; PERINEURAL at 10:04

## 2023-11-01 RX ADMIN — Medication 2000 MG: at 22:39

## 2023-11-01 RX ADMIN — OXYCODONE AND ACETAMINOPHEN 2 TABLET: 5; 325 TABLET ORAL at 22:46

## 2023-11-01 RX ADMIN — FENTANYL CITRATE 150 MCG: 0.05 INJECTION, SOLUTION INTRAMUSCULAR; INTRAVENOUS at 10:04

## 2023-11-01 RX ADMIN — Medication 25 MCG/MIN: at 10:19

## 2023-11-01 RX ADMIN — SODIUM CHLORIDE, POTASSIUM CHLORIDE, SODIUM LACTATE AND CALCIUM CHLORIDE: 600; 310; 30; 20 INJECTION, SOLUTION INTRAVENOUS at 14:41

## 2023-11-01 RX ADMIN — SUGAMMADEX 300 MG: 100 INJECTION, SOLUTION INTRAVENOUS at 12:32

## 2023-11-01 RX ADMIN — ROCURONIUM BROMIDE 50 MG: 10 INJECTION, SOLUTION INTRAVENOUS at 10:04

## 2023-11-01 RX ADMIN — PROPOFOL 200 MG: 10 INJECTION, EMULSION INTRAVENOUS at 10:04

## 2023-11-01 RX ADMIN — HEPARIN SODIUM 5000 UNITS: 5000 INJECTION INTRAVENOUS; SUBCUTANEOUS at 22:39

## 2023-11-01 RX ADMIN — HYDROMORPHONE HYDROCHLORIDE 0.5 MG: 1 INJECTION, SOLUTION INTRAMUSCULAR; INTRAVENOUS; SUBCUTANEOUS at 13:00

## 2023-11-01 RX ADMIN — HYDROMORPHONE HYDROCHLORIDE 0.25 MG: 1 INJECTION, SOLUTION INTRAMUSCULAR; INTRAVENOUS; SUBCUTANEOUS at 14:30

## 2023-11-01 RX ADMIN — SODIUM CHLORIDE, POTASSIUM CHLORIDE, SODIUM LACTATE AND CALCIUM CHLORIDE: 600; 310; 30; 20 INJECTION, SOLUTION INTRAVENOUS at 10:10

## 2023-11-01 RX ADMIN — HEPARIN SODIUM 5000 UNITS: 5000 INJECTION INTRAVENOUS; SUBCUTANEOUS at 08:20

## 2023-11-01 RX ADMIN — ROCURONIUM BROMIDE 20 MG: 10 INJECTION, SOLUTION INTRAVENOUS at 10:52

## 2023-11-01 RX ADMIN — Medication 2000 MG: at 10:32

## 2023-11-01 RX ADMIN — FENTANYL CITRATE 50 MCG: 0.05 INJECTION, SOLUTION INTRAMUSCULAR; INTRAVENOUS at 11:50

## 2023-11-01 RX ADMIN — GABAPENTIN 100 MG: 100 CAPSULE ORAL at 14:45

## 2023-11-01 RX ADMIN — ONDANSETRON 4 MG: 2 INJECTION INTRAMUSCULAR; INTRAVENOUS at 13:00

## 2023-11-01 RX ADMIN — Medication 100 MCG: at 10:46

## 2023-11-01 RX ADMIN — Medication 50 MCG: at 10:49

## 2023-11-01 RX ADMIN — SODIUM CHLORIDE, POTASSIUM CHLORIDE, SODIUM LACTATE AND CALCIUM CHLORIDE: 600; 310; 30; 20 INJECTION, SOLUTION INTRAVENOUS at 22:57

## 2023-11-01 RX ADMIN — PROMETHAZINE HYDROCHLORIDE 25 MG: 12.5 TABLET ORAL at 14:16

## 2023-11-01 RX ADMIN — DEXAMETHASONE SODIUM PHOSPHATE 4 MG: 10 INJECTION INTRAMUSCULAR; INTRAVENOUS at 10:35

## 2023-11-01 RX ADMIN — ROCURONIUM BROMIDE 10 MG: 10 INJECTION, SOLUTION INTRAVENOUS at 11:38

## 2023-11-01 RX ADMIN — HYDROMORPHONE HYDROCHLORIDE 0.25 MG: 1 INJECTION, SOLUTION INTRAMUSCULAR; INTRAVENOUS; SUBCUTANEOUS at 14:36

## 2023-11-01 ASSESSMENT — PAIN DESCRIPTION - DESCRIPTORS
DESCRIPTORS: SORE
DESCRIPTORS: ACHING
DESCRIPTORS: SORE
DESCRIPTORS: SORE;PRESSURE
DESCRIPTORS: PRESSURE;SHARP
DESCRIPTORS: PRESSURE

## 2023-11-01 ASSESSMENT — PAIN SCALES - GENERAL
PAINLEVEL_OUTOF10: 6
PAINLEVEL_OUTOF10: 7
PAINLEVEL_OUTOF10: 7
PAINLEVEL_OUTOF10: 8
PAINLEVEL_OUTOF10: 5
PAINLEVEL_OUTOF10: 5
PAINLEVEL_OUTOF10: 10
PAINLEVEL_OUTOF10: 6
PAINLEVEL_OUTOF10: 9
PAINLEVEL_OUTOF10: 7

## 2023-11-01 ASSESSMENT — PAIN DESCRIPTION - LOCATION
LOCATION: ABDOMEN

## 2023-11-01 ASSESSMENT — PAIN - FUNCTIONAL ASSESSMENT: PAIN_FUNCTIONAL_ASSESSMENT: 0-10

## 2023-11-01 NOTE — INTERVAL H&P NOTE
Pt Name: Celsa Deleon  MRN: [id number]  YOB: 1990  Date of evaluation: 11/1/2023    I have reviewed the patient's history and physical examination completed in pre-admission testing.     Changes to history or on examination, if any, are as follows:  none, clearance on file    Marian Martinez PA-C  11/1/23  8:09 AM

## 2023-11-01 NOTE — OP NOTE
Operative Note      Patient: Owen Wright  YOB: 1990  MRN: [id number]    Date of Procedure: 11/1/2023    Pre-Op Diagnosis Codes:     * Morbid obesity ([address]) [E66.01]     * Other specified diabetes mellitus with other specified complication, unspecified whether long term insulin use ([address]) [E13.69]     * PCOS (polycystic ovarian syndrome) [E28.2]  BMI 38    Post-Op Diagnosis: Same and HIATAL HERNIA       Procedure:  XI Robotic/Laparoscopic Sleeve Gastrectomy, EGD, Hiatal hernia repair    Surgeon(s):  Piper Julien DO    Assistant:   * No surgical staff found *    Anesthesia: General    Estimated Blood Loss (mL): Minimal    Complications: None    Specimens:   ID Type Source Tests Collected by Time Destination   A : portion of stomach  Tissue Stomach SURGICAL PATHOLOGY Piper Julien DO 11/1/2023 1027        Implants:  * No implants in log *      Drains: * No LDAs found *    Findings:   2 cm hiatal hernia  Counts reported to me as correct  Negative leak test  Hemostatic staple lines    Detailed Description of Procedure:     Operative narrative: The patient was taken to the operative suite and administered anesthesia by the anesthetic team.  Next we prepped and draped in normal sterile fashion. Incision was then made at Gasca's point for a 12 mm port. The abdomen was surveyed and we entered the abdomen using a optical Visiport trocar of 12 mm in size. This was accomplished at Gasca's point. Pneumoperitoneum was then established without complication, the underlying area surveyed. We then placed 4 other ports in standard fashion under direct laparoscopic visualization. Attention was then turned towards placement of the [facility] liver retractor. Small incision made just inferior to the xiphoid process for the liver retractor. The liver retractor was then placed under direct laparoscopic visualization in order to facilitate visualization of the stomach and hiatus.   Visible hiatal

## 2023-11-02 VITALS
BODY MASS INDEX: 38.57 KG/M2 | DIASTOLIC BLOOD PRESSURE: 77 MMHG | RESPIRATION RATE: 16 BRPM | WEIGHT: 240 LBS | HEIGHT: 66 IN | HEART RATE: 58 BPM | SYSTOLIC BLOOD PRESSURE: 125 MMHG | OXYGEN SATURATION: 100 % | TEMPERATURE: 98.3 F

## 2023-11-02 LAB
ANION GAP SERPL CALCULATED.3IONS-SCNC: 12 MMOL/L (ref 9–17)
BUN SERPL-MCNC: 8 MG/DL (ref 6–20)
CALCIUM SERPL-MCNC: 8.5 MG/DL (ref 8.6–10.4)
CHLORIDE SERPL-SCNC: 104 MMOL/L (ref 98–107)
CO2 SERPL-SCNC: 22 MMOL/L (ref 20–31)
CREAT SERPL-MCNC: 0.6 MG/DL (ref 0.5–0.9)
ERYTHROCYTE [DISTWIDTH] IN BLOOD BY AUTOMATED COUNT: 12.8 % (ref 11.8–14.4)
GFR SERPL CREATININE-BSD FRML MDRD: >60 ML/MIN/1.73M2
GLUCOSE SERPL-MCNC: 128 MG/DL (ref 70–99)
HCT VFR BLD AUTO: 42.1 % (ref 36.3–47.1)
HGB BLD-MCNC: 13.7 G/DL (ref 11.9–15.1)
MCH RBC QN AUTO: 30.7 PG (ref 25.2–33.5)
MCHC RBC AUTO-ENTMCNC: 32.5 G/DL (ref 28.4–34.8)
MCV RBC AUTO: 94.4 FL (ref 82.6–102.9)
NRBC BLD-RTO: 0 PER 100 WBC
PLATELET # BLD AUTO: 247 K/UL (ref 138–453)
PMV BLD AUTO: 9.7 FL (ref 8.1–13.5)
POTASSIUM SERPL-SCNC: 3.9 MMOL/L (ref 3.7–5.3)
RBC # BLD AUTO: 4.46 M/UL (ref 3.95–5.11)
SODIUM SERPL-SCNC: 138 MMOL/L (ref 135–144)
SURGICAL PATHOLOGY REPORT: NORMAL
WBC OTHER # BLD: 10.2 K/UL (ref 3.5–11.3)

## 2023-11-02 PROCEDURE — 94640 AIRWAY INHALATION TREATMENT: CPT

## 2023-11-02 PROCEDURE — 6360000002 HC RX W HCPCS: Performed by: SURGERY

## 2023-11-02 PROCEDURE — 2580000003 HC RX 258: Performed by: SURGERY

## 2023-11-02 PROCEDURE — 36415 COLL VENOUS BLD VENIPUNCTURE: CPT

## 2023-11-02 PROCEDURE — 80048 BASIC METABOLIC PNL TOTAL CA: CPT

## 2023-11-02 PROCEDURE — 6370000000 HC RX 637 (ALT 250 FOR IP): Performed by: SURGERY

## 2023-11-02 PROCEDURE — 85027 COMPLETE CBC AUTOMATED: CPT

## 2023-11-02 RX ORDER — SODIUM CHLORIDE 0.9 % (FLUSH) 0.9 %
5-40 SYRINGE (ML) INJECTION PRN
Status: DISCONTINUED | OUTPATIENT
Start: 2023-11-02 | End: 2023-11-02 | Stop reason: HOSPADM

## 2023-11-02 RX ORDER — OXYCODONE HYDROCHLORIDE AND ACETAMINOPHEN 5; 325 MG/1; MG/1
1 TABLET ORAL EVERY 6 HOURS PRN
Qty: 28 TABLET | Refills: 0 | Status: SHIPPED | OUTPATIENT
Start: 2023-11-02 | End: 2023-11-09

## 2023-11-02 RX ORDER — ENOXAPARIN SODIUM 100 MG/ML
40 INJECTION SUBCUTANEOUS 2 TIMES DAILY
Qty: 11.2 ML | Refills: 0 | Status: SHIPPED | OUTPATIENT
Start: 2023-11-02 | End: 2023-11-16

## 2023-11-02 RX ORDER — ONDANSETRON 2 MG/ML
4 INJECTION INTRAMUSCULAR; INTRAVENOUS EVERY 6 HOURS PRN
Status: DISCONTINUED | OUTPATIENT
Start: 2023-11-02 | End: 2023-11-02 | Stop reason: HOSPADM

## 2023-11-02 RX ORDER — ONDANSETRON 4 MG/1
4 TABLET, ORALLY DISINTEGRATING ORAL EVERY 8 HOURS PRN
Status: DISCONTINUED | OUTPATIENT
Start: 2023-11-02 | End: 2023-11-02 | Stop reason: HOSPADM

## 2023-11-02 RX ORDER — CYCLOBENZAPRINE HCL 10 MG
10 TABLET ORAL 3 TIMES DAILY PRN
Qty: 30 TABLET | Refills: 0 | Status: SHIPPED | OUTPATIENT
Start: 2023-11-02 | End: 2023-11-12

## 2023-11-02 RX ORDER — SODIUM CHLORIDE 0.9 % (FLUSH) 0.9 %
5-40 SYRINGE (ML) INJECTION EVERY 12 HOURS SCHEDULED
Status: DISCONTINUED | OUTPATIENT
Start: 2023-11-02 | End: 2023-11-02 | Stop reason: HOSPADM

## 2023-11-02 RX ORDER — SODIUM CHLORIDE 9 MG/ML
INJECTION, SOLUTION INTRAVENOUS PRN
Status: DISCONTINUED | OUTPATIENT
Start: 2023-11-02 | End: 2023-11-02 | Stop reason: HOSPADM

## 2023-11-02 RX ORDER — PANTOPRAZOLE SODIUM 40 MG/1
40 TABLET, DELAYED RELEASE ORAL
Status: DISCONTINUED | OUTPATIENT
Start: 2023-11-02 | End: 2023-11-02 | Stop reason: HOSPADM

## 2023-11-02 RX ORDER — PROMETHAZINE HYDROCHLORIDE 25 MG/1
25 TABLET ORAL EVERY 6 HOURS PRN
Qty: 28 TABLET | Refills: 0 | Status: SHIPPED | OUTPATIENT
Start: 2023-11-02 | End: 2023-11-09

## 2023-11-02 RX ADMIN — GABAPENTIN 100 MG: 100 CAPSULE ORAL at 09:27

## 2023-11-02 RX ADMIN — OXYCODONE AND ACETAMINOPHEN 2 TABLET: 5; 325 TABLET ORAL at 06:38

## 2023-11-02 RX ADMIN — PANTOPRAZOLE SODIUM 40 MG: 40 TABLET, DELAYED RELEASE ORAL at 10:26

## 2023-11-02 RX ADMIN — SODIUM CHLORIDE, PRESERVATIVE FREE 10 ML: 5 INJECTION INTRAVENOUS at 09:27

## 2023-11-02 RX ADMIN — GABAPENTIN 100 MG: 100 CAPSULE ORAL at 14:21

## 2023-11-02 RX ADMIN — HEPARIN SODIUM 5000 UNITS: 5000 INJECTION INTRAVENOUS; SUBCUTANEOUS at 14:20

## 2023-11-02 RX ADMIN — GABAPENTIN 100 MG: 100 CAPSULE ORAL at 00:01

## 2023-11-02 RX ADMIN — IPRATROPIUM BROMIDE AND ALBUTEROL SULFATE 1 DOSE: 2.5; .5 SOLUTION RESPIRATORY (INHALATION) at 14:50

## 2023-11-02 RX ADMIN — HEPARIN SODIUM 5000 UNITS: 5000 INJECTION INTRAVENOUS; SUBCUTANEOUS at 06:15

## 2023-11-02 RX ADMIN — OXYCODONE AND ACETAMINOPHEN 2 TABLET: 5; 325 TABLET ORAL at 14:25

## 2023-11-02 RX ADMIN — Medication 2000 MG: at 06:15

## 2023-11-02 ASSESSMENT — PAIN SCALES - GENERAL
PAINLEVEL_OUTOF10: 7
PAINLEVEL_OUTOF10: 7

## 2023-11-02 ASSESSMENT — PAIN DESCRIPTION - ORIENTATION: ORIENTATION: LEFT

## 2023-11-02 ASSESSMENT — PAIN DESCRIPTION - DESCRIPTORS: DESCRIPTORS: ACHING;SORE

## 2023-11-02 ASSESSMENT — PAIN DESCRIPTION - LOCATION: LOCATION: ABDOMEN

## 2023-11-02 NOTE — PLAN OF CARE
Problem: Discharge Planning  Goal: Discharge to home or other facility with appropriate resources  11/2/2023 1511 by Jeffy Hall RN  Outcome: Progressing  11/2/2023 0624 by Dayami Weaver, RN  Outcome: Progressing  11/2/2023 0337 by Dayami Weaver, RN  Outcome: Progressing     Problem: Chronic Conditions and Co-morbidities  Goal: Patient's chronic conditions and co-morbidity symptoms are monitored and maintained or improved  11/2/2023 1511 by Jeffy Hall RN  Outcome: Progressing  11/2/2023 0624 by Dayami Weaver, RN  Outcome: Progressing  11/2/2023 0337 by Dayami Weaver, RN  Outcome: Progressing     Problem: Pain  Goal: Verbalizes/displays adequate comfort level or baseline comfort level  11/2/2023 1511 by Jeffy Hall RN  Outcome: Progressing  11/2/2023 0624 by Dayami Weaver, RN  Outcome: Progressing  11/2/2023 0337 by Dayami Weaver, RN  Outcome: Progressing     Problem: Safety - Adult  Goal: Free from fall injury  11/2/2023 1511 by Jeffy Hall, RN  Outcome: Progressing  11/2/2023 0624 by Dayami Weaver, RN  Outcome: Progressing  11/2/2023 0337 by Dayami Weaver, RN  Outcome: Progressing     Problem: ABCDS Injury Assessment  Goal: Absence of physical injury  11/2/2023 1511 by Jeffy Hall, RN  Outcome: Progressing  11/2/2023 0624 by Dayami Weaver, RN  Outcome: Progressing  11/2/2023 0337 by Dayami Weaver, RN  Outcome: Progressing

## 2023-11-02 NOTE — DISCHARGE SUMMARY
BARIATRIC SURGERY DISCHARGE SUMMARY    Disposition: DISCHARGE TO HOME    PATIENT NAME: Hansa Feldman    YOB: 1990  MEDICAL RECORD NO. [id number]  DATE: 11/2/2023  ADMITTING PHYSICIAN: DR. Sergio Deluna  PRIMARY CARE PHYSICIAN: Brenda Haney DO  ADMIT DATE: 11/1/2023  DISCHARGE DATE:  11/2/2023  DISPOSITION: to HOME  ADMITTING DIAGNOSIS:   1. Acute post-operative pain    2. Morbid obesity ([address])    3. Other specified diabetes mellitus with other specified complication, unspecified whether long term insulin use ([address])    4. PCOS (polycystic ovarian syndrome)    5. S/P laparoscopic sleeve gastrectomy      DISCHARGE DIAGNOSIS:   Patient Active Problem List   Diagnosis Code    Headache R51.9    HGSIL (high grade squamous intraepithelial lesion) on Pap smear of cervix R87.613    Bradycardia  likely vagal related R00.1    Bipolar disorder ([address]) F31.9    Diabetes mellitus type 2 in obese (HCC) E11.69, E66.9    Anxiety and depression F41.9, F32. A    Bipolar disease, chronic (HCC) F31.9    Obesity, Class III, BMI 40-49.9 (morbid obesity) (HCC) E66.01    PCOS (polycystic ovarian syndrome) E28.2    History of loop electrosurgical excision procedure (LEEP) of cervix Z98.890    Fatty liver K76.0    S/P laparoscopic sleeve gastrectomy Z98.84     CONSULTANTS:  none    PROCEDURES / DIAGNOSTIC TESTS:   1. Robotic assisted laparoscopic Laparoscopic Sleeve Gastrectomy (46973) with EGD    Rex Faye is a 35 yo female w/ longstanding h/o obesity who underwent robotic assisted laparoscopic Laparoscopic Sleeve Gastrectomy (37550) with EGD  on 11/1/2023. Pt was admitted post-operatively for further evaluation and mgmt. Labs and images were followed daily throughout admission. Post-operatively pt's pain was controlled w/ IV pain medications and transitioned to PO as tolerated. Bariatric clear liquids were started postoperatively and continued through discharge. Pt was ambulatory w/o assistance.      At time

## 2023-11-02 NOTE — DISCHARGE INSTRUCTIONS
and had to stop, ask your doctor when you can resume taking them. Be sure to review your medications with your primary care provider at your follow up office visit. Lifestyle Changes    Changing your diet and level of activity are the biggest lifestyle changes associated with your success. Be aware that you may have emotional ups and downs after this surgery. Follow-up   Follow up with your primary care physician in the next couple of weeks  Follow up with Dr. Dalia Cardoso in 1 week. If you don't already have a scheduled  appointment, please call the office at [phone]. Call Your Doctor If Any of the Following Occurs   Monitor your recovery once you leave the hospital. If any of the following occur, call your doctor:   Signs of infection, including fever above 100F    Redness, swelling, increasing pain, excessive bleeding, or any discharge from the incision site   Persistent nausea and/or vomiting   Pain that you can't control with the medications you've been given   Shortness of breath and/or chest pain   Tachycardia (racing heart sensation) unrelieved by rest  Pain, redness and/or swelling in your feet or legs    Sudden onset of severe left shoulder pain or severe abdominal pain  Any symptoms that are causing you concern   In case of an emergency, call 911 immediately.

## 2023-11-02 NOTE — PROGRESS NOTES
Pt given discharge instructions with all questions answered at this time. Pt discharged with family and all belongings.

## 2023-11-02 NOTE — PROGRESS NOTES
CLINICAL PHARMACY NOTE: MEDS TO BEDS    Total # of Prescriptions Filled: 4   The following medications were delivered to the patient:  Cyclobenzaprine  Enoxaparin  Promethazine  percocet    Additional Documentation:   No copay - writer delivered to bedside

## 2023-11-02 NOTE — PLAN OF CARE
Problem: Discharge Planning  Goal: Discharge to home or other facility with appropriate resources  11/2/2023 0624 by Zhanna Douglas RN  Outcome: Progressing  11/2/2023 0337 by Zhanna Douglas RN  Outcome: Progressing     Problem: Chronic Conditions and Co-morbidities  Goal: Patient's chronic conditions and co-morbidity symptoms are monitored and maintained or improved  11/2/2023 0624 by Zhanna Douglas RN  Outcome: Progressing  11/2/2023 0337 by Zhanna Douglas RN  Outcome: Progressing     Problem: Pain  Goal: Verbalizes/displays adequate comfort level or baseline comfort level  11/2/2023 0624 by Zhanna Douglas RN  Outcome: Progressing  11/2/2023 0337 by Zhanna Douglas RN  Outcome: Progressing     Problem: Safety - Adult  Goal: Free from fall injury  11/2/2023 0624 by Zhanna Douglas RN  Outcome: Progressing  11/2/2023 0337 by Zhanna Douglas RN  Outcome: Progressing     Problem: ABCDS Injury Assessment  Goal: Absence of physical injury  11/2/2023 0624 by Zhanna Douglas RN  Outcome: Progressing  11/2/2023 0337 by Zhanna Douglas RN  Outcome: Progressing

## 2023-11-02 NOTE — CARE COORDINATION
Case Management Assessment  Initial Evaluation    Date/Time of Evaluation: 11/2/2023 12:58 PM  Assessment Completed by: Baldemar Fontenot    If patient is discharged prior to next notation, then this note serves as note for discharge by case management. Patient Name: Charity Medrano                   YOB: 1990  Diagnosis: Morbid obesity ([address]) [E66.01]  Other specified diabetes mellitus with other specified complication, unspecified whether long term insulin use ([address]) [E13.69]  PCOS (polycystic ovarian syndrome) [E28.2]  S/P laparoscopic sleeve gastrectomy [Z98.84]                   Date / Time: 11/1/2023  7:06 AM    Patient Admission Status: Inpatient   Readmission Risk (Low < 19, Mod (19-27), High > 27): Readmission Risk Score: 4.7    Current PCP: Jak Velez, DO  PCP verified by CM? (P) Yes    Chart Reviewed: Yes      History Provided by: (P) Patient  Patient Orientation: (P) Alert and Oriented, Person, Place, Situation, Self    Patient Cognition: (P) Alert    Hospitalization in the last 30 days (Readmission):  Yes    If yes, Readmission Assessment in  Navigator will be completed.     Advance Directives:      Code Status: Full Code   Patient's Primary Decision Maker is: (P) Legal Next of Kin      Discharge Planning:    Patient lives with: (P) Spouse/Significant Other, Children Type of Home: (P) House  Primary Care Giver: (P) Self  Patient Support Systems include: (P) Spouse/Significant Other, Children   Current Financial resources: (P) Medicaid  Current community resources:    Current services prior to admission: (P) None            Current DME:              Type of Home Care services:  (P) None    ADLS  Prior functional level: (P) Independent in ADLs/IADLs  Current functional level: (P) Assistance with the following:, Housework, Shopping, Cooking, Mobility    PT AM-PAC:   /24  OT AM-PAC:   /24    Family can provide assistance at DC: (P) Yes  Would you like Case Management to discuss the discharge

## 2023-11-03 ENCOUNTER — TELEPHONE (OUTPATIENT)
Dept: BARIATRICS/WEIGHT MGMT | Age: 33
End: 2023-11-03

## 2023-11-06 NOTE — TELEPHONE ENCOUNTER
Post-op outreach call made to pt. Pt reports frequent ambulation around home. Pt wearing abd binder. No complaints of SOB or tachycardia. Laparoscopic incisional sites without problems. Pt has  had a BM since surgery. Passing flatus. Agrees to use Milk of Magnesia or Miriaax and monitor for BM. Pt reports urine output of at least 4 times in a 24 hour period. Intake is described as 48 oz, tolerating protein shake. Rates pain as 6 on a 0-10 scale. Pt using pain medication as directed. Allowed pt the opportunity to ask questions. Reminded patient to reference the patient education materials as needed. Reminded pt that they may phone the office or the \"after hours telephone number\"  that is listed in the patient education binder as needed. Pt verbalized understanding. Pt without concerns at this time    Post op office appt date and time reviewed with pt.     Patient  is IDDM, will check sugar    Has Lovenox is going well

## 2023-11-09 ENCOUNTER — OFFICE VISIT (OUTPATIENT)
Dept: BARIATRICS/WEIGHT MGMT | Age: 33
End: 2023-11-09

## 2023-11-09 VITALS
DIASTOLIC BLOOD PRESSURE: 60 MMHG | HEART RATE: 96 BPM | OXYGEN SATURATION: 97 % | HEIGHT: 66 IN | TEMPERATURE: 98.3 F | SYSTOLIC BLOOD PRESSURE: 118 MMHG | BODY MASS INDEX: 36.8 KG/M2 | WEIGHT: 229 LBS

## 2023-11-09 DIAGNOSIS — Z98.84 STATUS POST LAPAROSCOPIC SLEEVE GASTRECTOMY: Primary | ICD-10-CM

## 2023-11-09 PROCEDURE — 99024 POSTOP FOLLOW-UP VISIT: CPT | Performed by: SURGERY

## 2023-11-09 RX ORDER — PANTOPRAZOLE SODIUM 20 MG/1
20 TABLET, DELAYED RELEASE ORAL DAILY
Qty: 30 TABLET | Refills: 3 | Status: SHIPPED | OUTPATIENT
Start: 2023-11-09

## 2023-11-09 RX ORDER — CYCLOBENZAPRINE HCL 10 MG
10 TABLET ORAL 3 TIMES DAILY PRN
Qty: 21 TABLET | Refills: 0 | Status: SHIPPED | OUTPATIENT
Start: 2023-11-09 | End: 2023-11-19

## 2023-11-09 NOTE — TELEPHONE ENCOUNTER
Set up for iv fluids per Dr Shawn Stringer request at the [facility] 11-10-23 and needs to arrive by 7:30 am and notified patient

## 2023-11-10 ENCOUNTER — HOSPITAL ENCOUNTER (OUTPATIENT)
Dept: INFUSION THERAPY | Age: 33
Discharge: HOME | End: 2023-11-10
Payer: COMMERCIAL

## 2023-11-10 VITALS
WEIGHT: 220 LBS | RESPIRATION RATE: 16 BRPM | TEMPERATURE: 98.2 F | HEART RATE: 81 BPM | DIASTOLIC BLOOD PRESSURE: 72 MMHG | SYSTOLIC BLOOD PRESSURE: 113 MMHG | BODY MASS INDEX: 35.36 KG/M2 | HEIGHT: 66 IN

## 2023-11-10 PROCEDURE — 2580000003 HC RX 258: Performed by: SURGERY

## 2023-11-10 PROCEDURE — 96361 HYDRATE IV INFUSION ADD-ON: CPT

## 2023-11-10 PROCEDURE — 96360 HYDRATION IV INFUSION INIT: CPT

## 2023-11-10 RX ORDER — SODIUM CHLORIDE 9 MG/ML
10 INJECTION INTRAVENOUS PRN
Status: DISCONTINUED | OUTPATIENT
Start: 2023-11-10 | End: 2023-11-11 | Stop reason: HOSPADM

## 2023-11-10 RX ORDER — SODIUM CHLORIDE 9 MG/ML
INJECTION, SOLUTION INTRAVENOUS CONTINUOUS
Status: ACTIVE | OUTPATIENT
Start: 2023-11-10 | End: 2023-11-10

## 2023-11-10 RX ORDER — 0.9 % SODIUM CHLORIDE 0.9 %
1000 INTRAVENOUS SOLUTION INTRAVENOUS ONCE
Status: COMPLETED | OUTPATIENT
Start: 2023-11-10 | End: 2023-11-10

## 2023-11-10 RX ADMIN — SODIUM CHLORIDE: 9 INJECTION, SOLUTION INTRAVENOUS at 08:48

## 2023-11-10 RX ADMIN — SODIUM CHLORIDE, PRESERVATIVE FREE 10 ML: 5 INJECTION INTRAVENOUS at 07:45

## 2023-11-10 RX ADMIN — SODIUM CHLORIDE 1000 ML: 9 INJECTION, SOLUTION INTRAVENOUS at 07:45

## 2023-11-10 NOTE — DISCHARGE INSTRUCTIONS
Outpatient  Discharge Instructions for IV Therapy       [address] [place] [address]  [phone]        You are advised to carry out the following instructions:      Diet:  As prescribed by your physician. Activity:  As prescribed by your physician. Saline Lock:   Notify your Physician if your previous IV site becomes,red,sore,swollen,painful, have drainage,or you develop a fever. Other:  If you develop hives,rash,itching or trouble breathing, go to the nearest Emergency Room. These could be signs of a allergic reaction to the medication. Keep the IV site covered with a bandage. Keep it clean and dry until healed. Follow up appointment:            ANY PROBLEMS OR CONCERNS NOTIFY YOUR PHYSICIAN  OR   GO TO THE NEAREST EMERGENCY ROOM.

## 2023-11-14 ENCOUNTER — TELEPHONE (OUTPATIENT)
Dept: BARIATRICS/WEIGHT MGMT | Age: 33
End: 2023-11-14

## 2023-11-14 ENCOUNTER — PATIENT MESSAGE (OUTPATIENT)
Dept: BARIATRICS/WEIGHT MGMT | Age: 33
End: 2023-11-14

## 2023-11-14 NOTE — TELEPHONE ENCOUNTER
Called patient, pain with moving after she vomited the vitamin. This is MSK pain. The pain started after retching.       Nausea improving    No fevers/chills  No CP/SOB    Had stools    Call if worsening    She will take Tylenol and flexeril    She will switch to flintstones vitamins

## 2023-11-14 NOTE — TELEPHONE ENCOUNTER
From: Celsa Deleon  To: Dr. Nell Hill: 11/14/2023 8:15 AM EST  Subject: Vomiting    I tried to call the office and the lines aren't open. I have started vomiting seconds after taking my chewable multivitamin with iron that I purchased from the office. I was doing ok the first few days no issues and starting yesterday morning and night as soon as it hits my stomach I'm instantly vomiting it back up. I also now have a pain that is radiating the back of my left rib, it almost feels like a balled muscle but it's just not going away either.

## 2023-11-14 NOTE — TELEPHONE ENCOUNTER
Next Visit Date:  12/6/2023    Patient Surgery Date  11/01/2023    Type of  Surgery  Sleeve    Patient calls complaining of  nausea and vomiting when she takes multivitamin. The first couple of days after she started taking it she was fine. Starting yesterday had nausea and vomiting when taking it. She is taking the celebrate vitamin we sell here. She did get a multivitamin OTC but has not tried it yet due to feeling sick. She has been focusing on getting water in, she got in 50 oz yesterday. Also having left side pain in her ribs, is constant and goes into her back. Onset: 1 day(s) ago  Timing: constant, intermittent  Severity: Moderate    Progression: stable    Associated Symptoms: Spasms and Flank Pain    Any allergy  To medications     NKDA    Current  Pharmacy   [facility]    Patient advised:   If  Symptoms worsen seek treatment at  Emergency Room. You will receive a call back from the office within 24-48 hours.     Is it ok to leave a message if we call back yes

## 2023-11-21 NOTE — TELEPHONE ENCOUNTER
Spoke to patient today she said she spoke to you last week and you were going to do a note to extend her time off with a return to work date of Tomorrow. Ok to write letter?

## 2023-12-06 ENCOUNTER — OFFICE VISIT (OUTPATIENT)
Dept: BARIATRICS/WEIGHT MGMT | Age: 33
End: 2023-12-06

## 2023-12-06 VITALS
BODY MASS INDEX: 35.2 KG/M2 | HEIGHT: 66 IN | OXYGEN SATURATION: 97 % | DIASTOLIC BLOOD PRESSURE: 70 MMHG | SYSTOLIC BLOOD PRESSURE: 112 MMHG | WEIGHT: 219 LBS | HEART RATE: 70 BPM

## 2023-12-06 DIAGNOSIS — E28.2 PCOS (POLYCYSTIC OVARIAN SYNDROME): ICD-10-CM

## 2023-12-06 DIAGNOSIS — F32.A ANXIETY AND DEPRESSION: ICD-10-CM

## 2023-12-06 DIAGNOSIS — E66.9 OBESITY (BMI 30-39.9): ICD-10-CM

## 2023-12-06 DIAGNOSIS — K76.0 FATTY LIVER: ICD-10-CM

## 2023-12-06 DIAGNOSIS — E66.9 DIABETES MELLITUS TYPE 2 IN OBESE (HCC): Primary | ICD-10-CM

## 2023-12-06 DIAGNOSIS — Z98.84 S/P LAPAROSCOPIC SLEEVE GASTRECTOMY: ICD-10-CM

## 2023-12-06 DIAGNOSIS — F31.9 BIPOLAR DISEASE, CHRONIC (HCC): ICD-10-CM

## 2023-12-06 DIAGNOSIS — F41.9 ANXIETY AND DEPRESSION: ICD-10-CM

## 2023-12-06 DIAGNOSIS — E11.69 DIABETES MELLITUS TYPE 2 IN OBESE (HCC): Primary | ICD-10-CM

## 2023-12-06 PROCEDURE — 99024 POSTOP FOLLOW-UP VISIT: CPT | Performed by: NURSE PRACTITIONER

## 2024-02-05 ENCOUNTER — HOSPITAL ENCOUNTER (OUTPATIENT)
Age: 34
Discharge: HOME | End: 2024-02-05
Payer: COMMERCIAL

## 2024-02-05 DIAGNOSIS — E11.69 DIABETES MELLITUS TYPE 2 IN OBESE (HCC): ICD-10-CM

## 2024-02-05 DIAGNOSIS — F41.9 ANXIETY AND DEPRESSION: ICD-10-CM

## 2024-02-05 DIAGNOSIS — F31.9 BIPOLAR DISEASE, CHRONIC (HCC): ICD-10-CM

## 2024-02-05 DIAGNOSIS — E66.9 OBESITY (BMI 30-39.9): ICD-10-CM

## 2024-02-05 DIAGNOSIS — E28.2 PCOS (POLYCYSTIC OVARIAN SYNDROME): ICD-10-CM

## 2024-02-05 DIAGNOSIS — E66.9 DIABETES MELLITUS TYPE 2 IN OBESE (HCC): ICD-10-CM

## 2024-02-05 DIAGNOSIS — F32.A ANXIETY AND DEPRESSION: ICD-10-CM

## 2024-02-05 DIAGNOSIS — K76.0 FATTY LIVER: ICD-10-CM

## 2024-02-05 DIAGNOSIS — Z98.84 S/P LAPAROSCOPIC SLEEVE GASTRECTOMY: ICD-10-CM

## 2024-02-05 LAB
25(OH)D3 SERPL-MCNC: 38.7 NG/ML
ALBUMIN SERPL-MCNC: 4.4 G/DL (ref 3.5–5.2)
ALBUMIN/GLOB SERPL: 1.6 {RATIO} (ref 1–2.5)
ALP SERPL-CCNC: 43 U/L (ref 35–104)
ALT SERPL-CCNC: 19 U/L (ref 5–33)
ANION GAP SERPL CALCULATED.3IONS-SCNC: 10 MMOL/L (ref 9–17)
AST SERPL-CCNC: 16 U/L
BASOPHILS # BLD: 0.03 K/UL (ref 0–0.2)
BASOPHILS NFR BLD: 1 % (ref 0–2)
BILIRUB SERPL-MCNC: 0.3 MG/DL (ref 0.3–1.2)
BUN SERPL-MCNC: 17 MG/DL (ref 6–20)
BUN/CREAT SERPL: 28 (ref 9–20)
CALCIUM SERPL-MCNC: 8.9 MG/DL (ref 8.6–10.4)
CHLORIDE SERPL-SCNC: 106 MMOL/L (ref 98–107)
CHOLEST SERPL-MCNC: 156 MG/DL
CHOLESTEROL/HDL RATIO: 4.5
CO2 SERPL-SCNC: 27 MMOL/L (ref 20–31)
CREAT SERPL-MCNC: 0.6 MG/DL (ref 0.5–0.9)
EOSINOPHIL # BLD: 0.07 K/UL (ref 0–0.44)
EOSINOPHILS RELATIVE PERCENT: 1 % (ref 1–4)
ERYTHROCYTE [DISTWIDTH] IN BLOOD BY AUTOMATED COUNT: 13.1 % (ref 11.8–14.4)
FERRITIN SERPL-MCNC: 236 NG/ML (ref 13–150)
FOLATE SERPL-MCNC: >20 NG/ML
GFR SERPL CREATININE-BSD FRML MDRD: >60 ML/MIN/1.73M2
GLUCOSE SERPL-MCNC: 95 MG/DL (ref 70–99)
HCT VFR BLD AUTO: 43 % (ref 36.3–47.1)
HDLC SERPL-MCNC: 35 MG/DL
HGB BLD-MCNC: 14.2 G/DL (ref 11.9–15.1)
IMM GRANULOCYTES # BLD AUTO: <0.03 K/UL (ref 0–0.3)
IMM GRANULOCYTES NFR BLD: 0 %
IRON SATN MFR SERPL: 28 % (ref 20–55)
IRON SERPL-MCNC: 76 UG/DL (ref 37–145)
LDLC SERPL CALC-MCNC: 95 MG/DL (ref 0–130)
LYMPHOCYTES NFR BLD: 2.27 K/UL (ref 1.1–3.7)
LYMPHOCYTES RELATIVE PERCENT: 45 % (ref 24–43)
MAGNESIUM SERPL-MCNC: 2.1 MG/DL (ref 1.6–2.6)
MCH RBC QN AUTO: 30.5 PG (ref 25.2–33.5)
MCHC RBC AUTO-ENTMCNC: 33 G/DL (ref 28.4–34.8)
MCV RBC AUTO: 92.3 FL (ref 82.6–102.9)
MONOCYTES NFR BLD: 0.42 K/UL (ref 0.1–1.2)
MONOCYTES NFR BLD: 8 % (ref 3–12)
NEUTROPHILS NFR BLD: 45 % (ref 36–65)
NEUTS SEG NFR BLD: 2.24 K/UL (ref 1.5–8.1)
NRBC BLD-RTO: 0 PER 100 WBC
PLATELET # BLD AUTO: 240 K/UL (ref 138–453)
PMV BLD AUTO: 10 FL (ref 8.1–13.5)
POTASSIUM SERPL-SCNC: 3.8 MMOL/L (ref 3.7–5.3)
PROT SERPL-MCNC: 7.1 G/DL (ref 6.4–8.3)
PTH-INTACT SERPL-MCNC: 38.7 PG/ML (ref 14–72)
RBC # BLD AUTO: 4.66 M/UL (ref 3.95–5.11)
SODIUM SERPL-SCNC: 143 MMOL/L (ref 135–144)
TIBC SERPL-MCNC: 268 UG/DL (ref 250–450)
TRIGL SERPL-MCNC: 132 MG/DL
TSH SERPL DL<=0.05 MIU/L-ACNC: 0.83 UIU/ML (ref 0.3–5)
UNSATURATED IRON BINDING CAPACITY: 192 UG/DL (ref 112–347)
VIT B12 SERPL-MCNC: 917 PG/ML (ref 232–1245)
WBC OTHER # BLD: 5 K/UL (ref 3.5–11.3)

## 2024-02-05 PROCEDURE — 80061 LIPID PANEL: CPT

## 2024-02-05 PROCEDURE — 84425 ASSAY OF VITAMIN B-1: CPT

## 2024-02-05 PROCEDURE — 84590 ASSAY OF VITAMIN A: CPT

## 2024-02-05 PROCEDURE — 82607 VITAMIN B-12: CPT

## 2024-02-05 PROCEDURE — 80053 COMPREHEN METABOLIC PANEL: CPT

## 2024-02-05 PROCEDURE — 82306 VITAMIN D 25 HYDROXY: CPT

## 2024-02-05 PROCEDURE — 83550 IRON BINDING TEST: CPT

## 2024-02-05 PROCEDURE — 84630 ASSAY OF ZINC: CPT

## 2024-02-05 PROCEDURE — 83735 ASSAY OF MAGNESIUM: CPT

## 2024-02-05 PROCEDURE — 82746 ASSAY OF FOLIC ACID SERUM: CPT

## 2024-02-05 PROCEDURE — 83970 ASSAY OF PARATHORMONE: CPT

## 2024-02-05 PROCEDURE — 83540 ASSAY OF IRON: CPT

## 2024-02-05 PROCEDURE — 85025 COMPLETE CBC W/AUTO DIFF WBC: CPT

## 2024-02-05 PROCEDURE — 84443 ASSAY THYROID STIM HORMONE: CPT

## 2024-02-05 PROCEDURE — 36415 COLL VENOUS BLD VENIPUNCTURE: CPT

## 2024-02-05 PROCEDURE — 82728 ASSAY OF FERRITIN: CPT

## 2024-02-07 LAB — ZINC SERPL-MCNC: 107.2 UG/DL (ref 60–120)

## 2024-02-08 LAB
RETINYL PALMITATE: 0.03 MG/L (ref 0–0.1)
VITAMIN A LEVEL: 0.5 MG/L (ref 0.3–1.2)
VITAMIN A, INTERP: NORMAL

## 2024-02-12 ENCOUNTER — OFFICE VISIT (OUTPATIENT)
Dept: BARIATRICS/WEIGHT MGMT | Age: 34
End: 2024-02-12
Payer: COMMERCIAL

## 2024-02-12 VITALS
OXYGEN SATURATION: 95 % | HEART RATE: 50 BPM | DIASTOLIC BLOOD PRESSURE: 60 MMHG | SYSTOLIC BLOOD PRESSURE: 100 MMHG | WEIGHT: 195 LBS | HEIGHT: 66 IN | BODY MASS INDEX: 31.34 KG/M2

## 2024-02-12 DIAGNOSIS — R55 NEUROCARDIOGENIC SYNCOPE: ICD-10-CM

## 2024-02-12 DIAGNOSIS — F32.A ANXIETY AND DEPRESSION: ICD-10-CM

## 2024-02-12 DIAGNOSIS — Z98.84 STATUS POST LAPAROSCOPIC SLEEVE GASTRECTOMY: Primary | ICD-10-CM

## 2024-02-12 DIAGNOSIS — E28.2 PCOS (POLYCYSTIC OVARIAN SYNDROME): ICD-10-CM

## 2024-02-12 DIAGNOSIS — F41.9 ANXIETY AND DEPRESSION: ICD-10-CM

## 2024-02-12 DIAGNOSIS — F31.9 BIPOLAR DISEASE, CHRONIC (HCC): ICD-10-CM

## 2024-02-12 DIAGNOSIS — K21.9 GERD WITHOUT ESOPHAGITIS: ICD-10-CM

## 2024-02-12 DIAGNOSIS — K76.0 FATTY LIVER: ICD-10-CM

## 2024-02-12 PROCEDURE — G8417 CALC BMI ABV UP PARAM F/U: HCPCS | Performed by: NURSE PRACTITIONER

## 2024-02-12 PROCEDURE — G8427 DOCREV CUR MEDS BY ELIG CLIN: HCPCS | Performed by: NURSE PRACTITIONER

## 2024-02-12 PROCEDURE — 1036F TOBACCO NON-USER: CPT | Performed by: NURSE PRACTITIONER

## 2024-02-12 PROCEDURE — G8484 FLU IMMUNIZE NO ADMIN: HCPCS | Performed by: NURSE PRACTITIONER

## 2024-02-12 PROCEDURE — 99214 OFFICE O/P EST MOD 30 MIN: CPT | Performed by: NURSE PRACTITIONER

## 2024-02-12 RX ORDER — LIDOCAINE 50 MG/G
OINTMENT TOPICAL
Qty: 50 G | Refills: 0 | Status: SHIPPED | OUTPATIENT
Start: 2024-02-12

## 2024-02-12 NOTE — PROGRESS NOTES
Medical Nutrition Therapy for Metabolic and Bariatric Surgery  3 Month Post-Op Gastric Sleeve Nutrition Follow-Up      Nutrition Assessment:  Patient reports certain foods cause her acid reflux, has been limiting them.  tolerating diet, sometimes eating small, frequent meals.  always eating protein first,  always eating protein portions with all meals and snacks.  Drinking at least 64 oz of fluid and sugar free beverages daily. Patient typically drinking water. Has a protein shake in the morning, premier or fairlife.  consistently taking vitamins and minerals.   walking to remain active. Reports knows needs to make this more of a priority.   Goals discussed to work on until next visit: consistency with small, frequent meals. Encouraged addition of Vit D and B-complex due to OTC MVI.    All questions answered. Patient aware of how to contact RD if needs arise. RD to remain available.    24-hr diet recall scanned into chart.    Multivitamin/Mineral Intake: Yes, [place] prenatal MVI.  Did not tolerate bariatric MVI well per her report.    Calcium Intake: Yes, takes 2 tums per day.    Vitals:   Vitals:    02/12/24 1410   BP: 100/60   Pulse: 50   SpO2: 95%   Weight: 88.5 kg (195 lb)   Height: 1.676 m (5' 6\")        Body mass index is 31.47 kg/m².    Nutrition Diagnosis:   Inadequate food and beverage intake r/t WLS as evidenced by loss of excess body weight lost 45 lbs over 3 months.    Nutrition Intervention:  Diet: Bariatric Diet, 60-80gm of protein, and 48-64oz of fluid    Nutrition Education: Bariatric Binder (diet guidelines and recipes)    Goal:   Continue bariatric diet and continue to add variety to diet as tolerated.   Sip on water or sugar-free fluid throughout the day. Aiming for a minimum of 64 oz per day.   Eat small, frequent meals containing protein, as tolerated.   Consistently take MVIs and minerals to prevent nutrient deficiencies.   Discuss activity with physician and determine a plan for

## 2024-02-12 NOTE — PROGRESS NOTES
[facility] INVASIVE BARIATRIC SURG  [address]  [address]  [place] [id number]  Dept: [phone]    SURGICAL WEIGHT LOSS MANAGEMENT PROGRAM  PROGRESS NOTE     CC: Weight Loss    Patient: Hailee M Phoenix      Service Date: 2/12/2024  Visit:    3 month post op    Medical Record #: [id number]  Date of Surgery:   11/1/2023    History: 33 y.o. female who presents today for a post op follow up for sleeve gastrectomy.. Patient reports regular bowel movements. She denies nausea, vomiting, fever, and chills. She is having some discomfort over the large LUQ incision. She states she feels like it gets swollen and irritated at times. She states it is also tender if his shirt rubs on it the wrong way.     She has also been struggling with syncope when she goes from sitting to standing. I reviewed her chart and it appears she has struggled with bradycardia in the past. She also had an a tilt table test in 2021 that confirms neurocardiogenic syncope. She states that no one ever told her those results. She has seen a cardiologist and had 30 day holter monitors and was told everything was okay. She does feel like she is drinking minimum 64oz of water per day. She is hitting her protein goals every day. She is complaint with the vitamins.    Labs completed last week, reviewed and stable.    She has a hx of anxiety, depression and bipolar - she feels well in regards to her mental health.    She is not on contraceptives right now. She states she needs to schedule an appt with obgyn to discuss.      Height: 1.676 m (5' 6\")  Highest Weight:   254 lbs      Current Visit Weight Information  Weight: 88.5 kg (195 lb)   BMI: Body mass index is 31.47 kg/m².    Weight loss since surgery:  45 lbs      Exercising?   [x] Yes    [] No     Review of Systems:     Review of Systems   Constitutional:  Negative for appetite change, chills, diaphoresis, fatigue and fever.   Respiratory:

## 2024-02-16 LAB — VIT B1 PYROPHOSHATE BLD-SCNC: 99 NMOL/L (ref 70–180)

## 2024-02-29 ENCOUNTER — HOSPITAL ENCOUNTER (OUTPATIENT)
Age: 34
Discharge: HOME | End: 2024-02-29
Payer: COMMERCIAL

## 2024-02-29 DIAGNOSIS — Z02.0 SCHOOL PHYSICAL EXAM: ICD-10-CM

## 2024-02-29 PROCEDURE — 86787 VARICELLA-ZOSTER ANTIBODY: CPT

## 2024-02-29 PROCEDURE — 36415 COLL VENOUS BLD VENIPUNCTURE: CPT

## 2024-03-01 LAB — VZV IGG SER QL IA: 0.59

## 2024-03-21 PROBLEM — J45.20 MILD INTERMITTENT ASTHMA WITHOUT COMPLICATION: Status: ACTIVE | Noted: 2024-03-21

## 2024-03-21 PROBLEM — F90.9 ADULT ADHD: Status: ACTIVE | Noted: 2024-03-21

## 2024-04-26 ENCOUNTER — OFFICE VISIT (OUTPATIENT)
Dept: CARDIOLOGY | Age: 34
End: 2024-04-26
Payer: COMMERCIAL

## 2024-04-26 VITALS
DIASTOLIC BLOOD PRESSURE: 60 MMHG | HEART RATE: 72 BPM | SYSTOLIC BLOOD PRESSURE: 92 MMHG | HEIGHT: 66 IN | BODY MASS INDEX: 28.93 KG/M2 | WEIGHT: 180 LBS | OXYGEN SATURATION: 99 % | RESPIRATION RATE: 18 BRPM

## 2024-04-26 DIAGNOSIS — R42 LIGHTHEADED: ICD-10-CM

## 2024-04-26 DIAGNOSIS — Z98.84 S/P LAPAROSCOPIC SLEEVE GASTRECTOMY: ICD-10-CM

## 2024-04-26 DIAGNOSIS — R00.1 BRADYCARDIA: ICD-10-CM

## 2024-04-26 DIAGNOSIS — R07.89 CHEST TIGHTNESS: ICD-10-CM

## 2024-04-26 DIAGNOSIS — R55 SYNCOPE, UNSPECIFIED SYNCOPE TYPE: Primary | ICD-10-CM

## 2024-04-26 PROCEDURE — G8427 DOCREV CUR MEDS BY ELIG CLIN: HCPCS | Performed by: INTERNAL MEDICINE

## 2024-04-26 PROCEDURE — 1036F TOBACCO NON-USER: CPT | Performed by: INTERNAL MEDICINE

## 2024-04-26 PROCEDURE — 99214 OFFICE O/P EST MOD 30 MIN: CPT | Performed by: INTERNAL MEDICINE

## 2024-04-26 PROCEDURE — G8417 CALC BMI ABV UP PARAM F/U: HCPCS | Performed by: INTERNAL MEDICINE

## 2024-04-26 RX ORDER — FLUDROCORTISONE ACETATE 0.1 MG/1
0.1 TABLET ORAL DAILY
Qty: 90 TABLET | Refills: 3 | Status: SHIPPED | OUTPATIENT
Start: 2024-04-26 | End: 2025-04-21

## 2024-04-26 RX ORDER — CEFDINIR 300 MG/1
CAPSULE ORAL
COMMUNITY
Start: 2024-04-22

## 2024-04-26 NOTE — PROGRESS NOTES
I, Geneva Edgar am scribing for and in the presence of Ashlyn Vaughn MD, F.A.C.C..    Patient: Hailee M Phoenix  : 1990  Date of Visit: 2024    REASON FOR VISIT / CONSULTATION: Dizziness (HX: Syncope. Pt states she is doing ok since her gastric surgery. Her surgery was in Nov and since then she has had black out episodes. Her episodes occur randomly. She is not sure about what triggers she may have. She also has had night woods in a way. CP, Palpitations, Dizziness, SOB. )      History of Present Illness:        Dear LITO CARVALHO DO,     I had the pleasure of seeing  Hailee M Phoenix in my office today. Ms. Phoenix is a 34 y.o. female with a history of near syncope.    She was seen in the emergency room on 2021 because of abdominal pain and vomiting. Cardiology consulted because of bradycardia and history of syncope.      She does have family history of premature CAD.     Echo done in : Relatively normal 2D echocardiogram with Doppler imaging. Normal cardiac chamber sizes and function. No significant valvular abnormalities. No pericardial effusion. No significant cardiac cause of syncope was identified from this study.     CAM monitor done in : Baseline rhythm is sinus with average heart rate of 71 bpm, ranging between 41 and 141 bpm. Sinus tachycardia represented 7% of the study duration. Rare premature atrial contractions with short runs of ectopic atrial rhythm. No significant ventricular arrhythmia. Three patient activated events recorded correlated with normal sinus rhythm and sinus bradycardia.    Tilt table done in : Abnormal head upright tilt table study.  The patient's heart rate, blood pressure response and symptoms were most consistent with neurocardiogenic dysfunction.    S/p Laparoscopic sleeve gastrectomy done in . Since her surgery she has lost about 80 lbs. She started her journey at 245 lbs.     Ms. Phoenix is here today to reestablish care. She

## 2024-05-10 ENCOUNTER — HOSPITAL ENCOUNTER (OUTPATIENT)
Age: 34
End: 2024-05-10
Attending: INTERNAL MEDICINE
Payer: COMMERCIAL

## 2024-05-10 ENCOUNTER — HOSPITAL ENCOUNTER (OUTPATIENT)
Age: 34
Discharge: HOME | End: 2024-05-10
Attending: INTERNAL MEDICINE
Payer: COMMERCIAL

## 2024-05-10 DIAGNOSIS — R07.89 CHEST TIGHTNESS: ICD-10-CM

## 2024-05-10 DIAGNOSIS — Z98.84 STATUS POST LAPAROSCOPIC SLEEVE GASTRECTOMY: ICD-10-CM

## 2024-05-10 DIAGNOSIS — F41.9 ANXIETY AND DEPRESSION: ICD-10-CM

## 2024-05-10 DIAGNOSIS — E28.2 PCOS (POLYCYSTIC OVARIAN SYNDROME): ICD-10-CM

## 2024-05-10 DIAGNOSIS — K76.0 FATTY LIVER: ICD-10-CM

## 2024-05-10 DIAGNOSIS — Z98.84 S/P LAPAROSCOPIC SLEEVE GASTRECTOMY: ICD-10-CM

## 2024-05-10 DIAGNOSIS — R00.1 BRADYCARDIA: ICD-10-CM

## 2024-05-10 DIAGNOSIS — F31.9 BIPOLAR DISEASE, CHRONIC (HCC): ICD-10-CM

## 2024-05-10 DIAGNOSIS — R42 LIGHTHEADED: ICD-10-CM

## 2024-05-10 DIAGNOSIS — K21.9 GERD WITHOUT ESOPHAGITIS: ICD-10-CM

## 2024-05-10 DIAGNOSIS — R55 SYNCOPE, UNSPECIFIED SYNCOPE TYPE: ICD-10-CM

## 2024-05-10 DIAGNOSIS — R55 NEUROCARDIOGENIC SYNCOPE: ICD-10-CM

## 2024-05-10 DIAGNOSIS — F32.A ANXIETY AND DEPRESSION: ICD-10-CM

## 2024-05-10 LAB
ALBUMIN SERPL-MCNC: 4.5 G/DL (ref 3.5–5.2)
ALBUMIN/GLOB SERPL: 1.9 {RATIO} (ref 1–2.5)
ALP SERPL-CCNC: 38 U/L (ref 35–104)
ALT SERPL-CCNC: 15 U/L (ref 5–33)
ANION GAP SERPL CALCULATED.3IONS-SCNC: 10 MMOL/L (ref 9–17)
AST SERPL-CCNC: 17 U/L
BASOPHILS # BLD: 0.03 K/UL (ref 0–0.2)
BASOPHILS NFR BLD: 1 % (ref 0–2)
BILIRUB SERPL-MCNC: 0.4 MG/DL (ref 0.3–1.2)
BUN SERPL-MCNC: 16 MG/DL (ref 6–20)
BUN/CREAT SERPL: 23 (ref 9–20)
CALCIUM SERPL-MCNC: 8.9 MG/DL (ref 8.6–10.4)
CHLORIDE SERPL-SCNC: 105 MMOL/L (ref 98–107)
CO2 SERPL-SCNC: 26 MMOL/L (ref 20–31)
CREAT SERPL-MCNC: 0.7 MG/DL (ref 0.5–0.9)
EOSINOPHIL # BLD: 0.07 K/UL (ref 0–0.44)
EOSINOPHILS RELATIVE PERCENT: 1 % (ref 1–4)
ERYTHROCYTE [DISTWIDTH] IN BLOOD BY AUTOMATED COUNT: 13.6 % (ref 11.8–14.4)
GFR, ESTIMATED: >90 ML/MIN/1.73M2
GLUCOSE SERPL-MCNC: 70 MG/DL (ref 70–99)
HCT VFR BLD AUTO: 38.9 % (ref 36.3–47.1)
HGB BLD-MCNC: 13.2 G/DL (ref 11.9–15.1)
IMM GRANULOCYTES # BLD AUTO: <0.03 K/UL (ref 0–0.3)
IMM GRANULOCYTES NFR BLD: 0 %
LYMPHOCYTES NFR BLD: 2.69 K/UL (ref 1.1–3.7)
LYMPHOCYTES RELATIVE PERCENT: 44 % (ref 24–43)
MAGNESIUM SERPL-MCNC: 1.9 MG/DL (ref 1.6–2.6)
MCH RBC QN AUTO: 31.5 PG (ref 25.2–33.5)
MCHC RBC AUTO-ENTMCNC: 33.9 G/DL (ref 28.4–34.8)
MCV RBC AUTO: 92.8 FL (ref 82.6–102.9)
MONOCYTES NFR BLD: 0.32 K/UL (ref 0.1–1.2)
MONOCYTES NFR BLD: 5 % (ref 3–12)
NEUTROPHILS NFR BLD: 49 % (ref 36–65)
NEUTS SEG NFR BLD: 2.97 K/UL (ref 1.5–8.1)
NRBC BLD-RTO: 0 PER 100 WBC
PLATELET # BLD AUTO: 262 K/UL (ref 138–453)
PMV BLD AUTO: 9.9 FL (ref 8.1–13.5)
POTASSIUM SERPL-SCNC: 3.7 MMOL/L (ref 3.7–5.3)
PROT SERPL-MCNC: 6.9 G/DL (ref 6.4–8.3)
RBC # BLD AUTO: 4.19 M/UL (ref 3.95–5.11)
SODIUM SERPL-SCNC: 141 MMOL/L (ref 135–144)
TILT CV INITIAL SUPINE HEART RATE: 52 BPM
TILT CV INITIAL SUPINE MAX BP: NORMAL BPM
TILT CV INITIAL SUPINE RHYTHM: NORMAL
TILT CV INITIAL TILT BLOOD PRESSURE: NORMAL MMHG
TILT CV INITIAL TILT HEART RATE: 62 BPM
TILT CV INITIAL TILT RHYTHM: NORMAL
TILT CV MAX BP BLOOD PRESSURE: NORMAL MMHG
TILT CV MAX BP HEART RATE: 56 BPM
TILT CV MAX BP MINUTES: 16
TILT CV MAX BP RHYTHM: NORMAL
TILT CV MAX HEART RATE: 81 BPM
TILT CV MAX HR BLOOD PRESSURE: NORMAL MMHG
TILT CV MAX HR MINUTES: 28
TILT CV MAX HR RHYTHM: NORMAL
TILT CV MINIMUM BP BLOOD PRESSURE: NORMAL MMHG
TILT CV MINIMUM BP HEART RATE: 57 BPM
TILT CV MINIMUM BP MINUTES: 3
TILT CV MINIMUM BP RHYTHM: NORMAL
TILT CV MINIMUM HR BP: NORMAL MMHG
TILT CV MINIMUM HR HEART RATE: 56 BPM
TILT CV MINIMUM HR MINUTES: 16
TILT CV MINIMUM HR RHYTHM: NORMAL
TSH SERPL DL<=0.05 MIU/L-ACNC: 0.55 UIU/ML (ref 0.3–5)
WBC OTHER # BLD: 6.1 K/UL (ref 3.5–11.3)

## 2024-05-10 PROCEDURE — 82728 ASSAY OF FERRITIN: CPT

## 2024-05-10 PROCEDURE — 84443 ASSAY THYROID STIM HORMONE: CPT

## 2024-05-10 PROCEDURE — 80061 LIPID PANEL: CPT

## 2024-05-10 PROCEDURE — 80053 COMPREHEN METABOLIC PANEL: CPT

## 2024-05-10 PROCEDURE — 84630 ASSAY OF ZINC: CPT

## 2024-05-10 PROCEDURE — 82746 ASSAY OF FOLIC ACID SERUM: CPT

## 2024-05-10 PROCEDURE — 83540 ASSAY OF IRON: CPT

## 2024-05-10 PROCEDURE — 83970 ASSAY OF PARATHORMONE: CPT

## 2024-05-10 PROCEDURE — 84425 ASSAY OF VITAMIN B-1: CPT

## 2024-05-10 PROCEDURE — 93243 EXT ECG>48HR<7D SCAN A/R: CPT

## 2024-05-10 PROCEDURE — 83735 ASSAY OF MAGNESIUM: CPT

## 2024-05-10 PROCEDURE — 93660 TILT TABLE EVALUATION: CPT | Performed by: FAMILY MEDICINE

## 2024-05-10 PROCEDURE — 83550 IRON BINDING TEST: CPT

## 2024-05-10 PROCEDURE — 85025 COMPLETE CBC W/AUTO DIFF WBC: CPT

## 2024-05-10 PROCEDURE — 82306 VITAMIN D 25 HYDROXY: CPT

## 2024-05-10 PROCEDURE — 82607 VITAMIN B-12: CPT

## 2024-05-10 PROCEDURE — 36415 COLL VENOUS BLD VENIPUNCTURE: CPT

## 2024-05-10 PROCEDURE — 84590 ASSAY OF VITAMIN A: CPT

## 2024-05-10 PROCEDURE — 93660 TILT TABLE EVALUATION: CPT

## 2024-05-11 LAB
25(OH)D3 SERPL-MCNC: 30.6 NG/ML (ref 30–100)
CHOLEST SERPL-MCNC: 146 MG/DL (ref 0–199)
CHOLESTEROL/HDL RATIO: 3
FERRITIN SERPL-MCNC: 182 NG/ML (ref 13–150)
FOLATE SERPL-MCNC: 14.1 NG/ML (ref 4.8–24.2)
HDLC SERPL-MCNC: 47 MG/DL
IRON SATN MFR SERPL: 28 % (ref 20–55)
IRON SERPL-MCNC: 76 UG/DL (ref 37–145)
LDLC SERPL CALC-MCNC: 76 MG/DL (ref 0–100)
PTH-INTACT SERPL-MCNC: 32 PG/ML (ref 15–65)
TIBC SERPL-MCNC: 276 UG/DL (ref 250–450)
TRIGL SERPL-MCNC: 118 MG/DL
UNSATURATED IRON BINDING CAPACITY: 200 UG/DL (ref 112–347)
VIT B12 SERPL-MCNC: 623 PG/ML (ref 232–1245)
VLDLC SERPL CALC-MCNC: 24 MG/DL

## 2024-05-13 ENCOUNTER — TELEPHONE (OUTPATIENT)
Dept: CARDIOLOGY | Age: 34
End: 2024-05-13

## 2024-05-13 NOTE — TELEPHONE ENCOUNTER
----- Message from Ashlyn Vaughn MD sent at 5/11/2024 11:06 AM EDT -----  Tilt table test is not bad.  Continue current therapy and follow up.  Please call with questions and/or concerns. Thank you

## 2024-05-14 LAB
RETINYL PALMITATE: <0.02 MG/L (ref 0–0.1)
VITAMIN A LEVEL: 0.45 MG/L (ref 0.3–1.2)
VITAMIN A, INTERP: NORMAL

## 2024-05-16 LAB — VIT B1 PYROPHOSHATE BLD-SCNC: 100 NMOL/L (ref 70–180)

## 2024-05-17 ENCOUNTER — HOSPITAL ENCOUNTER (OUTPATIENT)
Age: 34
Discharge: HOME | End: 2024-05-17
Payer: COMMERCIAL

## 2024-05-17 DIAGNOSIS — K21.9 GERD WITHOUT ESOPHAGITIS: ICD-10-CM

## 2024-05-17 DIAGNOSIS — K76.0 FATTY LIVER: ICD-10-CM

## 2024-05-17 DIAGNOSIS — Z98.84 STATUS POST LAPAROSCOPIC SLEEVE GASTRECTOMY: Primary | ICD-10-CM

## 2024-05-17 DIAGNOSIS — Z98.84 STATUS POST LAPAROSCOPIC SLEEVE GASTRECTOMY: ICD-10-CM

## 2024-05-17 DIAGNOSIS — E28.2 PCOS (POLYCYSTIC OVARIAN SYNDROME): ICD-10-CM

## 2024-05-17 PROCEDURE — 36415 COLL VENOUS BLD VENIPUNCTURE: CPT

## 2024-05-17 PROCEDURE — 84630 ASSAY OF ZINC: CPT

## 2024-05-20 LAB — ZINC SERPL-MCNC: 62.1 UG/DL (ref 60–120)

## 2024-05-23 ENCOUNTER — TELEPHONE (OUTPATIENT)
Dept: CARDIOLOGY | Age: 34
End: 2024-05-23

## 2024-05-23 NOTE — TELEPHONE ENCOUNTER
----- Message from Ashlyn Vaughn MD sent at 5/22/2024  9:39 PM EDT -----  Heart monitor is good. Thank you

## 2024-06-18 ENCOUNTER — OFFICE VISIT (OUTPATIENT)
Dept: BARIATRICS/WEIGHT MGMT | Age: 34
End: 2024-06-18
Payer: COMMERCIAL

## 2024-06-18 VITALS
SYSTOLIC BLOOD PRESSURE: 120 MMHG | HEART RATE: 55 BPM | OXYGEN SATURATION: 98 % | HEIGHT: 66 IN | WEIGHT: 168.8 LBS | BODY MASS INDEX: 27.13 KG/M2 | DIASTOLIC BLOOD PRESSURE: 60 MMHG

## 2024-06-18 DIAGNOSIS — E66.3 OVERWEIGHT (BMI 25.0-29.9): ICD-10-CM

## 2024-06-18 DIAGNOSIS — J45.20 MILD INTERMITTENT ASTHMA WITHOUT COMPLICATION: ICD-10-CM

## 2024-06-18 DIAGNOSIS — B37.2 SKIN YEAST INFECTION: ICD-10-CM

## 2024-06-18 DIAGNOSIS — E28.2 PCOS (POLYCYSTIC OVARIAN SYNDROME): Primary | ICD-10-CM

## 2024-06-18 DIAGNOSIS — K76.0 FATTY LIVER: ICD-10-CM

## 2024-06-18 DIAGNOSIS — F32.A ANXIETY AND DEPRESSION: ICD-10-CM

## 2024-06-18 DIAGNOSIS — R00.1 BRADYCARDIA: ICD-10-CM

## 2024-06-18 DIAGNOSIS — Z98.84 S/P LAPAROSCOPIC SLEEVE GASTRECTOMY: ICD-10-CM

## 2024-06-18 DIAGNOSIS — F41.9 ANXIETY AND DEPRESSION: ICD-10-CM

## 2024-06-18 DIAGNOSIS — F31.9 BIPOLAR DISEASE, CHRONIC (HCC): ICD-10-CM

## 2024-06-18 PROBLEM — E66.9 OBESITY (BMI 30-39.9): Status: RESOLVED | Noted: 2023-12-06 | Resolved: 2024-06-18

## 2024-06-18 PROCEDURE — G8417 CALC BMI ABV UP PARAM F/U: HCPCS | Performed by: NURSE PRACTITIONER

## 2024-06-18 PROCEDURE — 99213 OFFICE O/P EST LOW 20 MIN: CPT | Performed by: NURSE PRACTITIONER

## 2024-06-18 PROCEDURE — G8427 DOCREV CUR MEDS BY ELIG CLIN: HCPCS | Performed by: NURSE PRACTITIONER

## 2024-06-18 PROCEDURE — 1036F TOBACCO NON-USER: CPT | Performed by: NURSE PRACTITIONER

## 2024-06-18 RX ORDER — NYSTATIN 100000 [USP'U]/G
POWDER TOPICAL
Qty: 60 G | Refills: 3 | Status: SHIPPED | OUTPATIENT
Start: 2024-06-18

## 2024-06-18 NOTE — PROGRESS NOTES
activity with physician and determine a plan for remaining active.    Monitor/Evaluate:  Diet tolerance, protein intake, vitamin adherence, fluid intake, frequency of meals/snacks, activity, and follow-up at next post-op appointment    Sandi Garvin MS, RD, LD  Clinical Dietitian  [facility]  [phone]  
  Musculoskeletal: Negative for myalgias, joint swelling.   Skin: Negative for pallor. Positive for rash.   Neurological: Negative for tremors, light-headedness and headaches. Positive for dizziness.  Psychiatric/Behavioral: Negative for sleep disturbance and dysphoric mood.     Objective:      Physical Exam   Vital signs reviewed.  General: Well-developed and well-nourished. No acute distress.   Skin: Warm, dry and intact.   HEENT: Normocephalic. EOMs intact. Conjunctivae normal. Neck supple.   Cardiovascular: Normal rate, regular rhythm.    Pulmonary/Chest: Normal effort. Lungs clear to auscultation. No rales, rhonchi or wheezing.   Abdominal: Positive bowel sounds. Soft, nontender. Nondistended. No rigidity, rebound, or guarding.   Musculoskeletal: Movement x4. No edema.  Neurological: Gait normal. Alert and oriented to person, place, and time.   Psychiatric: Normal mood and affect. Speech and behavior normal. Judgment and thought content normal. Cognition and memory intact.     Assessment:       Diagnosis Orders   1. PCOS (polycystic ovarian syndrome)  Comprehensive Metabolic Panel    TSH    T4, Free    Hemoglobin A1C    Vitamin B12 & Folate    Vitamin B1    Vitamin D 25 Hydroxy    Magnesium    Iron and TIBC    Vitamin A    Lipid Panel    PTH, Intact    CBC with Auto Differential    Zinc    Ferritin      2. Anxiety and depression  Comprehensive Metabolic Panel    TSH    T4, Free    Hemoglobin A1C    Vitamin B12 & Folate    Vitamin B1    Vitamin D 25 Hydroxy    Magnesium    Iron and TIBC    Vitamin A    Lipid Panel    PTH, Intact    CBC with Auto Differential    Zinc    Ferritin      3. Fatty liver  Comprehensive Metabolic Panel    TSH    T4, Free    Hemoglobin A1C    Vitamin B12 & Folate    Vitamin B1    Vitamin D 25 Hydroxy    Magnesium    Iron and TIBC    Vitamin A    Lipid Panel    PTH, Intact    CBC with Auto Differential    Zinc    Ferritin      4. S/P laparoscopic sleeve gastrectomy  Comprehensive

## 2024-07-12 ENCOUNTER — HOSPITAL ENCOUNTER (EMERGENCY)
Age: 34
Discharge: HOME | End: 2024-07-12
Attending: STUDENT IN AN ORGANIZED HEALTH CARE EDUCATION/TRAINING PROGRAM
Payer: COMMERCIAL

## 2024-07-12 VITALS
TEMPERATURE: 97.9 F | RESPIRATION RATE: 16 BRPM | OXYGEN SATURATION: 100 % | WEIGHT: 170 LBS | BODY MASS INDEX: 27.32 KG/M2 | SYSTOLIC BLOOD PRESSURE: 112 MMHG | DIASTOLIC BLOOD PRESSURE: 59 MMHG | HEIGHT: 66 IN | HEART RATE: 62 BPM

## 2024-07-12 DIAGNOSIS — I80.01 THROMBOPHLEBITIS OF SUPERFICIAL VEINS OF RIGHT LOWER EXTREMITY: Primary | ICD-10-CM

## 2024-07-12 PROCEDURE — 6360000002 HC RX W HCPCS: Performed by: STUDENT IN AN ORGANIZED HEALTH CARE EDUCATION/TRAINING PROGRAM

## 2024-07-12 PROCEDURE — 99284 EMERGENCY DEPT VISIT MOD MDM: CPT

## 2024-07-12 PROCEDURE — 6370000000 HC RX 637 (ALT 250 FOR IP): Performed by: STUDENT IN AN ORGANIZED HEALTH CARE EDUCATION/TRAINING PROGRAM

## 2024-07-12 PROCEDURE — 96372 THER/PROPH/DIAG INJ SC/IM: CPT

## 2024-07-12 RX ORDER — PREDNISONE 20 MG/1
20 TABLET ORAL ONCE
Status: COMPLETED | OUTPATIENT
Start: 2024-07-12 | End: 2024-07-12

## 2024-07-12 RX ORDER — PREDNISONE 10 MG/1
5 TABLET ORAL ONCE
Status: DISCONTINUED | OUTPATIENT
Start: 2024-07-12 | End: 2024-07-12

## 2024-07-12 RX ORDER — METHYLPHENIDATE HYDROCHLORIDE 10 MG/1
10 TABLET ORAL 2 TIMES DAILY
COMMUNITY

## 2024-07-12 RX ORDER — KETOROLAC TROMETHAMINE 30 MG/ML
30 INJECTION, SOLUTION INTRAMUSCULAR; INTRAVENOUS ONCE
Status: COMPLETED | OUTPATIENT
Start: 2024-07-12 | End: 2024-07-12

## 2024-07-12 RX ADMIN — PREDNISONE 20 MG: 20 TABLET ORAL at 22:42

## 2024-07-12 RX ADMIN — KETOROLAC TROMETHAMINE 30 MG: 30 INJECTION, SOLUTION INTRAMUSCULAR at 22:42

## 2024-07-12 ASSESSMENT — PAIN DESCRIPTION - FREQUENCY: FREQUENCY: INTERMITTENT

## 2024-07-12 ASSESSMENT — PAIN DESCRIPTION - ORIENTATION: ORIENTATION: RIGHT

## 2024-07-12 ASSESSMENT — PAIN - FUNCTIONAL ASSESSMENT: PAIN_FUNCTIONAL_ASSESSMENT: ACTIVITIES ARE NOT PREVENTED

## 2024-07-12 ASSESSMENT — LIFESTYLE VARIABLES
HOW OFTEN DO YOU HAVE A DRINK CONTAINING ALCOHOL: NEVER
HOW MANY STANDARD DRINKS CONTAINING ALCOHOL DO YOU HAVE ON A TYPICAL DAY: PATIENT DOES NOT DRINK

## 2024-07-12 ASSESSMENT — PAIN SCALES - GENERAL: PAINLEVEL_OUTOF10: 5

## 2024-07-12 ASSESSMENT — PAIN DESCRIPTION - PAIN TYPE: TYPE: ACUTE PAIN

## 2024-07-12 ASSESSMENT — PAIN DESCRIPTION - LOCATION: LOCATION: LEG

## 2024-07-12 ASSESSMENT — PAIN DESCRIPTION - DESCRIPTORS: DESCRIPTORS: DULL;SHARP

## 2024-07-13 NOTE — ED PROVIDER NOTES
[facility] ED  Emergency Department Encounter  Emergency Medicine Attending     Pt Name:Hailee M Phoenix  MRN: [id number]  Birthdate 1990  Date of evaluation: 7/12/24  PCP:  Aramis Serna DO  Note Started: 10:28 PM EDT      CHIEF COMPLAINT       Chief Complaint   Patient presents with    Leg Pain     Pt states she has a bruise to the front of her right lower leg that she is concerned for a blood clot.        HISTORY OF PRESENT ILLNESS  (Location/Symptom, Timing/Onset, Context/Setting, Quality, Duration, Modifying Factors, Severity.)      Hailee M Phoenix is a 34 y.o. female who presents with a raised painful varicose vein on the right lower extremity.  Patient is intermittently dysplastic, stated today became raised and rather tender to touch as such she wanted to come in to be evaluated.  Patient has a history of blood clots, states otherwise that she is a healthy states that these have been varicose veins years and denies any new changes    PAST MEDICAL / SURGICAL / SOCIAL / FAMILY HISTORY      has a past medical history of Allergy, Anxiety, ASCUS with positive high risk HPV, Asthma, Bipolar disorder (HCC), COVID, Diabetes mellitus (HCC), Fatty liver, HSIL on Pap smear, Irregular heartbeat, Neurocardiogenic syncope, PCOS (polycystic ovarian syndrome), Postpartum depression, Sleep apnea, Type AB blood, Rh positive, Under care of team, and Under care of team.       has a past surgical history that includes Dilation & curettage (age 16); Dilation and curettage of uterus (10/17/2018); pr conization cervix w/wo d&c rpr eltrd exc (N/A, 10/17/2018); Esophagogastroduodenoscopy (05/26/2023); Upper gastrointestinal endoscopy (N/A, 05/26/2023); gastrectomy (11/01/2023); and Sleeve Gastrectomy (N/A, 11/1/2023).      Social History     Socioeconomic History    Marital status: Single     Spouse name: Not on file    Number of children: Not on file    Years of education: Not on file    Highest education

## 2024-07-13 NOTE — DISCHARGE INSTRUCTIONS
You were evaluated in the emergency department for pain in your lower extremity.  You were found to have what is called a superficial thrombophlebitis.  This is inflammation of a local blood vessel.  It is rather small, there is no concerning features.  If it becomes red, hot, tender or have signs of cellulitis please return to the ER for antibiotics otherwise please follow-up with your primary care doctor for

## 2024-09-20 ENCOUNTER — TELEPHONE (OUTPATIENT)
Dept: BARIATRICS/WEIGHT MGMT | Age: 34
End: 2024-09-20

## 2024-10-09 ENCOUNTER — OFFICE VISIT (OUTPATIENT)
Dept: BARIATRICS/WEIGHT MGMT | Age: 34
End: 2024-10-09
Payer: COMMERCIAL

## 2024-10-09 VITALS
HEART RATE: 52 BPM | DIASTOLIC BLOOD PRESSURE: 68 MMHG | SYSTOLIC BLOOD PRESSURE: 96 MMHG | BODY MASS INDEX: 26.16 KG/M2 | WEIGHT: 157 LBS | OXYGEN SATURATION: 99 % | HEIGHT: 65 IN

## 2024-10-09 DIAGNOSIS — R00.1 BRADYCARDIA: ICD-10-CM

## 2024-10-09 DIAGNOSIS — E66.9 TYPE 2 DIABETES MELLITUS WITH OBESITY (HCC): Primary | ICD-10-CM

## 2024-10-09 DIAGNOSIS — F31.9 BIPOLAR DISEASE, CHRONIC (HCC): ICD-10-CM

## 2024-10-09 DIAGNOSIS — L98.7 EXCESS SKIN OF ABDOMEN: ICD-10-CM

## 2024-10-09 DIAGNOSIS — F41.9 ANXIETY AND DEPRESSION: ICD-10-CM

## 2024-10-09 DIAGNOSIS — J45.20 MILD INTERMITTENT ASTHMA WITHOUT COMPLICATION: ICD-10-CM

## 2024-10-09 DIAGNOSIS — E66.3 OVERWEIGHT (BMI 25.0-29.9): ICD-10-CM

## 2024-10-09 DIAGNOSIS — K76.0 FATTY LIVER: ICD-10-CM

## 2024-10-09 DIAGNOSIS — E28.2 PCOS (POLYCYSTIC OVARIAN SYNDROME): ICD-10-CM

## 2024-10-09 DIAGNOSIS — Z98.84 S/P LAPAROSCOPIC SLEEVE GASTRECTOMY: ICD-10-CM

## 2024-10-09 DIAGNOSIS — F32.A ANXIETY AND DEPRESSION: ICD-10-CM

## 2024-10-09 DIAGNOSIS — E11.69 TYPE 2 DIABETES MELLITUS WITH OBESITY (HCC): Primary | ICD-10-CM

## 2024-10-09 PROCEDURE — 2022F DILAT RTA XM EVC RTNOPTHY: CPT | Performed by: NURSE PRACTITIONER

## 2024-10-09 PROCEDURE — G8417 CALC BMI ABV UP PARAM F/U: HCPCS | Performed by: NURSE PRACTITIONER

## 2024-10-09 PROCEDURE — 1036F TOBACCO NON-USER: CPT | Performed by: NURSE PRACTITIONER

## 2024-10-09 PROCEDURE — G8427 DOCREV CUR MEDS BY ELIG CLIN: HCPCS | Performed by: NURSE PRACTITIONER

## 2024-10-09 PROCEDURE — 3046F HEMOGLOBIN A1C LEVEL >9.0%: CPT | Performed by: NURSE PRACTITIONER

## 2024-10-09 PROCEDURE — 99213 OFFICE O/P EST LOW 20 MIN: CPT | Performed by: NURSE PRACTITIONER

## 2024-10-09 PROCEDURE — G8484 FLU IMMUNIZE NO ADMIN: HCPCS | Performed by: NURSE PRACTITIONER

## 2024-10-09 NOTE — PATIENT INSTRUCTIONS
Vitamin Recommendation  Bariatric Multivitamin Option  Bariatric Multivitamin with 45-60 mg of Iron  Calcium  Can be citrate or carbonate  At least 1000 mg daily  Over the Counter Multivitamin Option. Must take first 4 vitamins listed below.  Multivitamin with Iron: Take 2 daily  B-Complex Vitamin: Take 1 daily  Vitamin D: at least 3000 IU daily  Calcium  Can be citrate or carbonate  At least 1000 mg daily  Iron: May need additional iron if lab levels are lower    Take multivitamin with iron and iron at least 2 hours apart from calcium.

## 2024-10-10 NOTE — PROGRESS NOTES
Post-op Bariatric Surgery Note    Subjective     Patient is nearly 1 year s/p laparoscopic sleeve gastrectomy, down 83 lbs.  Overall, doing well.  Incisions well healed.  Consistent use of MVI and calcium. Tolerating po intake.  Getting adequate fluids and protein.  Bowel function normal.   Physical activity includes walking and lifting at work.  Following with cardiology for bradycardia, hypotension, and syncope. On Florinef.  Having redness and irritation in the skin folds of umbilical area.  Blood sugars stable. No pain or other specific problems or concerns.      Allergies:  No Known Allergies    Past Medical History:     Past Medical History:   Diagnosis Date    Allergy     seasonal    Anxiety     ASCUS with positive high risk HPV 07/16/2013    Asthma childhood    Bipolar disorder (HCC)     COVID     6/2021-loss of taste and smell. 1/2023- flu like symptoms    Diabetes mellitus (HCC)     diet control    Fatty liver 05/15/2023    HSIL on Pap smear 08/16/2012    Irregular heartbeat 10/2021    [facility]    Neurocardiogenic syncope     suspected per [facility]    PCOS (polycystic ovarian syndrome)     Postpartum depression     Sleep apnea     suspected per chart    Type AB blood, Rh positive     Under care of team     PCP- Dr Aramis Serna    Under care of team     Cardiology- Rolando Salinas- no follow up needed   .    Past Surgical History:  Past Surgical History:   Procedure Laterality Date    DILATION AND CURETTAGE  age 16    miscarriage    DILATION AND CURETTAGE OF UTERUS  10/17/2018    Dr North- LEEP    ESOPHAGOGASTRODUODENOSCOPY  05/26/2023    EGD BIOPSY    GASTRECTOMY  11/01/2023    XI ROBOTIC LAPAROSCOPIC GASTRECTOMY SLEEVE, EGD, LIVER BIOPSY - GI SCHEDULED    CT CONIZATION CERVIX W/WO D&C RPR ELTRD EXC N/A 10/17/2018    DILATATION AND CURETTAGE LEEP- ECC performed by Yusef Gómez MD at [facility] OR    SLEEVE GASTRECTOMY N/A 11/1/2023    XI ROBOTIC LAPAROSCOPIC GASTRECTOMY 
meals/snacks, activity, and follow-up at next post-op appointment    David Kline RD, LD  Clinical Bariatric Dietitian  [facility] Weight Management & Surgical Specialist  [phone]

## 2024-11-05 ENCOUNTER — OFFICE VISIT (OUTPATIENT)
Dept: CARDIOLOGY | Age: 34
End: 2024-11-05
Payer: COMMERCIAL

## 2024-11-05 ENCOUNTER — HOSPITAL ENCOUNTER (OUTPATIENT)
Age: 34
Discharge: HOME | End: 2024-11-05
Payer: COMMERCIAL

## 2024-11-05 ENCOUNTER — HOSPITAL ENCOUNTER (OUTPATIENT)
Age: 34
Discharge: HOME | End: 2024-11-07
Payer: COMMERCIAL

## 2024-11-05 VITALS
WEIGHT: 165.2 LBS | DIASTOLIC BLOOD PRESSURE: 65 MMHG | HEIGHT: 65 IN | RESPIRATION RATE: 18 BRPM | BODY MASS INDEX: 27.52 KG/M2 | SYSTOLIC BLOOD PRESSURE: 101 MMHG | HEART RATE: 68 BPM

## 2024-11-05 DIAGNOSIS — Z98.84 S/P LAPAROSCOPIC SLEEVE GASTRECTOMY: ICD-10-CM

## 2024-11-05 DIAGNOSIS — J45.20 MILD INTERMITTENT ASTHMA WITHOUT COMPLICATION: ICD-10-CM

## 2024-11-05 DIAGNOSIS — R00.1 BRADYCARDIA: ICD-10-CM

## 2024-11-05 DIAGNOSIS — R07.89 CHEST TIGHTNESS: ICD-10-CM

## 2024-11-05 DIAGNOSIS — R55 SYNCOPE, UNSPECIFIED SYNCOPE TYPE: Primary | ICD-10-CM

## 2024-11-05 DIAGNOSIS — F41.9 ANXIETY AND DEPRESSION: ICD-10-CM

## 2024-11-05 DIAGNOSIS — E66.9 TYPE 2 DIABETES MELLITUS WITH OBESITY (HCC): ICD-10-CM

## 2024-11-05 DIAGNOSIS — F31.9 BIPOLAR DISEASE, CHRONIC (HCC): ICD-10-CM

## 2024-11-05 DIAGNOSIS — E66.3 OVERWEIGHT (BMI 25.0-29.9): ICD-10-CM

## 2024-11-05 DIAGNOSIS — F32.A ANXIETY AND DEPRESSION: ICD-10-CM

## 2024-11-05 DIAGNOSIS — R42 LIGHTHEADED: ICD-10-CM

## 2024-11-05 DIAGNOSIS — E11.69 TYPE 2 DIABETES MELLITUS WITH OBESITY (HCC): ICD-10-CM

## 2024-11-05 DIAGNOSIS — R55 SYNCOPE, UNSPECIFIED SYNCOPE TYPE: ICD-10-CM

## 2024-11-05 DIAGNOSIS — E28.2 PCOS (POLYCYSTIC OVARIAN SYNDROME): ICD-10-CM

## 2024-11-05 DIAGNOSIS — K76.0 FATTY LIVER: ICD-10-CM

## 2024-11-05 LAB
25(OH)D3 SERPL-MCNC: 27.9 NG/ML (ref 30–100)
ALBUMIN SERPL-MCNC: 4.5 G/DL (ref 3.5–5.2)
ALBUMIN/GLOB SERPL: 1.9 {RATIO} (ref 1–2.5)
ALP SERPL-CCNC: 32 U/L (ref 35–104)
ALT SERPL-CCNC: 18 U/L (ref 10–35)
ANION GAP SERPL CALCULATED.3IONS-SCNC: 9 MMOL/L (ref 9–16)
AST SERPL-CCNC: 19 U/L (ref 10–35)
BASOPHILS # BLD: <0.03 K/UL (ref 0–0.2)
BASOPHILS NFR BLD: 0 % (ref 0–2)
BILIRUB SERPL-MCNC: <0.2 MG/DL (ref 0–1.2)
BUN SERPL-MCNC: 18 MG/DL (ref 6–20)
BUN/CREAT SERPL: 26 (ref 9–20)
CALCIUM SERPL-MCNC: 9.6 MG/DL (ref 8.6–10.4)
CHLORIDE SERPL-SCNC: 103 MMOL/L (ref 98–107)
CHOLEST SERPL-MCNC: 164 MG/DL (ref 0–199)
CHOLESTEROL/HDL RATIO: 3.2
CO2 SERPL-SCNC: 29 MMOL/L (ref 20–31)
CREAT SERPL-MCNC: 0.7 MG/DL (ref 0.5–0.9)
ECHO BSA: 1.85 M2
EOSINOPHIL # BLD: 0.08 K/UL (ref 0–0.44)
EOSINOPHILS RELATIVE PERCENT: 1 % (ref 1–4)
ERYTHROCYTE [DISTWIDTH] IN BLOOD BY AUTOMATED COUNT: 13.1 % (ref 11.8–14.4)
EST. AVERAGE GLUCOSE BLD GHB EST-MCNC: 103 MG/DL
FERRITIN SERPL-MCNC: 138 NG/ML (ref 15–150)
FOLATE SERPL-MCNC: 6.8 NG/ML (ref 4.8–24.2)
GFR, ESTIMATED: >90 ML/MIN/1.73M2
GLUCOSE SERPL-MCNC: 84 MG/DL (ref 74–99)
HBA1C MFR BLD: 5.2 % (ref 4–6)
HCT VFR BLD AUTO: 38.1 % (ref 36.3–47.1)
HDLC SERPL-MCNC: 51 MG/DL
HGB BLD-MCNC: 12.6 G/DL (ref 11.9–15.1)
IMM GRANULOCYTES # BLD AUTO: <0.03 K/UL (ref 0–0.3)
IMM GRANULOCYTES NFR BLD: 0 %
IRON SATN MFR SERPL: 26 % (ref 20–55)
IRON SERPL-MCNC: 73 UG/DL (ref 37–145)
LDLC SERPL CALC-MCNC: 98 MG/DL (ref 0–100)
LYMPHOCYTES NFR BLD: 2.91 K/UL (ref 1.1–3.7)
LYMPHOCYTES RELATIVE PERCENT: 51 % (ref 24–43)
MAGNESIUM SERPL-MCNC: 1.9 MG/DL (ref 1.6–2.6)
MCH RBC QN AUTO: 31.2 PG (ref 25.2–33.5)
MCHC RBC AUTO-ENTMCNC: 33.1 G/DL (ref 28.4–34.8)
MCV RBC AUTO: 94.3 FL (ref 82.6–102.9)
MONOCYTES NFR BLD: 0.44 K/UL (ref 0.1–1.2)
MONOCYTES NFR BLD: 8 % (ref 3–12)
NEUTROPHILS NFR BLD: 40 % (ref 36–65)
NEUTS SEG NFR BLD: 2.28 K/UL (ref 1.5–8.1)
NRBC BLD-RTO: 0 PER 100 WBC
PLATELET # BLD AUTO: 222 K/UL (ref 138–453)
PMV BLD AUTO: 9.5 FL (ref 8.1–13.5)
POTASSIUM SERPL-SCNC: 4 MMOL/L (ref 3.7–5.3)
PROT SERPL-MCNC: 6.8 G/DL (ref 6.6–8.7)
PTH-INTACT SERPL-MCNC: 32 PG/ML (ref 15–65)
RBC # BLD AUTO: 4.04 M/UL (ref 3.95–5.11)
SODIUM SERPL-SCNC: 141 MMOL/L (ref 136–145)
T4 FREE SERPL-MCNC: 0.9 NG/DL (ref 0.9–1.7)
TIBC SERPL-MCNC: 276 UG/DL (ref 250–450)
TRIGL SERPL-MCNC: 74 MG/DL
TSH SERPL DL<=0.05 MIU/L-ACNC: 1.08 UIU/ML (ref 0.27–4.2)
UNSATURATED IRON BINDING CAPACITY: 203 UG/DL (ref 112–347)
VIT B12 SERPL-MCNC: 485 PG/ML (ref 232–1245)
VLDLC SERPL CALC-MCNC: 15 MG/DL (ref 1–30)
WBC OTHER # BLD: 5.7 K/UL (ref 3.5–11.3)

## 2024-11-05 PROCEDURE — 85025 COMPLETE CBC W/AUTO DIFF WBC: CPT

## 2024-11-05 PROCEDURE — 83550 IRON BINDING TEST: CPT

## 2024-11-05 PROCEDURE — 80053 COMPREHEN METABOLIC PANEL: CPT

## 2024-11-05 PROCEDURE — G8427 DOCREV CUR MEDS BY ELIG CLIN: HCPCS | Performed by: PHYSICIAN ASSISTANT

## 2024-11-05 PROCEDURE — G8417 CALC BMI ABV UP PARAM F/U: HCPCS | Performed by: PHYSICIAN ASSISTANT

## 2024-11-05 PROCEDURE — 84590 ASSAY OF VITAMIN A: CPT

## 2024-11-05 PROCEDURE — 82607 VITAMIN B-12: CPT

## 2024-11-05 PROCEDURE — 93000 ELECTROCARDIOGRAM COMPLETE: CPT | Performed by: INTERNAL MEDICINE

## 2024-11-05 PROCEDURE — 84443 ASSAY THYROID STIM HORMONE: CPT

## 2024-11-05 PROCEDURE — 82306 VITAMIN D 25 HYDROXY: CPT

## 2024-11-05 PROCEDURE — 84425 ASSAY OF VITAMIN B-1: CPT

## 2024-11-05 PROCEDURE — 82746 ASSAY OF FOLIC ACID SERUM: CPT

## 2024-11-05 PROCEDURE — 36415 COLL VENOUS BLD VENIPUNCTURE: CPT

## 2024-11-05 PROCEDURE — 84630 ASSAY OF ZINC: CPT

## 2024-11-05 PROCEDURE — 83540 ASSAY OF IRON: CPT

## 2024-11-05 PROCEDURE — 99214 OFFICE O/P EST MOD 30 MIN: CPT | Performed by: PHYSICIAN ASSISTANT

## 2024-11-05 PROCEDURE — 1036F TOBACCO NON-USER: CPT | Performed by: PHYSICIAN ASSISTANT

## 2024-11-05 PROCEDURE — 83735 ASSAY OF MAGNESIUM: CPT

## 2024-11-05 PROCEDURE — 93243 EXT ECG>48HR<7D SCAN A/R: CPT

## 2024-11-05 PROCEDURE — 84439 ASSAY OF FREE THYROXINE: CPT

## 2024-11-05 PROCEDURE — 80061 LIPID PANEL: CPT

## 2024-11-05 PROCEDURE — 83036 HEMOGLOBIN GLYCOSYLATED A1C: CPT

## 2024-11-05 PROCEDURE — 82728 ASSAY OF FERRITIN: CPT

## 2024-11-05 PROCEDURE — G8484 FLU IMMUNIZE NO ADMIN: HCPCS | Performed by: PHYSICIAN ASSISTANT

## 2024-11-05 PROCEDURE — 83970 ASSAY OF PARATHORMONE: CPT

## 2024-11-05 RX ORDER — MIDODRINE HYDROCHLORIDE 2.5 MG/1
2.5 TABLET ORAL 2 TIMES DAILY
Qty: 90 TABLET | Refills: 3 | Status: SHIPPED | OUTPATIENT
Start: 2024-11-05

## 2024-11-05 NOTE — PROGRESS NOTES
Patient: Hailee M Phoenix  : 1990  Date of Visit: 2024    REASON FOR VISIT / CONSULTATION: Follow-up (HX: Syncope. Pt is here for 6 week follow up. She states she is doing ok. She recently had a miscarraige and was under stress a lot. She had a few syncope episodes this week. Denies cp, palps, sob )      History of Present Illness:        Dear LITO CARVALHO, DO,     I had the pleasure of seeing  Hailee M Phoenix in my office today. Ms. Phoenix is a 34 y.o. female with a history of near syncope.    She was seen in the emergency room on 2021 because of abdominal pain and vomiting. Cardiology consulted because of bradycardia and history of syncope.      She does have family history of premature CAD.     Echo done in : Relatively normal 2D echocardiogram with Doppler imaging. Normal cardiac chamber sizes and function. No significant valvular abnormalities. No pericardial effusion. No significant cardiac cause of syncope was identified from this study.     CAM monitor done in : Baseline rhythm is sinus with average heart rate of 71 bpm, ranging between 41 and 141 bpm. Sinus tachycardia represented 7% of the study duration. Rare premature atrial contractions with short runs of ectopic atrial rhythm. No significant ventricular arrhythmia. Three patient activated events recorded correlated with normal sinus rhythm and sinus bradycardia.    Tilt table done in : Abnormal head upright tilt table study.  The patient's heart rate, blood pressure response and symptoms were most consistent with neurocardiogenic dysfunction.    S/p Laparoscopic sleeve gastrectomy done in . Since her surgery she has lost about 80 lbs. She started her journey at 245 lbs.     She was seen in  as above. She reported that since her Laparoscopic sleeve gastrectomy that was done in Nov of last year she has had worsening symptoms again of near syncope and actually two episodes of completely passing

## 2024-11-06 DIAGNOSIS — E55.9 VITAMIN D DEFICIENCY: Primary | ICD-10-CM

## 2024-11-06 RX ORDER — ERGOCALCIFEROL 1.25 MG/1
50000 CAPSULE, LIQUID FILLED ORAL WEEKLY
Qty: 8 CAPSULE | Refills: 0 | Status: SHIPPED | OUTPATIENT
Start: 2024-11-06 | End: 2024-12-26

## 2024-11-07 ENCOUNTER — TELEPHONE (OUTPATIENT)
Dept: CARDIOLOGY | Age: 34
End: 2024-11-07

## 2024-11-07 LAB — ZINC SERPL-MCNC: 62 UG/DL (ref 60–120)

## 2024-11-07 NOTE — TELEPHONE ENCOUNTER
----- Message from Gracia Garza PA-C sent at 11/7/2024 10:25 AM EST -----  Please notify patient that their TSH was normal.   Please continue current treatment and follow up.

## 2024-11-08 LAB
RETINYL PALMITATE: <0.02 MG/L (ref 0–0.1)
VITAMIN A LEVEL: 0.53 MG/L (ref 0.3–1.2)
VITAMIN A, INTERP: NORMAL

## 2024-11-09 LAB — VIT B1 PYROPHOSHATE BLD-SCNC: 81 NMOL/L (ref 70–180)

## 2024-11-14 DIAGNOSIS — Z01.818 PRE-OP TESTING: Primary | ICD-10-CM

## 2024-11-19 PROBLEM — E11.9 TYPE 2 DIABETES MELLITUS WITHOUT RETINOPATHY (HCC): Status: ACTIVE | Noted: 2023-02-22

## 2024-11-27 LAB — ECHO BSA: 1.85 M2

## 2024-12-03 ENCOUNTER — TELEPHONE (OUTPATIENT)
Dept: CARDIOLOGY | Age: 34
End: 2024-12-03

## 2024-12-03 LAB — ECHO BSA: 1.85 M2

## 2024-12-03 NOTE — TELEPHONE ENCOUNTER
----- Message from Gracia Garza PA-C sent at 12/3/2024 12:14 PM EST -----  Please let them know that their CAM monitor was overall unremarkable. We will discuss at their follow up appointment.

## 2024-12-17 ENCOUNTER — HOSPITAL ENCOUNTER (EMERGENCY)
Age: 34
Discharge: HOME | End: 2024-12-17
Attending: EMERGENCY MEDICINE
Payer: COMMERCIAL

## 2024-12-17 ENCOUNTER — OFFICE VISIT (OUTPATIENT)
Dept: CARDIOLOGY | Age: 34
End: 2024-12-17
Payer: COMMERCIAL

## 2024-12-17 ENCOUNTER — APPOINTMENT (OUTPATIENT)
Dept: GENERAL RADIOLOGY | Age: 34
End: 2024-12-17
Payer: COMMERCIAL

## 2024-12-17 VITALS
BODY MASS INDEX: 26.03 KG/M2 | HEART RATE: 61 BPM | SYSTOLIC BLOOD PRESSURE: 115 MMHG | WEIGHT: 162 LBS | HEIGHT: 66 IN | TEMPERATURE: 98.8 F | DIASTOLIC BLOOD PRESSURE: 79 MMHG | RESPIRATION RATE: 18 BRPM | OXYGEN SATURATION: 98 %

## 2024-12-17 VITALS
BODY MASS INDEX: 27.09 KG/M2 | DIASTOLIC BLOOD PRESSURE: 65 MMHG | HEIGHT: 65 IN | WEIGHT: 162.6 LBS | RESPIRATION RATE: 18 BRPM | HEART RATE: 92 BPM | OXYGEN SATURATION: 99 % | SYSTOLIC BLOOD PRESSURE: 106 MMHG

## 2024-12-17 DIAGNOSIS — R42 LIGHTHEADED: ICD-10-CM

## 2024-12-17 DIAGNOSIS — R07.89 CHEST TIGHTNESS: ICD-10-CM

## 2024-12-17 DIAGNOSIS — J06.9 ACUTE UPPER RESPIRATORY INFECTION: ICD-10-CM

## 2024-12-17 DIAGNOSIS — R55 SYNCOPE, UNSPECIFIED SYNCOPE TYPE: Primary | ICD-10-CM

## 2024-12-17 DIAGNOSIS — R00.1 BRADYCARDIA: ICD-10-CM

## 2024-12-17 DIAGNOSIS — N30.00 ACUTE CYSTITIS WITHOUT HEMATURIA: Primary | ICD-10-CM

## 2024-12-17 LAB
BACTERIA URNS QL MICRO: ABNORMAL
BILIRUB UR QL STRIP: NEGATIVE
CLARITY UR: CLEAR
COLOR UR: YELLOW
EPI CELLS #/AREA URNS HPF: ABNORMAL /HPF (ref 0–25)
FLUAV AG SPEC QL: NEGATIVE
FLUBV AG SPEC QL: NEGATIVE
GLUCOSE UR STRIP-MCNC: NEGATIVE MG/DL
HGB UR QL STRIP.AUTO: NEGATIVE
KETONES UR STRIP-MCNC: NEGATIVE MG/DL
LEUKOCYTE ESTERASE UR QL STRIP: NEGATIVE
NITRITE UR QL STRIP: POSITIVE
PH UR STRIP: 6 [PH] (ref 5–9)
PROT UR STRIP-MCNC: NEGATIVE MG/DL
RBC #/AREA URNS HPF: ABNORMAL /HPF (ref 0–2)
SARS-COV-2 RDRP RESP QL NAA+PROBE: NOT DETECTED
SP GR UR STRIP: 1.02 (ref 1.01–1.02)
SPECIMEN DESCRIPTION: NORMAL
UROBILINOGEN UR STRIP-ACNC: NORMAL EU/DL (ref 0–1)
WBC #/AREA URNS HPF: ABNORMAL /HPF (ref 0–5)

## 2024-12-17 PROCEDURE — 71046 X-RAY EXAM CHEST 2 VIEWS: CPT

## 2024-12-17 PROCEDURE — 87635 SARS-COV-2 COVID-19 AMP PRB: CPT

## 2024-12-17 PROCEDURE — 99214 OFFICE O/P EST MOD 30 MIN: CPT | Performed by: PHYSICIAN ASSISTANT

## 2024-12-17 PROCEDURE — 1036F TOBACCO NON-USER: CPT | Performed by: PHYSICIAN ASSISTANT

## 2024-12-17 PROCEDURE — 99284 EMERGENCY DEPT VISIT MOD MDM: CPT

## 2024-12-17 PROCEDURE — 81001 URINALYSIS AUTO W/SCOPE: CPT

## 2024-12-17 PROCEDURE — G8484 FLU IMMUNIZE NO ADMIN: HCPCS | Performed by: PHYSICIAN ASSISTANT

## 2024-12-17 PROCEDURE — 87804 INFLUENZA ASSAY W/OPTIC: CPT

## 2024-12-17 PROCEDURE — G8417 CALC BMI ABV UP PARAM F/U: HCPCS | Performed by: PHYSICIAN ASSISTANT

## 2024-12-17 PROCEDURE — G8427 DOCREV CUR MEDS BY ELIG CLIN: HCPCS | Performed by: PHYSICIAN ASSISTANT

## 2024-12-17 RX ORDER — CEPHALEXIN 500 MG/1
500 CAPSULE ORAL 2 TIMES DAILY
Qty: 14 CAPSULE | Refills: 0 | Status: SHIPPED | OUTPATIENT
Start: 2024-12-17 | End: 2024-12-24

## 2024-12-17 ASSESSMENT — PAIN - FUNCTIONAL ASSESSMENT: PAIN_FUNCTIONAL_ASSESSMENT: 0-10

## 2024-12-17 ASSESSMENT — PAIN SCALES - GENERAL: PAINLEVEL_OUTOF10: 5

## 2024-12-17 ASSESSMENT — PAIN DESCRIPTION - LOCATION: LOCATION: HEAD

## 2024-12-17 NOTE — ED PROVIDER NOTES
normal and S2 normal. No murmur heard.  Pulmonary:      Effort: Pulmonary effort is normal. No accessory muscle usage or respiratory distress.      Breath sounds: Normal breath sounds.   Chest:      Chest wall: No tenderness.   Abdominal:      General: Bowel sounds are normal. There is no distension or abdominal bruit.      Palpations: Abdomen is not rigid.      Tenderness: There is no abdominal tenderness. There is no guarding or rebound.   Musculoskeletal:      Cervical back: Normal range of motion and neck supple.   Skin:     General: Skin is warm.      Findings: No rash.   Neurological:      Mental Status: She is alert and oriented to person, place, and time.      GCS: GCS eye subscore is 4. GCS verbal subscore is 5. GCS motor subscore is 6.   Psychiatric:         Speech: Speech normal.         MEDICAL DECISION MAKING / ED COURSE:   Summary of Patient Presentation:      34-year-old female, cough congestion, the patient is likely suffering from a viral upper respiratory tract infection, chest x-ray COVID and flu are negative, patient will be started on antibiotics for urinary tract infection, outpatient follow-up and return if symptoms worsen or change.    CRITICAL CARE:       PROCEDURES:  Procedures         DATA FOR LAB AND RADIOLOGY TESTS ORDERED BELOW ARE REVIEWED BY THE ED CLINICIAN:    RADIOLOGY: All x-rays, CT, MRI, and formal ultrasound images (except ED bedside ultrasound) are read by the radiologist, see reports below, unless otherwise noted in MDM or here.  Reports below are reviewed by myself.  XR CHEST (2 VW)   Final Result   No acute process.             LABS: Lab orders shown below, the results are reviewed by myself, and all abnormals are listed below.  Labs Reviewed   URINALYSIS WITH REFLEX TO CULTURE - Abnormal; Notable for the following components:       Result Value    Specific Gravity, UA 1.025 (*)     Nitrite, Urine POSITIVE (*)     All other components within normal limits   MICROSCOPIC

## 2024-12-17 NOTE — PROGRESS NOTES
Patient: Hailee M Phoenix  : 1990  Date of Visit: 2024    REASON FOR VISIT / CONSULTATION: Dizziness (HX:syncope PT is here for 6 week follow up she states she is fighting a cold, cardiac doing fine, she stopped Midodrine, still having occasional CP, dizziness depending on work,palp   Denies:sob, )      History of Present Illness:        Dear LITO CARVALHO, DO,     I had the pleasure of seeing  Hailee M Phoenix in my office today. Ms. Phoenix is a 34 y.o. female with a history of near syncope.    She was seen in the emergency room on 2021 because of abdominal pain and vomiting. Cardiology consulted because of bradycardia and history of syncope.      She does have family history of premature CAD.     Echo done in : Relatively normal 2D echocardiogram with Doppler imaging. Normal cardiac chamber sizes and function. No significant valvular abnormalities. No pericardial effusion. No significant cardiac cause of syncope was identified from this study.     CAM monitor done in : Baseline rhythm is sinus with average heart rate of 71 bpm, ranging between 41 and 141 bpm. Sinus tachycardia represented 7% of the study duration. Rare premature atrial contractions with short runs of ectopic atrial rhythm. No significant ventricular arrhythmia. Three patient activated events recorded correlated with normal sinus rhythm and sinus bradycardia.    Tilt table done in : Abnormal head upright tilt table study.  The patient's heart rate, blood pressure response and symptoms were most consistent with neurocardiogenic dysfunction.    S/p Laparoscopic sleeve gastrectomy done in . Since her surgery she has lost about 80 lbs. She started her journey at 245 lbs.     She was seen in  as above. She reported that since her Laparoscopic sleeve gastrectomy that was done in Nov of last year she has had worsening symptoms again of near syncope and actually two episodes of completely passing

## 2025-01-08 ENCOUNTER — OFFICE VISIT (OUTPATIENT)
Dept: BARIATRICS/WEIGHT MGMT | Age: 35
End: 2025-01-08

## 2025-01-08 VITALS
HEART RATE: 63 BPM | HEIGHT: 66 IN | OXYGEN SATURATION: 99 % | SYSTOLIC BLOOD PRESSURE: 102 MMHG | WEIGHT: 166 LBS | BODY MASS INDEX: 26.68 KG/M2 | DIASTOLIC BLOOD PRESSURE: 62 MMHG | RESPIRATION RATE: 18 BRPM

## 2025-01-08 DIAGNOSIS — K21.9 GASTROESOPHAGEAL REFLUX DISEASE WITHOUT ESOPHAGITIS: ICD-10-CM

## 2025-01-08 DIAGNOSIS — Z98.84 STATUS POST LAPAROSCOPIC SLEEVE GASTRECTOMY: ICD-10-CM

## 2025-01-08 DIAGNOSIS — K90.89 OTHER SPECIFIED INTESTINAL MALABSORPTION: Primary | ICD-10-CM

## 2025-01-08 NOTE — PROGRESS NOTES
[facility], [facility] BARIATRIC PART OF [address]  [facility] [address]  Dept: [phone]    SURGICAL WEIGHT LOSS MANAGEMENT PROGRAM  PROGRESS NOTE     CC: Weight Loss    Patient: Hailee M Phoenix      Service Date: 1/8/2025  Visit:   1 year   Medical Record #: [id number]  Date of Surgery:   11/1/2023    History: 34 y.o. female who presents today for a 1 year follow up for sleeve gastrectomy. Patient reports regular bowel movements. She had a miscarriage and has been depressed.  She has been working on increasing oral fluids.  She also has some episodes of GERD.  She can largely control the GERD with diet.  No new abdominal pain.  She is taking vitamins.  She is happy with her weight loss.        Height: 1.676 m (5' 6\")  Highest Weight:   254 lbs      Current Visit Weight Information  Weight: 75.3 kg (166 lb)   BMI: Body mass index is 26.79 kg/m².    Weight loss since surgery:  88 lbs    Liver pathology:    [] NA    [x] No Gross path    [] Liver Steatosis   [x] Discussed w/ pt   [] Referred to GI    Exercising?   [] Yes    [x] No     Review of Systems:     General  Negative for: [] Weight Change   [x] Fatigue   [x] Fevers & Chills    [] Appetite change [] Other:    Positive for: [x] Weight Change   [] Fatigue   [] Fevers & Chills    [] Appetite change [] Other:   Cardiac  Negative for: [x] Chest Pain   [] Difficulty Breathing   [] Leg Cramps [x] Edema of Lower Extremeties    [] Left   [] Right      Positive for: [] Chest Pain   [] Difficulty Breathing   [] Leg Cramps [] Edema of Lower Extremeties    [] Left   [] Right   Pulmonary  Negative for: [x] Shortness of Breath [] Wheeze [x] Cough  [x] Calf Pain     Positive for: [] Shortness of Breath [] Wheeze [] Cough  [] Calf Pain   Gastro-Intestinal Negative for: [] Heartburn   [] Reflux   [] Dysphagia   [] Melena   [] BRBPR  [x] Vomiting   [x] Abdominal Pain   [] Diarrhea

## 2025-01-08 NOTE — PROGRESS NOTES
Medical Nutrition Therapy for Metabolic and Bariatric Surgery  1 Year Post-Op Gastric Sleeve Nutrition Follow-Up      Nutrition Assessment:  Patient reports:  tolerating diet, sometimes eating small, frequent meals.  sometimes eating protein first,  sometimes eating protein portions with all meals and snacks.  Drinking at least 64 oz of fluid and sugar free beverages daily. Patient typically drinking water, green tea, coffee. Patient is working on cutting out sugar sweetened beverages.   consistently taking vitamins and minerals.   not adding physical activity to daily life to remain active. Patient has an active job.     All questions answered. Patient aware of how to contact RD if needs arise. RD to remain available.    24-hr diet recall scanned into chart.    Multivitamin/Mineral Intake: Yes, OTC MVI [employer] brand  Also takes separate  iron supplement, vitamin B12, and hair skin and nails.     Calcium Intake: Yes, citrate    Vitals:   Vitals:    01/08/25 0950   Resp: 18   Weight: 75.3 kg (166 lb)   Height: 1.676 m (5' 6\")      Body mass index is 26.79 kg/m².    Nutrition Diagnosis:   Inadequate food and beverage intake r/t WLS as evidenced by loss of excess body weight lost 74 lbs over 1 Year.    Nutrition Intervention:  Diet: Bariatric Diet, 60-80 gm of protein, and 48-64 oz of fluid    Nutrition Education: Bariatric Binder (diet guidelines and recipes)    Goal:   Continue bariatric diet and continue to add variety to diet as tolerated.   Sip on water or sugar-free fluid throughout the day. Aiming for a minimum of 64 oz per day.   Eat small, frequent meals containing protein, as tolerated.   Consistently take MVIs and minerals to prevent nutrient deficiencies.   Discuss activity with physician and determine a plan for remaining active.    Monitor/Evaluate:  Diet tolerance, protein intake, vitamin adherence, fluid intake, frequency of meals/snacks, activity, and follow-up at next post-op appointment    David

## 2025-03-27 NOTE — PROGRESS NOTES
education and scheduled to return in one week and will be scheduled to see dietitian in the next week. All questions answered and is given office number to call with questions or concerns.     Participant Name: Ezequiel Agosto   Referring Provider:  Rhonda Figueroa MD    Keys to learning:  Considerations: []Language [x]Emotional []Health Literacy  []Cognitive []Memory changes []Financial []Cultural   []Uatsdin []Vision []Hearing  []Speech []Lack of desire  []Literacy  []Psycho-social  []None [x] Covid-19 group restrictions  If considerations are noted, accommodations made:     Identified barriers to learning/self management: works full time, 4 children, Bipolar    The following information was discussed:    [] Diabetes disease process and treatment options   [x] Healthy nutrition, carbohydrate counting, meal planning  [x] Monitoring blood glucose and other parameters; interpreting and using results  [x] Acute complications--prevention, detection and treatment  [x] Medication management and safety   [x] Incorporating physical activity into lifestyle   [] Exercise for Health, Reducing Risks for Heart Disease, Diabetes and Heart Health  [] Preventing, through risk reduction behaviors, detecting, and treating chronic complications  [] Sick Day management  [x] Developing personalized strategies to address psychosocial issues and concerns  [x] Developing personalized strategies to promote health and behavior change through goalsetting, behavior change strategies aimed at risk reduction  [] Special situations--disaster planning, travel, social activities  [] Foot, skin, and dental care    Session Assessment & Evaluation Ratings:  1=Needs Instruction  2=Needs Review  3=Comprehends Key Points  4=Demonstrates Understanding/Competency  NC=Not Covered   N/A=Not Applicable Initial  Assess    Date:  09/23/21 2nd  Visit     Date:   3rd  Visit    Date: 4th  Visit    Date: Comments  S.O.C=Stage of Change/Readiness to questions but benefiting from cues to prompt her retrieval of information.  They generally denied problems across other domains of cognitive functioning.    Pertaining to the patient's psychiatric functioning, she described her recent mood to be \"pleasant.\"  She reported experiencing situational depression approximately 30 years ago while going through a divorce and stated it was treated with medication and psychotherapy.  She denied subsequent clinically significant psychiatric symptoms, diagnosis, or treatment.  The patient denied history of passive or active suicidal ideation, intent, or plan.    With regard to the patient's daily functioning, she stopped driving prior to the stroke because of declines in her eyesight.  She reportedly still manages her finances independently, including paying her bills.  Her daughter, who has been living with her since the stroke, now assist with managing her medications.  Her daughter stated the patient was taking her medications incorrectly, thinking \"twice per day\" meant taking 2 pills at the same time.  Her daughter reported some of this confusion may have contributed to blood pressure issues and the stroke.  The patient reportedly remains independent with household tasks and basic ADLs.    PERTINENT MEDICAL HISTORY:  Patient Active Problem List   Diagnosis    Osteoporosis    Osteopenia    DDD (degenerative disc disease), lumbar    BPPV (benign paroxysmal positional vertigo)    Essential hypertension, benign    Depression    Prediabetes    TIA (transient ischemic attack)    Bilateral carotid artery stenosis    Thrombotic stroke involving left middle cerebral artery (HCC)    CVA (cerebral vascular accident) (HCC)    Sciatica    History of stroke    Vitamin D deficiency    Paresthesia and pain of right extremity    History of CVA (cerebrovascular accident)    Memory loss      Pertaining to the patient's other medical and physical functioning, she goes to sleep around 11 PM and  change:  Pre=Pre-contemplation stage--not thinking about changing  C=Contemplation stage--ambivalent about changing  P=Preparation stage--prepared to made a specific change  A=Action stage--committed to modify behaviors  M=Maintenance and relapse prevention--incorporating new behavior   Participant Stated  Goal      I will add carb and protein to evening snack. I will not go more than 5 hours without eating. Participant Stated Goal  I will increase activity. S.O.C  [] PRE  [x] C  [] P      [] A  [] M                    S.O.C  [] PRE  [x] C  [] P      [] A  [] M     S.O.C  [] PRE  [] C  [] P      [] A  [] M                     S.O.C  [] PRE  [] C  [] P      [] A  [] M      S.O.C  [] PRE  [] C  [] P      [] A  [] M                    S.O.C  [] PRE  [] C  [] P      [] A  [] M     S.O.C  [] PRE  [] C  [] P      [] A  [] M                    S.O.C  [] PRE  [] C  [] P      [] A  [] M   Current main goal attainment frequency:   [x] Never  [] Some  [] Half  [] Most  [] All    Participant confidence to master goal this visit:   [x] Excellent  [] Good  [] [address]  [] Poor            Current main goal attainment frequency:   [x] Never  [] Some  [] Half  [] Most  [] All    Participant confidence to master goal this visit:   [] Excellent  [x] Good [] Fair  [] Poor                  Session  Topics & Learning Objectives:      Comments:   Diabetes disease process & Treatment process: Define diabetes & prediabetes; identify own type of diabetes; role of the pancreas; signs/symptoms; diagnostic criteria; prevention and treatment options; contributing factors. Rating  [x] 1  [] 2  [] 3  [] 4      [] NC  [] N/A   Rating  [] 1  [] 2  [] 3  [] 4  [] NC  [] N/A     Rating  [] 1  [] 2  [] 3  [] 4  [] NC  [] N/A     Rating  [] 1  [] 2  [] 3  [] 4  [] NC  [] N/A           Incorporating nutritional management into lifestyle: Describe effect of type, amount & timing of food on blood glucose;  Describe 3  [] 4  [] NC  [] N/A     Rating  [] 1  [] 2  [] 3  [] 4  [] NC  [] N/A             Time spent with patient: [id number]    Next Appointment: one week     Instruction Method: [x]Lecture/Discussion  []Power Point Presentation  [x]Handouts  []Return Demonstration     Education Materials/Equipment Provided:      [x]Self-Management - Initial assessment - ADA  Where do I Begin? Living with Type 2 diabetes\" booklet;  Diet meal planning basics, handout on diabetes education classes, hyper/hypoglycemia signs/symptoms and treatment handout; Diabetes zones; Support plan resource list.      []Self-Management  Class 1 - Diabetes: Your Take Control Guide\" booklet. Healthy Interactions [facility] \"On The Road To Better Managing Your Diabetes\". [] Self-Management  Class 2 -  \"Traveling with Diabetes\", Dining Out With Diabetes, \"Coping with Diabetes\", \"Diabetes Disaster Planning\", \"Know Your Numbers/Diabetes Care Checklist\", [address] [place] [address] Blood Sugar, Low Blood Sugar. Healthy Interactions Map \"Monitoring Your Blood Glucose. \"     [] Self-Management  Class 3 - \"Risk Factors for CVD\", foot care tips sheet and \"How to pick the right shoe\", \"Medications Used To Treat Diabetes\", How To Care For Your Teeth and Gums\", \"Vaccinations For Adults with Diabetes\", \"Type 2 diabetes and the role of GLP-1\". Healthy Interactions Map \"Continuing Your Journey\". []Self-Management - 3 month follow-up      []Glucose Meter      []Insulin Kit      []Other        Handouts/Booklets given:     [] Diabetes-Your Take Control Guide   [] Daily Diabetic Meal Planning Guide   [] Nutrition in the [facility] [address]    [] Resources for People With Diabetes   [] Other       Diabetes Self Management Support Plan: To assist and support your continued progress in managing your diabetes following education-    (  X)  Gym or exercise program of your choice.            (Suggestions:  [employer], Circuit training, any exercise facility and your local Physical Therapy or Cardiac Rehabilitation exercise Program)      (  )   Library    (  )    ADA website:  BrowserReview.ca. org    (  )   Http: DotProtection.gl    (  )   Diabetes Forecast [place] you may get this information on the ADA web site.     (  )  Diabetes Interview - [place]   [phone]    (  )  Diabetes Self Management (bi-monthly magazine)  [phone]    (  ) Support group: (  ) Support group: Rolando first Tuesday of the month at 9 am and Carlos Rodriguez third Wednesday of the month at 9 am    (  )  Health Journeys Image Paths  (relaxation tapes for people with Diabetes)  [phone]    (  )  Your suggestions:                       Cody Vick RN, BSN, Lena Marques

## 2025-07-09 ENCOUNTER — OFFICE VISIT (OUTPATIENT)
Dept: BARIATRICS/WEIGHT MGMT | Age: 35
End: 2025-07-09
Payer: COMMERCIAL

## 2025-07-09 VITALS
HEART RATE: 45 BPM | BODY MASS INDEX: 25.23 KG/M2 | WEIGHT: 157 LBS | SYSTOLIC BLOOD PRESSURE: 99 MMHG | DIASTOLIC BLOOD PRESSURE: 59 MMHG | HEIGHT: 66 IN | OXYGEN SATURATION: 100 %

## 2025-07-09 DIAGNOSIS — E65 ABDOMINAL PANNUS: ICD-10-CM

## 2025-07-09 DIAGNOSIS — K90.89 OTHER SPECIFIED INTESTINAL MALABSORPTION: Primary | ICD-10-CM

## 2025-07-09 DIAGNOSIS — Z98.84 S/P LAPAROSCOPIC SLEEVE GASTRECTOMY: ICD-10-CM

## 2025-07-09 PROCEDURE — G8417 CALC BMI ABV UP PARAM F/U: HCPCS | Performed by: SURGERY

## 2025-07-09 PROCEDURE — 1036F TOBACCO NON-USER: CPT | Performed by: SURGERY

## 2025-07-09 PROCEDURE — G8427 DOCREV CUR MEDS BY ELIG CLIN: HCPCS | Performed by: SURGERY

## 2025-07-09 PROCEDURE — 99214 OFFICE O/P EST MOD 30 MIN: CPT | Performed by: SURGERY

## 2025-07-09 NOTE — PROGRESS NOTES
[facility], [facility] BARIATRIC PART OF [address]  [facility] [address]  Dept: [phone]    SURGICAL WEIGHT LOSS MANAGEMENT PROGRAM  PROGRESS NOTE     CC: Weight Loss    Patient: Hailee M Phoenix      Service Date: 7/9/2025  Visit:   1 year   Medical Record #: [id number]  Date of Surgery:   11/1/2023    History: 35 y.o. female who presents today for a 1year follow up for sleeve gastrectomy. Patient reports regular bowel movements. She has been working on increasing oral fluids.  GERD is imiproved.  No new abdominal pain.  She is taking vitamins.  She is happy with her weight loss.  Does have rashes under skin folds.       Height: 1.676 m (5' 6\")  Highest Weight:   254 lbs      Current Visit Weight Information  Weight: 71.2 kg (157 lb)   BMI: Body mass index is 25.34 kg/m².    Weight loss since surgery:  97 lbs    Liver pathology:    [] NA    [x] No Gross path    [] Liver Steatosis   [x] Discussed w/ pt   [] Referred to GI    Exercising?   [] Yes    [x] No     Review of Systems:     General  Negative for: [] Weight Change   [x] Fatigue   [x] Fevers & Chills    [] Appetite change [] Other:    Positive for: [x] Weight Change   [] Fatigue   [] Fevers & Chills    [] Appetite change [] Other:   Cardiac  Negative for: [x] Chest Pain   [] Difficulty Breathing   [] Leg Cramps [x] Edema of Lower Extremeties    [] Left   [] Right      Positive for: [] Chest Pain   [] Difficulty Breathing   [] Leg Cramps [] Edema of Lower Extremeties    [] Left   [] Right   Pulmonary  Negative for: [x] Shortness of Breath [] Wheeze [x] Cough  [x] Calf Pain     Positive for: [] Shortness of Breath [] Wheeze [] Cough  [] Calf Pain   Gastro-Intestinal Negative for: [x] Heartburn   [x] Reflux   [] Dysphagia   [] Melena   [] BRBPR  [x] Vomiting   [x] Abdominal Pain   [] Diarrhea     [] Constipation  [] Other:     Positive for: [] Heartburn   [] Reflux   []

## 2025-07-09 NOTE — PROGRESS NOTES
Medical Nutrition Therapy for Metabolic and Bariatric Surgery  18 Months Post-Op Gastric Sleeve Nutrition Follow-Up      Nutrition Assessment:  Patient reports:  tolerating diet, almost always eating small, frequent meals.  sometimes eating protein first,  sometimes eating protein portions with all meals and snacks.  drinking at least 64 oz of fluid and sugar free beverages daily. Patient typically drinking water.  consistently taking vitamins and minerals.   jogging 3x/week to remain active.     Patient with no needs at this time.    All questions answered. Patient aware of how to contact RD if needs arise. RD to remain available.    24-hr diet recall scanned into chart.    Multivitamin/Mineral Intake: Yes, OTC MVI. Does not take additional B complex and Vitamin D, previously educated multiple times.    Calcium Intake: Not additional.    Vitals:   Vitals:    07/09/25 0935   BP: (!) 99/59   BP Site: Right Upper Arm   Patient Position: Sitting   BP Cuff Size: Small Adult   Pulse: (!) 45   SpO2: 100%   Weight: 71.2 kg (157 lb)   Height: 1.676 m (5' 6\")      Body mass index is 25.34 kg/m².    Nutrition Diagnosis:   Inadequate food and beverage intake r/t WLS as evidenced by loss of excess body weight lost 83 lbs over 18 Months.    Nutrition Intervention:  Diet: Bariatric Diet, 60-80 gm of protein, and 48-64 oz of fluid    Nutrition Education: Bariatric Binder (diet guidelines and recipes)    Goal:   Continue bariatric diet and continue to add variety to diet as tolerated.   Sip on water or sugar-free fluid throughout the day. Aiming for a minimum of 64 oz per day.   Eat small, frequent meals containing protein, as tolerated.   Consistently take MVIs and minerals to prevent nutrient deficiencies.   Discuss activity with physician and determine a plan for remaining active.    Monitor/Evaluate:  Diet tolerance, protein intake, vitamin adherence, fluid intake, frequency of meals/snacks, activity, and follow-up at next

## (undated) DEVICE — SOLUTION ANTIFOG VIS SYS CLEARIFY LAPSCP

## (undated) DEVICE — TIP COVER ACCESSORY

## (undated) DEVICE — VESSEL SEALER EXTEND: Brand: ENDOWRIST

## (undated) DEVICE — SOLUTION SURG PREP ANTIMICROBIAL 4 OZ SKIN WND EXIDINE

## (undated) DEVICE — SUTURE SZ 0 27IN 5/8 CIR UR-6  TAPER PT VIOLET ABSRB VICRYL J603H

## (undated) DEVICE — PACK,LITHOTOMY: Brand: MEDLINE

## (undated) DEVICE — SUTURE NONABSORBABLE MONOFILAMENT 3-0 PS-1 18 IN BLK ETHILON 1663H

## (undated) DEVICE — TROCAR: Brand: KII FIOS FIRST ENTRY

## (undated) DEVICE — STAPLER 60 RELOAD GREEN: Brand: SUREFORM

## (undated) DEVICE — STAPLER 60 RELOAD BLUE: Brand: SUREFORM

## (undated) DEVICE — TOWEL,OR,DSP,ST,BLUE,STD,4/PK,20PK/CS: Brand: MEDLINE

## (undated) DEVICE — PAD N ADH W3XL4IN POLY COT SFT PERF FLM EASILY CUT ABSRB

## (undated) DEVICE — GOWN,AURORA,NON-REINFORCED,2XL: Brand: MEDLINE

## (undated) DEVICE — PREP PAD BNS: Brand: CONVERTORS

## (undated) DEVICE — STAPLE ENDOSCP BIOABSORB LN REINF ENDOPATH SEAMGUARD BLK

## (undated) DEVICE — PENCIL ES L3M BTTN SWCH HOLSTER W/ BLDE ELECTRD EDGE

## (undated) DEVICE — Device

## (undated) DEVICE — GLOVE ORANGE PI 7 1/2   MSG9075

## (undated) DEVICE — SUTURE ETHBND EXCEL SZ 0 L30IN NONABSORBABLE GRN L26MM CT-2 X412H

## (undated) DEVICE — ARM DRAPE

## (undated) DEVICE — Z DISCONTINUED BY MEDLINE USE 2711682 TRAY SKIN PREP DRY W/ PREM GLV

## (undated) DEVICE — STAPLE INT BIOABSORBABLE REINF LN SUREFORM 60 GRN SEAMGRD

## (undated) DEVICE — GLOVE ORANGE PI 8   MSG9080

## (undated) DEVICE — 35 ML SYRINGE LUER-LOCK TIP: Brand: MONOJECT

## (undated) DEVICE — COVER LT HNDL BLU PLAS

## (undated) DEVICE — SUTURE VIC + SH-13-0 27IN  VCP311H

## (undated) DEVICE — GARMENT,MEDLINE,DVT,INT,CALF,LG, GEN2: Brand: MEDLINE

## (undated) DEVICE — PAD,ABDOMINAL,8"X10",ST,LF: Brand: MEDLINE

## (undated) DEVICE — SOLUTION IV IRRIG POUR BRL 0.9% SODIUM CHL 2F7124

## (undated) DEVICE — STAPLER 60 RELOAD WHITE: Brand: SUREFORM

## (undated) DEVICE — TOWEL,OR,DSP,ST,NATURAL,DLX,4/PK,20PK/CS: Brand: MEDLINE

## (undated) DEVICE — INSUFFLATION TUBING SET WITH FILTER, FUNNEL CONNECTOR AND LUER LOCK: Brand: JOSNOE MEDICAL INC

## (undated) DEVICE — PROTECTOR ULN NRV PUR FOAM HK LOOP STRP ANATOMICALLY

## (undated) DEVICE — BLADELESS OBTURATOR: Brand: WECK VISTA

## (undated) DEVICE — LIQUIBAND RAPID ADHESIVE 36/CS 0.8ML: Brand: MEDLINE

## (undated) DEVICE — SOLUTION IV 1000ML 0.9% SOD CHL FOR IRRIG PLAS CONT

## (undated) DEVICE — STAPLER 60 RELOAD BLACK: Brand: SUREFORM

## (undated) DEVICE — FORCEPS BX L240CM WRK CHN 2.8MM STD CAP W/ NDL MIC MESH

## (undated) DEVICE — HYPODERMIC SAFETY NEEDLE: Brand: MAGELLAN

## (undated) DEVICE — STRAP,POSITIONING,KNEE/BODY,FOAM,4X60": Brand: MEDLINE

## (undated) DEVICE — GAUZE,SPONGE,4"X4",16PLY,XRAY,STRL,LF: Brand: MEDLINE

## (undated) DEVICE — PEN: MARKING STD 100/CS: Brand: MEDICAL ACTION INDUSTRIES

## (undated) DEVICE — CATHETER URETH 16FR L16IN RED RUB INTMIT ROB MOD BARDX

## (undated) DEVICE — ELECTRODE ARTHSCP W10MM D10MM SHFT 11CM YEL MPLR LOOP W/

## (undated) DEVICE — STAPLER 60: Brand: SUREFORM

## (undated) DEVICE — VISIGI 3D®  CALIBRATION SYSTEM  SIZE 36FR STD W/ BULB: Brand: BOEHRINGER® VISIGI 3D™ SLEEVE GASTRECTOMY CALIBRATION SYSTEM, SIZE 36FR W/BULB

## (undated) DEVICE — ELECTRODE PT RET AD L9FT HI MOIST COND ADH HYDRGEL CORDED

## (undated) DEVICE — SEAL

## (undated) DEVICE — APPLICATOR MEDICATED 26 CC SOLUTION HI LT ORNG CHLORAPREP